# Patient Record
Sex: FEMALE | Race: WHITE | NOT HISPANIC OR LATINO | Employment: OTHER
[De-identification: names, ages, dates, MRNs, and addresses within clinical notes are randomized per-mention and may not be internally consistent; named-entity substitution may affect disease eponyms.]

---

## 2017-02-01 ENCOUNTER — RECORDS - HEALTHEAST (OUTPATIENT)
Dept: ADMINISTRATIVE | Facility: OTHER | Age: 77
End: 2017-02-01

## 2017-02-16 ENCOUNTER — COMMUNICATION - HEALTHEAST (OUTPATIENT)
Dept: FAMILY MEDICINE | Facility: CLINIC | Age: 77
End: 2017-02-16

## 2017-02-21 ENCOUNTER — OFFICE VISIT - HEALTHEAST (OUTPATIENT)
Dept: FAMILY MEDICINE | Facility: CLINIC | Age: 77
End: 2017-02-21

## 2017-02-21 DIAGNOSIS — N64.4 BREAST PAIN, RIGHT: ICD-10-CM

## 2017-02-21 DIAGNOSIS — H05.111 PSEUDOTUMOR (INFLAMMATORY) OF ORBIT, RIGHT: ICD-10-CM

## 2017-02-21 ASSESSMENT — MIFFLIN-ST. JEOR: SCORE: 1140.27

## 2017-03-13 ENCOUNTER — HOSPITAL ENCOUNTER (OUTPATIENT)
Dept: MAMMOGRAPHY | Facility: HOSPITAL | Age: 77
Discharge: HOME | End: 2017-03-13
Attending: FAMILY MEDICINE

## 2017-03-13 DIAGNOSIS — N64.4 BREAST PAIN, RIGHT: ICD-10-CM

## 2017-04-24 ENCOUNTER — COMMUNICATION - HEALTHEAST (OUTPATIENT)
Dept: FAMILY MEDICINE | Facility: CLINIC | Age: 77
End: 2017-04-24

## 2017-04-24 DIAGNOSIS — I10 UNSPECIFIED ESSENTIAL HYPERTENSION: ICD-10-CM

## 2017-04-26 ENCOUNTER — COMMUNICATION - HEALTHEAST (OUTPATIENT)
Dept: FAMILY MEDICINE | Facility: CLINIC | Age: 77
End: 2017-04-26

## 2017-07-27 ENCOUNTER — COMMUNICATION - HEALTHEAST (OUTPATIENT)
Dept: FAMILY MEDICINE | Facility: CLINIC | Age: 77
End: 2017-07-27

## 2017-07-27 DIAGNOSIS — G25.81 RESTLESS LEG SYNDROME: ICD-10-CM

## 2017-08-09 ENCOUNTER — RECORDS - HEALTHEAST (OUTPATIENT)
Dept: GENERAL RADIOLOGY | Facility: CLINIC | Age: 77
End: 2017-08-09

## 2017-08-09 ENCOUNTER — OFFICE VISIT - HEALTHEAST (OUTPATIENT)
Dept: FAMILY MEDICINE | Facility: CLINIC | Age: 77
End: 2017-08-09

## 2017-08-09 DIAGNOSIS — M48.00 SPINAL STENOSIS, UNSPECIFIED REGION OTHER THAN CERVICAL: ICD-10-CM

## 2017-08-09 DIAGNOSIS — I10 ESSENTIAL HYPERTENSION WITH GOAL BLOOD PRESSURE LESS THAN 140/90: ICD-10-CM

## 2017-08-09 DIAGNOSIS — M25.551 PAIN IN RIGHT HIP: ICD-10-CM

## 2017-08-09 DIAGNOSIS — G60.9 HEREDITARY AND IDIOPATHIC PERIPHERAL NEUROPATHY: ICD-10-CM

## 2017-08-09 DIAGNOSIS — M25.551 PELVIC JOINT PAIN, RIGHT: ICD-10-CM

## 2017-08-09 DIAGNOSIS — Z23 NEED FOR TETANUS BOOSTER: ICD-10-CM

## 2017-08-09 DIAGNOSIS — Z00.00 ROUTINE GENERAL MEDICAL EXAMINATION AT A HEALTH CARE FACILITY: ICD-10-CM

## 2017-08-09 DIAGNOSIS — E83.40 DISORDER OF MAGNESIUM METABOLISM: ICD-10-CM

## 2017-08-09 LAB
CHOLEST SERPL-MCNC: 188 MG/DL
FASTING STATUS PATIENT QL REPORTED: YES
HDLC SERPL-MCNC: 46 MG/DL
LDLC SERPL CALC-MCNC: 110 MG/DL
TRIGL SERPL-MCNC: 158 MG/DL

## 2017-08-09 ASSESSMENT — MIFFLIN-ST. JEOR: SCORE: 1151.04

## 2017-09-05 ENCOUNTER — RECORDS - HEALTHEAST (OUTPATIENT)
Dept: ADMINISTRATIVE | Facility: OTHER | Age: 77
End: 2017-09-05

## 2017-10-05 ENCOUNTER — RECORDS - HEALTHEAST (OUTPATIENT)
Dept: LAB | Facility: HOSPITAL | Age: 77
End: 2017-10-05

## 2017-10-05 ENCOUNTER — RECORDS - HEALTHEAST (OUTPATIENT)
Dept: ADMINISTRATIVE | Facility: OTHER | Age: 77
End: 2017-10-05

## 2017-10-09 LAB
ALBUMIN PERCENT: 59.4 % (ref 51–67)
ALBUMIN SERPL ELPH-MCNC: 4.5 G/DL (ref 3.2–4.7)
ALPHA 1 PERCENT: 2.1 % (ref 2–4)
ALPHA 2 PERCENT: 11.8 % (ref 5–13)
ALPHA1 GLOB SERPL ELPH-MCNC: 0.2 G/DL (ref 0.1–0.3)
ALPHA2 GLOB SERPL ELPH-MCNC: 0.9 G/DL (ref 0.4–0.9)
ANA SER QL: 0.5 U
B-GLOBULIN SERPL ELPH-MCNC: 1 G/DL (ref 0.7–1.2)
BETA PERCENT: 13.1 % (ref 10–17)
GAMMA GLOB SERPL ELPH-MCNC: 1 G/DL (ref 0.6–1.4)
GAMMA GLOBULIN PERCENT: 13.6 % (ref 9–20)
PATH ICD:: NORMAL
PROT PATTERN SERPL ELPH-IMP: NORMAL
PROT SERPL-MCNC: 7.6 G/DL (ref 6–8)
REVIEWING PATHOLOGIST: NORMAL

## 2017-10-10 LAB — SS-A/RO AUTOANTIBODIES - HISTORICAL: 1 EU

## 2017-10-17 ENCOUNTER — COMMUNICATION - HEALTHEAST (OUTPATIENT)
Dept: FAMILY MEDICINE | Facility: CLINIC | Age: 77
End: 2017-10-17

## 2017-10-17 LAB — SS-B/LA AUTOANTIBODIES - HISTORICAL: 1 EU

## 2017-11-02 ENCOUNTER — COMMUNICATION - HEALTHEAST (OUTPATIENT)
Dept: SCHEDULING | Facility: CLINIC | Age: 77
End: 2017-11-02

## 2017-11-03 ENCOUNTER — AMBULATORY - HEALTHEAST (OUTPATIENT)
Dept: ADMINISTRATIVE | Facility: CLINIC | Age: 77
End: 2017-11-03

## 2017-11-03 ENCOUNTER — OFFICE VISIT - HEALTHEAST (OUTPATIENT)
Dept: FAMILY MEDICINE | Facility: CLINIC | Age: 77
End: 2017-11-03

## 2017-11-03 DIAGNOSIS — G89.29 RIGHT FLANK PAIN, CHRONIC: ICD-10-CM

## 2017-11-03 DIAGNOSIS — R10.9 RIGHT FLANK PAIN, CHRONIC: ICD-10-CM

## 2017-11-03 ASSESSMENT — MIFFLIN-ST. JEOR: SCORE: 1137.67

## 2018-02-21 ENCOUNTER — COMMUNICATION - HEALTHEAST (OUTPATIENT)
Dept: FAMILY MEDICINE | Facility: CLINIC | Age: 78
End: 2018-02-21

## 2018-02-21 DIAGNOSIS — G60.9 HEREDITARY AND IDIOPATHIC PERIPHERAL NEUROPATHY: ICD-10-CM

## 2018-04-23 ENCOUNTER — COMMUNICATION - HEALTHEAST (OUTPATIENT)
Dept: FAMILY MEDICINE | Facility: CLINIC | Age: 78
End: 2018-04-23

## 2018-04-23 DIAGNOSIS — I10 ESSENTIAL HYPERTENSION: ICD-10-CM

## 2018-04-25 ENCOUNTER — COMMUNICATION - HEALTHEAST (OUTPATIENT)
Dept: FAMILY MEDICINE | Facility: CLINIC | Age: 78
End: 2018-04-25

## 2018-04-25 DIAGNOSIS — G25.81 RESTLESS LEG SYNDROME: ICD-10-CM

## 2018-07-25 ENCOUNTER — OFFICE VISIT - HEALTHEAST (OUTPATIENT)
Dept: FAMILY MEDICINE | Facility: CLINIC | Age: 78
End: 2018-07-25

## 2018-07-25 DIAGNOSIS — M20.41 HAMMERTOE OF SECOND TOE OF RIGHT FOOT: ICD-10-CM

## 2018-07-25 DIAGNOSIS — G60.9 HEREDITARY AND IDIOPATHIC PERIPHERAL NEUROPATHY: ICD-10-CM

## 2018-07-25 DIAGNOSIS — G25.81 RESTLESS LEGS SYNDROME (RLS): ICD-10-CM

## 2018-08-06 ENCOUNTER — RECORDS - HEALTHEAST (OUTPATIENT)
Dept: GENERAL RADIOLOGY | Facility: CLINIC | Age: 78
End: 2018-08-06

## 2018-08-06 ENCOUNTER — OFFICE VISIT - HEALTHEAST (OUTPATIENT)
Dept: FAMILY MEDICINE | Facility: CLINIC | Age: 78
End: 2018-08-06

## 2018-08-06 DIAGNOSIS — G60.9 HEREDITARY AND IDIOPATHIC PERIPHERAL NEUROPATHY: ICD-10-CM

## 2018-08-06 DIAGNOSIS — G25.81 RESTLESS LEGS SYNDROME (RLS): ICD-10-CM

## 2018-08-06 DIAGNOSIS — Z01.818 PREOP EXAMINATION: ICD-10-CM

## 2018-08-06 DIAGNOSIS — Z01.818 ENCOUNTER FOR OTHER PREPROCEDURAL EXAMINATION: ICD-10-CM

## 2018-08-06 DIAGNOSIS — M20.41 HAMMERTOE OF SECOND TOE OF RIGHT FOOT: ICD-10-CM

## 2018-08-06 ASSESSMENT — MIFFLIN-ST. JEOR: SCORE: 1123.61

## 2018-08-07 ENCOUNTER — COMMUNICATION - HEALTHEAST (OUTPATIENT)
Dept: FAMILY MEDICINE | Facility: CLINIC | Age: 78
End: 2018-08-07

## 2018-08-07 ENCOUNTER — ANESTHESIA - HEALTHEAST (OUTPATIENT)
Dept: SURGERY | Facility: AMBULATORY SURGERY CENTER | Age: 78
End: 2018-08-07

## 2018-08-07 ENCOUNTER — AMBULATORY - HEALTHEAST (OUTPATIENT)
Dept: CARDIOLOGY | Facility: CLINIC | Age: 78
End: 2018-08-07

## 2018-08-08 ENCOUNTER — SURGERY - HEALTHEAST (OUTPATIENT)
Dept: SURGERY | Facility: AMBULATORY SURGERY CENTER | Age: 78
End: 2018-08-08

## 2018-08-08 ASSESSMENT — MIFFLIN-ST. JEOR: SCORE: 1119.53

## 2018-08-28 ENCOUNTER — COMMUNICATION - HEALTHEAST (OUTPATIENT)
Dept: FAMILY MEDICINE | Facility: CLINIC | Age: 78
End: 2018-08-28

## 2018-08-28 DIAGNOSIS — G60.9 HEREDITARY AND IDIOPATHIC PERIPHERAL NEUROPATHY: ICD-10-CM

## 2018-09-10 ENCOUNTER — COMMUNICATION - HEALTHEAST (OUTPATIENT)
Dept: FAMILY MEDICINE | Facility: CLINIC | Age: 78
End: 2018-09-10

## 2018-09-10 DIAGNOSIS — I10 ESSENTIAL HYPERTENSION: ICD-10-CM

## 2018-10-14 ENCOUNTER — COMMUNICATION - HEALTHEAST (OUTPATIENT)
Dept: FAMILY MEDICINE | Facility: CLINIC | Age: 78
End: 2018-10-14

## 2018-10-14 DIAGNOSIS — G25.81 RESTLESS LEG SYNDROME: ICD-10-CM

## 2019-02-14 ENCOUNTER — COMMUNICATION - HEALTHEAST (OUTPATIENT)
Dept: FAMILY MEDICINE | Facility: CLINIC | Age: 79
End: 2019-02-14

## 2019-02-14 DIAGNOSIS — I10 ESSENTIAL HYPERTENSION: ICD-10-CM

## 2019-04-02 ENCOUNTER — OFFICE VISIT - HEALTHEAST (OUTPATIENT)
Dept: FAMILY MEDICINE | Facility: CLINIC | Age: 79
End: 2019-04-02

## 2019-04-02 DIAGNOSIS — H91.93 BILATERAL HEARING LOSS, UNSPECIFIED HEARING LOSS TYPE: ICD-10-CM

## 2019-04-02 DIAGNOSIS — H93.13 TINNITUS, BILATERAL: ICD-10-CM

## 2019-04-02 DIAGNOSIS — M26.609 TMJ (TEMPOROMANDIBULAR JOINT SYNDROME): ICD-10-CM

## 2019-04-02 DIAGNOSIS — H61.23 BILATERAL IMPACTED CERUMEN: ICD-10-CM

## 2019-04-03 ENCOUNTER — COMMUNICATION - HEALTHEAST (OUTPATIENT)
Dept: FAMILY MEDICINE | Facility: CLINIC | Age: 79
End: 2019-04-03

## 2019-04-09 ENCOUNTER — COMMUNICATION - HEALTHEAST (OUTPATIENT)
Dept: FAMILY MEDICINE | Facility: CLINIC | Age: 79
End: 2019-04-09

## 2019-04-09 DIAGNOSIS — I10 ESSENTIAL HYPERTENSION: ICD-10-CM

## 2019-06-13 ENCOUNTER — COMMUNICATION - HEALTHEAST (OUTPATIENT)
Dept: FAMILY MEDICINE | Facility: CLINIC | Age: 79
End: 2019-06-13

## 2019-06-13 DIAGNOSIS — G25.81 RESTLESS LEG SYNDROME: ICD-10-CM

## 2019-07-23 ENCOUNTER — COMMUNICATION - HEALTHEAST (OUTPATIENT)
Dept: FAMILY MEDICINE | Facility: CLINIC | Age: 79
End: 2019-07-23

## 2019-07-23 DIAGNOSIS — G60.9 HEREDITARY AND IDIOPATHIC PERIPHERAL NEUROPATHY: ICD-10-CM

## 2019-09-04 ENCOUNTER — OFFICE VISIT - HEALTHEAST (OUTPATIENT)
Dept: FAMILY MEDICINE | Facility: CLINIC | Age: 79
End: 2019-09-04

## 2019-09-04 DIAGNOSIS — G60.9 HEREDITARY AND IDIOPATHIC PERIPHERAL NEUROPATHY: ICD-10-CM

## 2019-09-04 DIAGNOSIS — I10 ESSENTIAL HYPERTENSION: ICD-10-CM

## 2019-09-04 DIAGNOSIS — R42 DIZZINESS AND GIDDINESS: ICD-10-CM

## 2019-09-04 DIAGNOSIS — G25.81 RESTLESS LEGS SYNDROME (RLS): ICD-10-CM

## 2019-09-04 DIAGNOSIS — Z00.00 ENCOUNTER FOR MEDICARE ANNUAL WELLNESS EXAM: ICD-10-CM

## 2019-09-04 DIAGNOSIS — E83.40 DISORDER OF MAGNESIUM METABOLISM: ICD-10-CM

## 2019-09-04 LAB
ALBUMIN SERPL-MCNC: 4.1 G/DL (ref 3.5–5)
ALP SERPL-CCNC: 71 U/L (ref 45–120)
ALT SERPL W P-5'-P-CCNC: 22 U/L (ref 0–45)
ANION GAP SERPL CALCULATED.3IONS-SCNC: 13 MMOL/L (ref 5–18)
AST SERPL W P-5'-P-CCNC: 27 U/L (ref 0–40)
BASOPHILS # BLD AUTO: 0 THOU/UL (ref 0–0.2)
BASOPHILS NFR BLD AUTO: 1 % (ref 0–2)
BILIRUB SERPL-MCNC: 0.4 MG/DL (ref 0–1)
BUN SERPL-MCNC: 15 MG/DL (ref 8–28)
CALCIUM SERPL-MCNC: 10.4 MG/DL (ref 8.5–10.5)
CHLORIDE BLD-SCNC: 100 MMOL/L (ref 98–107)
CHOLEST SERPL-MCNC: 191 MG/DL
CO2 SERPL-SCNC: 27 MMOL/L (ref 22–31)
CREAT SERPL-MCNC: 0.9 MG/DL (ref 0.6–1.1)
EOSINOPHIL # BLD AUTO: 0.2 THOU/UL (ref 0–0.4)
EOSINOPHIL NFR BLD AUTO: 4 % (ref 0–6)
ERYTHROCYTE [DISTWIDTH] IN BLOOD BY AUTOMATED COUNT: 11.3 % (ref 11–14.5)
FASTING STATUS PATIENT QL REPORTED: YES
GFR SERPL CREATININE-BSD FRML MDRD: >60 ML/MIN/1.73M2
GLUCOSE BLD-MCNC: 109 MG/DL (ref 70–125)
HCT VFR BLD AUTO: 43 % (ref 35–47)
HDLC SERPL-MCNC: 54 MG/DL
HGB BLD-MCNC: 14.9 G/DL (ref 12–16)
LDLC SERPL CALC-MCNC: 103 MG/DL
LYMPHOCYTES # BLD AUTO: 1.8 THOU/UL (ref 0.8–4.4)
LYMPHOCYTES NFR BLD AUTO: 31 % (ref 20–40)
MAGNESIUM SERPL-MCNC: 2 MG/DL (ref 1.8–2.6)
MCH RBC QN AUTO: 32.9 PG (ref 27–34)
MCHC RBC AUTO-ENTMCNC: 34.6 G/DL (ref 32–36)
MCV RBC AUTO: 95 FL (ref 80–100)
MONOCYTES # BLD AUTO: 0.6 THOU/UL (ref 0–0.9)
MONOCYTES NFR BLD AUTO: 10 % (ref 2–10)
NEUTROPHILS # BLD AUTO: 3.2 THOU/UL (ref 2–7.7)
NEUTROPHILS NFR BLD AUTO: 54 % (ref 50–70)
PLATELET # BLD AUTO: 294 THOU/UL (ref 140–440)
PMV BLD AUTO: 7.3 FL (ref 7–10)
POTASSIUM BLD-SCNC: 4.1 MMOL/L (ref 3.5–5)
PROT SERPL-MCNC: 7.8 G/DL (ref 6–8)
RBC # BLD AUTO: 4.51 MILL/UL (ref 3.8–5.4)
SODIUM SERPL-SCNC: 140 MMOL/L (ref 136–145)
TRIGL SERPL-MCNC: 169 MG/DL
TSH SERPL DL<=0.005 MIU/L-ACNC: 3.13 UIU/ML (ref 0.3–5)
VIT B12 SERPL-MCNC: 534 PG/ML (ref 213–816)
WBC: 5.8 THOU/UL (ref 4–11)

## 2019-09-04 ASSESSMENT — MIFFLIN-ST. JEOR: SCORE: 1111.81

## 2019-11-01 ENCOUNTER — AMBULATORY - HEALTHEAST (OUTPATIENT)
Dept: NURSING | Facility: CLINIC | Age: 79
End: 2019-11-01

## 2019-11-01 DIAGNOSIS — Z23 FLU VACCINE NEED: ICD-10-CM

## 2020-01-10 ENCOUNTER — COMMUNICATION - HEALTHEAST (OUTPATIENT)
Dept: FAMILY MEDICINE | Facility: CLINIC | Age: 80
End: 2020-01-10

## 2020-01-10 DIAGNOSIS — I10 ESSENTIAL HYPERTENSION: ICD-10-CM

## 2020-04-21 ENCOUNTER — RECORDS - HEALTHEAST (OUTPATIENT)
Dept: ADMINISTRATIVE | Facility: OTHER | Age: 80
End: 2020-04-21

## 2020-05-04 ENCOUNTER — COMMUNICATION - HEALTHEAST (OUTPATIENT)
Dept: SCHEDULING | Facility: CLINIC | Age: 80
End: 2020-05-04

## 2020-05-05 ENCOUNTER — OFFICE VISIT - HEALTHEAST (OUTPATIENT)
Dept: FAMILY MEDICINE | Facility: CLINIC | Age: 80
End: 2020-05-05

## 2020-05-05 DIAGNOSIS — H92.01 OTALGIA, RIGHT: ICD-10-CM

## 2020-05-05 DIAGNOSIS — R42 DIZZINESS: ICD-10-CM

## 2020-05-15 ENCOUNTER — COMMUNICATION - HEALTHEAST (OUTPATIENT)
Dept: FAMILY MEDICINE | Facility: CLINIC | Age: 80
End: 2020-05-15

## 2020-05-15 DIAGNOSIS — G60.9 HEREDITARY AND IDIOPATHIC PERIPHERAL NEUROPATHY: ICD-10-CM

## 2020-06-29 ENCOUNTER — COMMUNICATION - HEALTHEAST (OUTPATIENT)
Dept: FAMILY MEDICINE | Facility: CLINIC | Age: 80
End: 2020-06-29

## 2020-06-29 DIAGNOSIS — G25.81 RESTLESS LEG SYNDROME: ICD-10-CM

## 2020-08-29 ENCOUNTER — COMMUNICATION - HEALTHEAST (OUTPATIENT)
Dept: FAMILY MEDICINE | Facility: CLINIC | Age: 80
End: 2020-08-29

## 2020-09-08 ENCOUNTER — OFFICE VISIT - HEALTHEAST (OUTPATIENT)
Dept: FAMILY MEDICINE | Facility: CLINIC | Age: 80
End: 2020-09-08

## 2020-09-08 DIAGNOSIS — Z23 NEED FOR SHINGLES VACCINE: ICD-10-CM

## 2020-09-08 DIAGNOSIS — E83.40 DISORDER OF MAGNESIUM METABOLISM: ICD-10-CM

## 2020-09-08 DIAGNOSIS — Z78.0 ASYMPTOMATIC MENOPAUSE: ICD-10-CM

## 2020-09-08 DIAGNOSIS — G60.9 HEREDITARY AND IDIOPATHIC PERIPHERAL NEUROPATHY: ICD-10-CM

## 2020-09-08 DIAGNOSIS — I10 ESSENTIAL HYPERTENSION: ICD-10-CM

## 2020-09-08 DIAGNOSIS — M21.611 BUNION, RIGHT: ICD-10-CM

## 2020-09-08 DIAGNOSIS — Z23 NEED FOR IMMUNIZATION AGAINST INFLUENZA: ICD-10-CM

## 2020-09-08 DIAGNOSIS — Z00.00 ENCOUNTER FOR MEDICARE ANNUAL WELLNESS EXAM: ICD-10-CM

## 2020-09-08 DIAGNOSIS — H93.13 TINNITUS, BILATERAL: ICD-10-CM

## 2020-09-08 LAB
ALBUMIN SERPL-MCNC: 3.7 G/DL (ref 3.5–5)
ALP SERPL-CCNC: 68 U/L (ref 45–120)
ALT SERPL W P-5'-P-CCNC: 21 U/L (ref 0–45)
ANION GAP SERPL CALCULATED.3IONS-SCNC: 10 MMOL/L (ref 5–18)
AST SERPL W P-5'-P-CCNC: 24 U/L (ref 0–40)
BASOPHILS # BLD AUTO: 0.1 THOU/UL (ref 0–0.2)
BASOPHILS NFR BLD AUTO: 1 % (ref 0–2)
BILIRUB SERPL-MCNC: 0.3 MG/DL (ref 0–1)
BUN SERPL-MCNC: 9 MG/DL (ref 8–28)
CALCIUM SERPL-MCNC: 9.2 MG/DL (ref 8.5–10.5)
CHLORIDE BLD-SCNC: 101 MMOL/L (ref 98–107)
CHOLEST SERPL-MCNC: 166 MG/DL
CO2 SERPL-SCNC: 29 MMOL/L (ref 22–31)
CREAT SERPL-MCNC: 0.8 MG/DL (ref 0.6–1.1)
EOSINOPHIL # BLD AUTO: 0.2 THOU/UL (ref 0–0.4)
EOSINOPHIL NFR BLD AUTO: 3 % (ref 0–6)
ERYTHROCYTE [DISTWIDTH] IN BLOOD BY AUTOMATED COUNT: 11.3 % (ref 11–14.5)
FASTING STATUS PATIENT QL REPORTED: YES
GFR SERPL CREATININE-BSD FRML MDRD: >60 ML/MIN/1.73M2
GLUCOSE BLD-MCNC: 103 MG/DL (ref 70–125)
HCT VFR BLD AUTO: 42.4 % (ref 35–47)
HDLC SERPL-MCNC: 48 MG/DL
HGB BLD-MCNC: 13.9 G/DL (ref 12–16)
LDLC SERPL CALC-MCNC: 83 MG/DL
LYMPHOCYTES # BLD AUTO: 2.2 THOU/UL (ref 0.8–4.4)
LYMPHOCYTES NFR BLD AUTO: 33 % (ref 20–40)
MAGNESIUM SERPL-MCNC: 2.1 MG/DL (ref 1.8–2.6)
MCH RBC QN AUTO: 31.3 PG (ref 27–34)
MCHC RBC AUTO-ENTMCNC: 32.8 G/DL (ref 32–36)
MCV RBC AUTO: 95 FL (ref 80–100)
MONOCYTES # BLD AUTO: 0.6 THOU/UL (ref 0–0.9)
MONOCYTES NFR BLD AUTO: 8 % (ref 2–10)
NEUTROPHILS # BLD AUTO: 3.7 THOU/UL (ref 2–7.7)
NEUTROPHILS NFR BLD AUTO: 55 % (ref 50–70)
PLATELET # BLD AUTO: 351 THOU/UL (ref 140–440)
PMV BLD AUTO: 6.9 FL (ref 7–10)
POTASSIUM BLD-SCNC: 3.9 MMOL/L (ref 3.5–5)
PROT SERPL-MCNC: 7.5 G/DL (ref 6–8)
RBC # BLD AUTO: 4.45 MILL/UL (ref 3.8–5.4)
SODIUM SERPL-SCNC: 140 MMOL/L (ref 136–145)
TRIGL SERPL-MCNC: 173 MG/DL
WBC: 6.7 THOU/UL (ref 4–11)

## 2020-09-08 ASSESSMENT — MIFFLIN-ST. JEOR: SCORE: 1119.53

## 2020-09-10 ENCOUNTER — AMBULATORY - HEALTHEAST (OUTPATIENT)
Dept: SCHEDULING | Facility: CLINIC | Age: 80
End: 2020-09-10

## 2020-09-10 DIAGNOSIS — Z78.0 ASYMPTOMATIC MENOPAUSE: ICD-10-CM

## 2020-09-16 ENCOUNTER — COMMUNICATION - HEALTHEAST (OUTPATIENT)
Dept: FAMILY MEDICINE | Facility: CLINIC | Age: 80
End: 2020-09-16

## 2020-09-16 DIAGNOSIS — I10 ESSENTIAL HYPERTENSION: ICD-10-CM

## 2020-10-11 ENCOUNTER — RECORDS - HEALTHEAST (OUTPATIENT)
Dept: ADMINISTRATIVE | Facility: OTHER | Age: 80
End: 2020-10-11

## 2020-11-09 ENCOUNTER — OFFICE VISIT - HEALTHEAST (OUTPATIENT)
Dept: FAMILY MEDICINE | Facility: CLINIC | Age: 80
End: 2020-11-09

## 2020-11-09 DIAGNOSIS — I25.83 CORONARY ATHEROSCLEROSIS DUE TO LIPID RICH PLAQUE: ICD-10-CM

## 2020-11-09 DIAGNOSIS — H93.13 TINNITUS, BILATERAL: ICD-10-CM

## 2020-11-09 DIAGNOSIS — G60.9 HEREDITARY AND IDIOPATHIC PERIPHERAL NEUROPATHY: ICD-10-CM

## 2020-11-09 DIAGNOSIS — Z01.818 PREOP EXAMINATION: ICD-10-CM

## 2020-11-09 DIAGNOSIS — M21.611 BUNION, RIGHT: ICD-10-CM

## 2020-11-09 DIAGNOSIS — I10 ESSENTIAL HYPERTENSION: ICD-10-CM

## 2020-11-09 LAB
ATRIAL RATE - MUSE: 90 BPM
DIASTOLIC BLOOD PRESSURE - MUSE: NORMAL
HGB BLD-MCNC: 13.2 G/DL (ref 12–16)
INTERPRETATION ECG - MUSE: NORMAL
P AXIS - MUSE: 78 DEGREES
POTASSIUM BLD-SCNC: 3.9 MMOL/L (ref 3.5–5)
PR INTERVAL - MUSE: 216 MS
QRS DURATION - MUSE: 82 MS
QT - MUSE: 378 MS
QTC - MUSE: 462 MS
R AXIS - MUSE: 2 DEGREES
SYSTOLIC BLOOD PRESSURE - MUSE: NORMAL
T AXIS - MUSE: 71 DEGREES
VENTRICULAR RATE- MUSE: 90 BPM

## 2020-11-09 ASSESSMENT — MIFFLIN-ST. JEOR: SCORE: 1114.99

## 2020-12-15 ENCOUNTER — COMMUNICATION - HEALTHEAST (OUTPATIENT)
Dept: FAMILY MEDICINE | Facility: CLINIC | Age: 80
End: 2020-12-15

## 2020-12-15 DIAGNOSIS — G60.9 HEREDITARY AND IDIOPATHIC PERIPHERAL NEUROPATHY: ICD-10-CM

## 2021-02-01 ENCOUNTER — COMMUNICATION - HEALTHEAST (OUTPATIENT)
Dept: FAMILY MEDICINE | Facility: CLINIC | Age: 81
End: 2021-02-01

## 2021-02-01 ENCOUNTER — RECORDS - HEALTHEAST (OUTPATIENT)
Dept: ADMINISTRATIVE | Facility: OTHER | Age: 81
End: 2021-02-01

## 2021-02-23 ENCOUNTER — OFFICE VISIT - HEALTHEAST (OUTPATIENT)
Dept: FAMILY MEDICINE | Facility: CLINIC | Age: 81
End: 2021-02-23

## 2021-02-23 DIAGNOSIS — M75.41 IMPINGEMENT SYNDROME OF SHOULDER REGION, RIGHT: ICD-10-CM

## 2021-02-26 ENCOUNTER — COMMUNICATION - HEALTHEAST (OUTPATIENT)
Dept: FAMILY MEDICINE | Facility: CLINIC | Age: 81
End: 2021-02-26

## 2021-02-26 DIAGNOSIS — G25.81 RESTLESS LEG SYNDROME: ICD-10-CM

## 2021-02-26 DIAGNOSIS — G60.9 HEREDITARY AND IDIOPATHIC PERIPHERAL NEUROPATHY: ICD-10-CM

## 2021-02-26 DIAGNOSIS — I10 ESSENTIAL HYPERTENSION: ICD-10-CM

## 2021-03-02 ENCOUNTER — AMBULATORY - HEALTHEAST (OUTPATIENT)
Dept: NURSING | Facility: CLINIC | Age: 81
End: 2021-03-02

## 2021-03-23 ENCOUNTER — AMBULATORY - HEALTHEAST (OUTPATIENT)
Dept: NURSING | Facility: CLINIC | Age: 81
End: 2021-03-23

## 2021-03-24 ENCOUNTER — COMMUNICATION - HEALTHEAST (OUTPATIENT)
Dept: FAMILY MEDICINE | Facility: CLINIC | Age: 81
End: 2021-03-24

## 2021-03-24 DIAGNOSIS — G25.81 RESTLESS LEG SYNDROME: ICD-10-CM

## 2021-03-24 DIAGNOSIS — G60.9 HEREDITARY AND IDIOPATHIC PERIPHERAL NEUROPATHY: ICD-10-CM

## 2021-03-24 DIAGNOSIS — I10 ESSENTIAL HYPERTENSION: ICD-10-CM

## 2021-03-24 DIAGNOSIS — M81.0 AGE-RELATED OSTEOPOROSIS WITHOUT CURRENT PATHOLOGICAL FRACTURE: ICD-10-CM

## 2021-03-26 RX ORDER — NORTRIPTYLINE HYDROCHLORIDE 75 MG/1
75 CAPSULE ORAL AT BEDTIME
Qty: 90 CAPSULE | Refills: 2 | Status: SHIPPED | OUTPATIENT
Start: 2021-03-26 | End: 2021-12-25

## 2021-03-26 RX ORDER — HYDROCHLOROTHIAZIDE 25 MG/1
25 TABLET ORAL DAILY
Qty: 90 TABLET | Refills: 2 | Status: SHIPPED | OUTPATIENT
Start: 2021-03-26 | End: 2021-12-25

## 2021-03-26 RX ORDER — GABAPENTIN 300 MG/1
CAPSULE ORAL
Qty: 540 CAPSULE | Refills: 3 | Status: SHIPPED | OUTPATIENT
Start: 2021-03-26 | End: 2022-04-01

## 2021-05-11 ENCOUNTER — OFFICE VISIT - HEALTHEAST (OUTPATIENT)
Dept: FAMILY MEDICINE | Facility: CLINIC | Age: 81
End: 2021-05-11

## 2021-05-11 DIAGNOSIS — Z82.49 FAMILY HISTORY OF ISCHEMIC HEART DISEASE: ICD-10-CM

## 2021-05-25 ENCOUNTER — RECORDS - HEALTHEAST (OUTPATIENT)
Dept: ADMINISTRATIVE | Facility: CLINIC | Age: 81
End: 2021-05-25

## 2021-05-26 ENCOUNTER — RECORDS - HEALTHEAST (OUTPATIENT)
Dept: ADMINISTRATIVE | Facility: CLINIC | Age: 81
End: 2021-05-26

## 2021-05-27 ENCOUNTER — RECORDS - HEALTHEAST (OUTPATIENT)
Dept: ADMINISTRATIVE | Facility: CLINIC | Age: 81
End: 2021-05-27

## 2021-05-27 VITALS
SYSTOLIC BLOOD PRESSURE: 144 MMHG | TEMPERATURE: 98.5 F | HEART RATE: 77 BPM | DIASTOLIC BLOOD PRESSURE: 81 MMHG | WEIGHT: 153 LBS | RESPIRATION RATE: 14 BRPM

## 2021-05-27 NOTE — TELEPHONE ENCOUNTER
RN cannot approve Refill Request    RN can NOT refill this medication overdue for office visits and/or labs.    Alex Garcia, [facility] Triage/Med Refill 4/10/2019    Requested Prescriptions   Pending Prescriptions Disp Refills     hydroCHLOROthiazide (HYDRODIURIL) 25 MG tablet [Pharmacy Med Name: HYDROCHLOROTHIAZIDE 25 MG Tablet] 90 tablet 1     Sig: TAKE 1 TABLET EVERY DAY       Diuretics/Combination Diuretics Refill Protocol  Failed - 4/9/2019  8:18 PM        Failed - Serum Potassium in past 12 months      No results found for: LN-POTASSIUM          Failed - Serum Sodium in past 12 months      No results found for: LN-SODIUM          Failed - Serum Creatinine in past 12 months      Creatinine   Date Value Ref Range Status   08/09/2017 0.86 0.60 - 1.10 mg/dL Final             Passed - Visit with PCP or prescribing provider visit in past 12 months     Last office visit with prescriber/PCP: 4/2/2019 Emmanuelle Woodall MD OR same dept: 4/2/2019 Emmanuelle Woodall MD OR same specialty: 4/2/2019 Emmanuelle Woodall MD  Last physical: 8/6/2018 Last MTM visit: Visit date not found   Next visit within 3 mo: Visit date not found  Next physical within 3 mo: Visit date not found  Prescriber OR PCP: Emmanuelle Woodall MD  Last diagnosis associated with med order: 1. Essential hypertension  - hydroCHLOROthiazide (HYDRODIURIL) 25 MG tablet [Pharmacy Med Name: HYDROCHLOROTHIAZIDE 25 MG Tablet]; TAKE 1 TABLET EVERY DAY  Dispense: 90 tablet; Refill: 1    If protocol passes may refill for 12 months if within 3 months of last provider visit (or a total of 15 months).             Passed - Blood pressure on file in past 12 months     BP Readings from Last 1 Encounters:   04/02/19 130/70

## 2021-05-27 NOTE — PROGRESS NOTES
ASSESSMENT/PLAN:  1. TMJ (temporomandibular joint syndrome)     2. Bilateral impacted cerumen  Ear cerumen removal   3. Bilateral hearing loss, unspecified hearing loss type     4. Tinnitus, bilateral         This is a 78 yo female with:  1.  TMJ syndrome - she has clicking in left TMJ and movement of that joint on exam.  She does chew a lot of gum due to her sicca syndrome/chronic dry mouth.  We discussed this - I would encourage her to talk to her local dentist - or I'd be happy to arrange for a visit to a TMJ specialist.  She prefers to start with her dentist.    2.  Bilateral impacted cerumen - she is being evaluated for hearing aids - has deep impacted cerumen bilaterally - unable to reach with ear curette; will do ear lavage  3.  Bilateral hearing loss - as above - patient notes that she is having more and more difficulty with hearing - has ordered hearing aids - will start with one at a time.   4.  Tinnitus - discussed this is very difficult to treat  Return in about 3 months (around 7/2/2019).      There are no discontinued medications.  There are no Patient Instructions on file for this visit.    Chief Complaint:  Chief Complaint   Patient presents with     Neck and Jaw concerns     snapping noise       HPI:   Deborah Mireles is a 79 y.o. female c/o  Had surgery on right foot - last August -    1.  Jaw is out of place - it snaps  Since then, has snaps in neck   2.  Has ordered hearing aids - will try one at a time  When in a group - with wax and tinnitus, is having trouble hearing  3.  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     Gastroesophageal reflux disease without esophagitis 02/18/2016     Skin tag 12/25/2015     Acute bronchitis 09/14/2015      Chronic cough 01/05/2015     Onychomycosis Of The Toenails      Chronic Constipation      Osteoarthritis Of The Knee      Osteoporosis      Atypical Chest Pain      Seborrheic Dermatitis      Seborrheic Dermatitis Of The Scalp      Itching (Pruritus)      Peripheral Neuropathy      Asymptomatic Coronary Arteriosclerosis      Hypermagnesemia      Restless Legs Syndrome      Hypertension      Spinal Stenosis      Epistaxis      Cerumen Impaction      Vertigo      Cough      Patellofemoral Syndrome      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     Restless Legs Syndrome      Seborrheic Dermatitis      Spinal Stenosis      Vertigo      Past Surgical History:   Procedure Laterality Date     ABDOMINAL SURGERY       APPENDECTOMY       BACK SURGERY       CHOLECYSTECTOMY       COSMETIC SURGERY       EYE SURGERY       FRACTURE SURGERY       LUMBAR FUSION  08/2013     [place] DILATION/CURETTAGE,DIAGNOSTIC      Description: Dilation And Curettage;  Recorded: 02/02/2010;  Comments: due to miscarriage     [place] KNEE SCOPE,DIAGNOSTIC      Description: Arthroscopy Knee Right;  Recorded: 02/02/2010;  Comments: right knee - arthroscopy for debridement; then Synvisc     [place] REMOVAL GALLBLADDER      Description: Cholecystectomy;  Recorded: 02/02/2010;  Comments: age 30s     Social History     Socioeconomic History     Marital status:      Spouse name: Not on file     Number of children: Not on file     Years of education: Not on file     Highest education level: Not on file   Occupational History     Not on file   Social Needs     Financial resource strain: Not on file     Food insecurity:      Worry: Not on file     Inability: Not on file     Transportation needs:     Medical: Not on file     Non-medical: Not on file   Tobacco Use     Smoking status: Never Smoker     Smokeless tobacco: Never Used   Substance and Sexual Activity     Alcohol use: No     Drug use: No     Sexual activity: Not on file   Lifestyle     Physical activity:     Days per week: Not on file     Minutes per session: Not on file     Stress: Not on file   Relationships     Social connections:     Talks on phone: Not on file     Gets together: Not on file     Attends Restorationist service: Not on file     Active member of club or organization: Not on file     Attends meetings of clubs or organizations: Not on file     Relationship status: Not on file     Intimate partner violence:     Fear of current or ex partner: Not on file     Emotionally abused: Not on file     Physically abused: Not on file     Forced sexual activity: Not on file   Other Topics Concern     Not on file   Social History Narrative     Not on file     History reviewed. No pertinent family history.    Meds:    Current Outpatient Medications:      aspirin 81 mg chewable tablet, Chew 81 mg daily., Disp: , Rfl:      calcium carbonate-vitamin D3 (CALCIUM 600 + D,3,) 600 mg(1,500mg) -200 unit per tablet, Take 1 tablet by mouth 2 (two) times a day., Disp: , Rfl:      gabapentin (NEURONTIN) 300 MG capsule, TAKE 2 CAPSULES THREE TIMES DAILY, Disp: 540 capsule, Rfl: 2     glucos-msm-C-Mn-herb#21 (GLUCOSAMINE-MSM COMPLEX) Tab, Take 1 tablet by mouth 2 (two) times a day., Disp: , Rfl:      hydroCHLOROthiazide (HYDRODIURIL) 25 MG tablet, Take 1 tablet (25 mg total) by mouth daily., Disp: 90 tablet, Rfl: 0     HYDROcodone-acetaminophen (NORCO) 5-325 mg per tablet, Take 1-2 tablets by mouth every 4 (four) hours as needed for pain., Disp: 40 tablet, Rfl: 0     MULTIVITAMIN (MULTIPLE VITAMIN ORAL), Take 1 tablet by mouth daily., Disp: , Rfl:      nortriptyline (PAMELOR) 75 MG capsule,  Take 1 capsule (75 mg total) by mouth at bedtime., Disp: 90 capsule, Rfl: 3    Allergies:  No Known Allergies    ROS:  Pertinent positives as noted in HPI; otherwise 12 point ROS negative.      Physical Exam:  EXAM:  /70 (Patient Site: Left Arm, Patient Position: Sitting, Cuff Size: Adult Regular)   Pulse 86   Resp 20   Wt 149 lb 7 oz (67.8 kg)   Breastfeeding? No   BMI 26.47 kg/m     Gen:  NAD, appears well, well-hydrated  HEENT:  TMs bilateral cerumen impaction deep in auditory canals, oropharynx benign, nasal mucosa nl, conjunctiva clear; dry mouth; movement/clicking in left TMJ with opening/closing jaw  Neck:  Supple, no adenopathy, no thyromegaly, no carotid bruits, no JVD  Lungs:  Clear to auscultation bilaterally  Cor:  RRR no murmur  Abd:  Soft, nontender, BS+, no masses, no guarding or rebound, no HSM  Extr:  Neg.  Neuro:  No asymmetry  Skin:  Warm/dry        Results:  Results for orders placed or performed in visit on 10/05/17   Electrophoresis, Protein, Serum   Result Value Ref Range    Albumin % 59.4 51.0 - 67.0 %    Albumin  4.5 3.2 - 4.7 g/dL    Alpha 1 % 2.1 2.0 - 4.0 %    Alpha 1 0.2 0.1 - 0.3 g/dL    Alpha 2 % 11.8 5.0 - 13.0 %    Alpha 2 0.9 0.4 - 0.9 g/dL    % Beta 13.1 10.0 - 17.0 %    Beta 1.0 0.7 - 1.2 g/dL    Gamma Globulin % 13.6 9.0 - 20.0 %    Gamma Globulin 1.0 0.6 - 1.4 g/dL    ELP Comment Unremarkable protein electrophoresis.       Protein, Total 7.6 6.0 - 8.0 g/dL    Path ICD: G62.9     Interpreted By: Dejon Garay MD    Antinuclear Antibodies Screen (ALEJANDRO)   Result Value Ref Range    ALEJANDRO Screen Cascade 0.5 <=2.9 U   Rheumatoid Factor Screen   Result Value Ref Range    Rheumatoid Factor Quantitative <15.0 0 - 30 IU/mL   SS-A/RO Auto Antibodies   Result Value Ref Range    SS-A/Ro Autoantibodies 1 <20 EU   SS-B/LA Auto Antibodies   Result Value Ref Range    SS-B/La Autoantibodies 1 <20 EU

## 2021-05-28 ENCOUNTER — RECORDS - HEALTHEAST (OUTPATIENT)
Dept: ADMINISTRATIVE | Facility: CLINIC | Age: 81
End: 2021-05-28

## 2021-05-29 ENCOUNTER — RECORDS - HEALTHEAST (OUTPATIENT)
Dept: ADMINISTRATIVE | Facility: CLINIC | Age: 81
End: 2021-05-29

## 2021-05-29 NOTE — TELEPHONE ENCOUNTER
Refill Approved    Rx renewed per Medication Renewal Policy. Medication was last renewed on 10/15/2018 for 540/3  Last OV 4/2/2019    Kajal Riddle, [facility] Triage/Med Refill 6/14/2019     Requested Prescriptions   Pending Prescriptions Disp Refills     gabapentin (NEURONTIN) 300 MG capsule [Pharmacy Med Name: GABAPENTIN 300 MG Capsule] 540 capsule 2     Sig: TAKE 2 CAPSULES THREE TIMES DAILY       Gabapentin/Levetiracetam/Tiagabine Refill Protocol  Passed - 6/13/2019 11:35 AM        Passed - PCP or prescribing provider visit in past 12 months or next 3 months     Last office visit with prescriber/PCP: 4/2/2019 Emmanuelle Woodall MD OR same dept: 4/2/2019 Emmanuelle Woodall MD OR same specialty: 4/2/2019 Emmanuelle Woodall MD  Last physical: 8/6/2018 Last MTM visit: Visit date not found   Next visit within 3 mo: Visit date not found  Next physical within 3 mo: Visit date not found  Prescriber OR PCP: Emmanuelle Woodall MD  Last diagnosis associated with med order: 1. Restless leg syndrome  - gabapentin (NEURONTIN) 300 MG capsule [Pharmacy Med Name: GABAPENTIN 300 MG Capsule]; TAKE 2 CAPSULES THREE TIMES DAILY  Dispense: 540 capsule; Refill: 2    If protocol passes may refill for 12 months if within 3 months of last provider visit (or a total of 15 months).

## 2021-05-30 ENCOUNTER — RECORDS - HEALTHEAST (OUTPATIENT)
Dept: ADMINISTRATIVE | Facility: CLINIC | Age: 81
End: 2021-05-30

## 2021-05-30 VITALS — WEIGHT: 153.7 LBS | HEIGHT: 63 IN | BODY MASS INDEX: 27.23 KG/M2

## 2021-05-30 NOTE — TELEPHONE ENCOUNTER
Refill Approved    Rx renewed per Medication Renewal Policy. Medication was last renewed on 8/28/18.    Enedina Jackson, [facility] Triage/Med Refill 7/24/2019     Requested Prescriptions   Pending Prescriptions Disp Refills     nortriptyline (PAMELOR) 75 MG capsule [Pharmacy Med Name: NORTRIPTYLINE HCL 75 MG Capsule] 90 capsule 3     Sig: TAKE 1 CAPSULE AT BEDTIME       Tricyclics/Misc Antidepressant/Antianxiety Meds Refill Protocol Passed - 7/23/2019  3:14 PM        Passed - PCP or prescribing provider visit in last year     Last office visit with prescriber/PCP: 4/2/2019 Emmanuelle Woodall MD OR same dept: 4/2/2019 Emmanuelle Woodall MD OR same specialty: 4/2/2019 Emmanuelle Woodall MD  Last physical: 8/6/2018 Last MTM visit: Visit date not found   Next visit within 3 mo: Visit date not found  Next physical within 3 mo: Visit date not found  Prescriber OR PCP: Emmanuelle Woodall MD  Last diagnosis associated with med order: 1. Hereditary and idiopathic peripheral neuropathy  - nortriptyline (PAMELOR) 75 MG capsule [Pharmacy Med Name: NORTRIPTYLINE HCL 75 MG Capsule]; TAKE 1 CAPSULE AT BEDTIME  Dispense: 90 capsule; Refill: 3    If protocol passes may refill for 12 months if within 3 months of last provider visit (or a total of 15 months).

## 2021-05-31 VITALS — WEIGHT: 154 LBS | HEIGHT: 63 IN | BODY MASS INDEX: 27.29 KG/M2

## 2021-05-31 VITALS — WEIGHT: 155.2 LBS | HEIGHT: 64 IN | BODY MASS INDEX: 26.5 KG/M2

## 2021-06-01 ENCOUNTER — RECORDS - HEALTHEAST (OUTPATIENT)
Dept: ADMINISTRATIVE | Facility: CLINIC | Age: 81
End: 2021-06-01

## 2021-06-01 VITALS — HEIGHT: 63 IN | WEIGHT: 150 LBS | BODY MASS INDEX: 26.58 KG/M2

## 2021-06-01 VITALS — BODY MASS INDEX: 26.75 KG/M2 | WEIGHT: 151 LBS

## 2021-06-01 VITALS — WEIGHT: 150.9 LBS | BODY MASS INDEX: 26.74 KG/M2 | HEIGHT: 63 IN

## 2021-06-01 NOTE — PROGRESS NOTES
Assessment and Plan:     1. Encounter for Medicare annual wellness exam     2. Peripheral Neuropathy  Comprehensive Metabolic Panel    HM1(CBC and Differential)    Thyroid Stimulating Hormone (TSH)    Vitamin B12    HM1 (CBC with Diff)   3. Hypermagnesemia  Magnesium   4. Hypertension  Comprehensive Metabolic Panel    Lipid Profile   5. Vertigo     6. Restless Legs Syndrome       This is a 78 yo female here for AWV/exam  1.  Peripheral neuropathy - stable chronic problem - but, still unclear etiology; check labs  2.  Hypermagnesemia - has history of low magnesium - check levels  3.  Hypertension - blood pressure is stable - continue to monitor labs  4.  Vertigo - generally stable, but recurrent symptoms   5.  Restless Legs Syndrome - chronic - still bothersome and interferes with sleep       The patient's current medical problems were reviewed.    I have had an Advance Directives discussion with the patient.  The following health maintenance schedule was reviewed with the patient and provided in printed form in the after visit summary:   Health Maintenance   Topic Date Due     DXA SCAN  01/16/2006     ZOSTER VACCINES (2 of 3) 06/27/2012     INFLUENZA VACCINE RULE BASED (1) 08/01/2019     MEDICARE ANNUAL WELLNESS VISIT  09/04/2020     FALL RISK ASSESSMENT  09/04/2020     ADVANCE CARE PLANNING  09/04/2024     TD 18+ HE  08/09/2027     PNEUMOCOCCAL POLYSACCHARIDE VACCINE AGE 65 AND OVER  Completed     PNEUMOCOCCAL CONJUGATE VACCINE FOR ADULTS (PCV13 OR PREVNAR)  Completed        Subjective:   Chief Complaint: Deborah Mireles is an 79 y.o. female here for an Annual Wellness visit.   HPI:      Still struggling with sleep issues/ chronic pain issues - no changes in regimens      Review of Systems:    Please see above.  The rest of the review of systems are negative for all systems.    Patient Care Team:  Emmanuelle Woodall MD as PCP - General  Emmanuelle Woodall MD as Assigned PCP     Patient  Active Problem List   Diagnosis     Chronic Constipation     Osteoarthritis Of The Knee     Osteoporosis     Atypical Chest Pain     Seborrheic Dermatitis     Seborrheic Dermatitis Of The Scalp     Itching (Pruritus)     Peripheral Neuropathy     Asymptomatic Coronary Arteriosclerosis     Hypermagnesemia     Restless Legs Syndrome     Hypertension     Spinal Stenosis     Epistaxis     Cerumen Impaction     Vertigo     Cough     Patellofemoral Syndrome     Onychomycosis Of The Toenails     Chronic cough     Acute bronchitis     Skin tag     Gastroesophageal reflux disease without esophagitis     Pseudotumor (inflammatory) of orbit, right     Right flank pain, chronic     Hammertoe of second toe of right foot     Bilateral hearing loss     Tinnitus, bilateral     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     Restless Legs Syndrome      Seborrheic Dermatitis      Spinal Stenosis      Vertigo       Past Surgical History:   Procedure Laterality Date     ABDOMINAL SURGERY       APPENDECTOMY       BACK SURGERY       CHOLECYSTECTOMY       COSMETIC SURGERY       EYE SURGERY       FRACTURE SURGERY       LUMBAR FUSION  08/2013     [place] DILATION/CURETTAGE,DIAGNOSTIC      Description: Dilation And Curettage;  Recorded: 02/02/2010;  Comments: due to miscarriage     [place] KNEE SCOPE,DIAGNOSTIC      Description: Arthroscopy Knee Right;  Recorded: 02/02/2010;  Comments: right knee - arthroscopy for debridement; then Synvisc     [place] REMOVAL GALLBLADDER      Description: Cholecystectomy;  Recorded: 02/02/2010;  Comments: age 30s      History reviewed. No pertinent family history.   Social  History     Socioeconomic History     Marital status:      Spouse name: Not on file     Number of children: Not on file     Years of education: Not on file     Highest education level: Not on file   Occupational History     Not on file   Social Needs     Financial resource strain: Not on file     Food insecurity:     Worry: Not on file     Inability: Not on file     Transportation needs:     Medical: Not on file     Non-medical: Not on file   Tobacco Use     Smoking status: Never Smoker     Smokeless tobacco: Never Used   Substance and Sexual Activity     Alcohol use: No     Drug use: No     Sexual activity: Not on file   Lifestyle     Physical activity:     Days per week: Not on file     Minutes per session: Not on file     Stress: Not on file   Relationships     Social connections:     Talks on phone: Not on file     Gets together: Not on file     Attends Latter day service: Not on file     Active member of club or organization: Not on file     Attends meetings of clubs or organizations: Not on file     Relationship status: Not on file     Intimate partner violence:     Fear of current or ex partner: Not on file     Emotionally abused: Not on file     Physically abused: Not on file     Forced sexual activity: Not on file   Other Topics Concern     Not on file   Social History Narrative     Not on file      Current Outpatient Medications   Medication Sig Dispense Refill     aspirin 81 mg chewable tablet Chew 81 mg daily.       calcium carbonate-vitamin D3 (CALCIUM 600 + D,3,) 600 mg(1,500mg) -200 unit per tablet Take 1 tablet by mouth 2 (two) times a day.       gabapentin (NEURONTIN) 300 MG capsule TAKE 2 CAPSULES THREE TIMES DAILY 540 capsule 3     glucos-msm-C-Mn-herb#21 (GLUCOSAMINE-MSM COMPLEX) Tab Take 1 tablet by mouth 2 (two) times a day.       hydroCHLOROthiazide (HYDRODIURIL) 25 MG tablet TAKE 1 TABLET EVERY DAY 90 tablet 1     MULTIVITAMIN (MULTIPLE VITAMIN ORAL) Take 1 tablet by mouth daily.        "nortriptyline (PAMELOR) 75 MG capsule TAKE 1 CAPSULE AT BEDTIME 90 capsule 2     No current facility-administered medications for this visit.       Objective:   Vital Signs:   Visit Vitals  /68 (Patient Site: Right Arm, Patient Position: Sitting, Cuff Size: Adult Regular)   Pulse 93   Temp 97.9  F (36.6  C) (Oral)   Resp 16   Ht 5' 3\" (1.6 m)   Wt 148 lb 4.8 oz (67.3 kg)   SpO2 97%   Breastfeeding? No   BMI 26.27 kg/m         VisionScreening:  No exam data present     PHYSICAL EXAM  EXAM:  /68 (Patient Site: Right Arm, Patient Position: Sitting, Cuff Size: Adult Regular)   Pulse 93   Temp 97.9  F (36.6  C) (Oral)   Resp 16   Ht 5' 3\" (1.6 m)   Wt 148 lb 4.8 oz (67.3 kg)   SpO2 97%   Breastfeeding? No   BMI 26.27 kg/m     Gen:  NAD, appears well, well-hydrated  HEENT:  TMs nl, oropharynx benign, nasal mucosa nl, conjunctiva clear  Neck:  Supple, no adenopathy, no thyromegaly, no carotid bruits, no JVD  Lungs:  Clear to auscultation bilaterally  Breast exam:  No breast lumps, no skin changes, no nipple discharge, no axillary adenopathy  Cor:  RRR no murmur  Abd:  Soft, nontender, BS+, no masses, no guarding or rebound, no HSM  Extr:  Neg.  Neuro:  No asymmetry, Nl motor tone/strength, decreased sensation at periphery, reflexes =, gait nl, nl coordination, CN intact,   Skin:  Warm/dry        Assessment Results 9/4/2019   Activities of Daily Living No help needed   Instrumental Activities of Daily Living No help needed   Mini Cog Total Score 5   Some recent data might be hidden     A Mini-Cog score of 0-2 suggests the possibility of dementia, score of 3-5 suggests no dementia    Identified Health Risks:     She is at risk for lack of exercise and has been provided with information to increase physical activity for the benefit of her well-being.  The patient was counseled and encouraged to consider modifying their diet and eating habits. She was provided with information on recommended healthy diet " options.  The patient was provided with written information regarding signs of hearing loss.  Patient's advanced directive was discussed and I am comfortable with the patient's wishes.

## 2021-06-02 ENCOUNTER — RECORDS - HEALTHEAST (OUTPATIENT)
Dept: ADMINISTRATIVE | Facility: CLINIC | Age: 81
End: 2021-06-02

## 2021-06-02 VITALS — WEIGHT: 149.44 LBS | BODY MASS INDEX: 26.47 KG/M2

## 2021-06-03 VITALS
OXYGEN SATURATION: 97 % | RESPIRATION RATE: 16 BRPM | HEART RATE: 93 BPM | BODY MASS INDEX: 26.28 KG/M2 | WEIGHT: 148.3 LBS | DIASTOLIC BLOOD PRESSURE: 68 MMHG | HEIGHT: 63 IN | SYSTOLIC BLOOD PRESSURE: 108 MMHG | TEMPERATURE: 97.9 F

## 2021-06-04 VITALS
TEMPERATURE: 97.5 F | SYSTOLIC BLOOD PRESSURE: 151 MMHG | WEIGHT: 150 LBS | RESPIRATION RATE: 16 BRPM | DIASTOLIC BLOOD PRESSURE: 82 MMHG | HEIGHT: 63 IN | HEART RATE: 91 BPM | BODY MASS INDEX: 26.58 KG/M2

## 2021-06-05 VITALS
RESPIRATION RATE: 18 BRPM | DIASTOLIC BLOOD PRESSURE: 79 MMHG | WEIGHT: 149 LBS | HEART RATE: 104 BPM | HEIGHT: 63 IN | OXYGEN SATURATION: 97 % | BODY MASS INDEX: 26.4 KG/M2 | SYSTOLIC BLOOD PRESSURE: 129 MMHG | TEMPERATURE: 98.6 F

## 2021-06-05 VITALS
SYSTOLIC BLOOD PRESSURE: 144 MMHG | DIASTOLIC BLOOD PRESSURE: 79 MMHG | HEART RATE: 94 BPM | BODY MASS INDEX: 26.75 KG/M2 | RESPIRATION RATE: 20 BRPM | WEIGHT: 151 LBS

## 2021-06-05 NOTE — TELEPHONE ENCOUNTER
Refill Approved    Rx renewed per Medication Renewal Policy. Medication was last renewed on 4/10/19.    Enedina Jackson, [facility] Triage/Med Refill 1/13/2020     Requested Prescriptions   Pending Prescriptions Disp Refills     hydroCHLOROthiazide (HYDRODIURIL) 25 MG tablet [Pharmacy Med Name: HYDROCHLOROTHIAZIDE 25 MG Tablet] 90 tablet 1     Sig: TAKE 1 TABLET EVERY DAY       Diuretics/Combination Diuretics Refill Protocol  Passed - 1/10/2020  8:39 PM        Passed - Visit with PCP or prescribing provider visit in past 12 months     Last office visit with prescriber/PCP: 4/2/2019 Emmanuelle Woodall MD OR same dept: 4/2/2019 Emmanuelle Woodall MD OR same specialty: 4/2/2019 Emmanuelle Woodall MD  Last physical: 9/4/2019 Last MTM visit: Visit date not found   Next visit within 3 mo: Visit date not found  Next physical within 3 mo: Visit date not found  Prescriber OR PCP: Emmanuelle Woodall MD  Last diagnosis associated with med order: 1. Essential hypertension  - hydroCHLOROthiazide (HYDRODIURIL) 25 MG tablet [Pharmacy Med Name: HYDROCHLOROTHIAZIDE 25 MG Tablet]; TAKE 1 TABLET EVERY DAY  Dispense: 90 tablet; Refill: 1    If protocol passes may refill for 12 months if within 3 months of last provider visit (or a total of 15 months).             Passed - Serum Potassium in past 12 months      Lab Results   Component Value Date    Potassium 4.1 09/04/2019             Passed - Serum Sodium in past 12 months      Lab Results   Component Value Date    Sodium 140 09/04/2019             Passed - Blood pressure on file in past 12 months     BP Readings from Last 1 Encounters:   09/04/19 108/68             Passed - Serum Creatinine in past 12 months      Creatinine   Date Value Ref Range Status   09/04/2019 0.90 0.60 - 1.10 mg/dL Final

## 2021-06-07 NOTE — PROGRESS NOTES
"Deborah Mireles is a 80 y.o. female who is being evaluated via a billable video visit.      The patient has been notified of following:     \"This video visit will be conducted via a call between you and your physician/provider. We have found that certain health care needs can be provided without the need for an in-person physical exam.  This service lets us provide the care you need with a video conversation.  If a prescription is necessary we can send it directly to your pharmacy.  If lab work is needed we can place an order for that and you can then stop by our lab to have the test done at a later time.    Video visits are billed at different rates depending on your insurance coverage. Please reach out to your insurance provider with any questions.    If during the course of the call the physician/provider feels a video visit is not appropriate, you will not be charged for this service.\"    Patient has given verbal consent to a Video visit? Yes    Patient would like to receive their AVS by AVS Preference: Brock.    Patient would like the video invitation sent by: Text to cell phone: [phone] -     Will anyone else be joining your video visit? No        Video Start Time: 1:48 PM    Additional provider notes:  ASSESSMENT/PLAN:  1. Otalgia, right  Ambulatory referral to ENT    cefdinir (OMNICEF) 300 MG capsule   2. Dizziness  Ambulatory referral to ENT       This is a 79 yo female (via failed video visit - converted to telephone visit) for:  1.  Right otalgia - was seen for these symptoms at the [facility] a couple weeks ago.  Was on some oral antibiotic and ear drops.  She doesn't think she improved in any significant fashion.  She continues to have difficulty, now with dizziness as well - we discussed that this is likely related to inner ear infection - likely viral.  This would explain her dizziness.  It is unusual that she hasn't improved with any certainty despite fairly aggressive treatment.  I will " "switch her to Cefdinir and refer to ENT.    No follow-ups on file.      There are no discontinued medications.  There are no Patient Instructions on file for this visit.    Chief Complaint:  Chief Complaint   Patient presents with     Headache     Generalized Body Aches       HPI:   Deborah Mireles is a 80 y.o. female c/o  Had an ear infection for a couple weeks - seen at [facility] 4/21/2020  Had \"inner ear\" and \"outer part\" - was on medication - Amoxicillin and some drops (Neomycin)  Hasn't helped any  Has had chills -   Had fever x 3 days,  99.6   Has gone for another few days  Weak and loses her balance  No appetite, headache,   Amoxicillin didn't seem to do anything for her    Scared by the balance issue  Is worried about falling -   Mom had problem with inner ear  No energy,    hasn't been sick at all    When burps, feels like it is pressure in the ear - doesn't last more than half a second          PMH:   Patient Active Problem List    Diagnosis Date Noted     Bilateral hearing loss 04/07/2019     Tinnitus, bilateral 04/07/2019     Hammertoe of second toe of right foot 07/25/2018     Right flank pain, chronic 11/03/2017     Pseudotumor (inflammatory) of orbit, 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     Gastroesophageal reflux disease without esophagitis 02/18/2016     Skin tag 12/25/2015     Acute bronchitis 09/14/2015     Chronic cough 01/05/2015     Onychomycosis Of The Toenails      Chronic Constipation      Osteoarthritis Of The Knee      Osteoporosis      Atypical Chest Pain      Seborrheic Dermatitis      Seborrheic Dermatitis Of The Scalp      Itching (Pruritus)      Peripheral Neuropathy      Asymptomatic Coronary Arteriosclerosis      Hypermagnesemia      Restless Legs Syndrome      Hypertension      Spinal Stenosis      Epistaxis      Cerumen Impaction      Vertigo      Cough      Patellofemoral Syndrome      Past Medical History:   Diagnosis Date     Asymptomatic Coronary Arteriosclerosis      Cerumen " Impaction      Chronic Constipation      Epistaxis      Gastroesophageal reflux disease without esophagitis 2/18/2016     Hypertension      Osteoarthritis Of The Knee      Osteoporosis      Patellofemoral Syndrome      Peripheral Neuropathy      Pseudotumor (inflammatory) of orbit, 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     Restless Legs Syndrome      Seborrheic Dermatitis      Spinal Stenosis      Vertigo      Past Surgical History:   Procedure Laterality Date     ABDOMINAL SURGERY       APPENDECTOMY       BACK SURGERY       CHOLECYSTECTOMY       COSMETIC SURGERY       EYE SURGERY       FRACTURE SURGERY       LUMBAR FUSION  08/2013     [facility] DILATION/CURETTAGE,DIAGNOSTIC      Description: Dilation And Curettage;  Recorded: 02/02/2010;  Comments: due to miscarriage     [facility] KNEE SCOPE,DIAGNOSTIC      Description: Arthroscopy Knee Right;  Recorded: 02/02/2010;  Comments: right knee - arthroscopy for debridement; then Synvisc     NH REMOVAL GALLBLADDER      Description: Cholecystectomy;  Recorded: 02/02/2010;  Comments: age 30s     Social History     Socioeconomic History     Marital status:      Spouse name: Not on file     Number of children: Not on file     Years of education: Not on file     Highest education level: Not on file   Occupational History     Not on file   Social Needs     Financial resource strain: Not on file     Food insecurity     Worry: Not on file     Inability: Not on file     Transportation needs     Medical: Not on file     Non-medical: Not on file   Tobacco Use     Smoking status: Never Smoker     Smokeless tobacco: Never Used   Substance and Sexual Activity     Alcohol use: No     Drug use: No     Sexual activity: Not on file   Lifestyle     Physical activity     Days per week: Not on file     Minutes per session: Not on file     Stress: Not on file   Relationships     Social connections     Talks on phone: Not on file     Gets together: Not on file     Attends Taoist service: Not on file     Active member of  club or organization: Not on file     Attends meetings of clubs or organizations: Not on file     Relationship status: Not on file     Intimate partner violence     Fear of current or ex partner: Not on file     Emotionally abused: Not on file     Physically abused: Not on file     Forced sexual activity: Not on file   Other Topics Concern     Not on file   Social History Narrative     Not on file     No family history on file.    Meds:    Current Outpatient Medications:      aspirin 81 mg chewable tablet, Chew 81 mg daily., Disp: , Rfl:      calcium carbonate-vitamin D3 (CALCIUM 600 + D,3,) 600 mg(1,500mg) -200 unit per tablet, Take 1 tablet by mouth 2 (two) times a day., Disp: , Rfl:      gabapentin (NEURONTIN) 300 MG capsule, TAKE 2 CAPSULES THREE TIMES DAILY, Disp: 540 capsule, Rfl: 3     glucos-msm-C-Mn-herb#21 (GLUCOSAMINE-MSM COMPLEX) Tab, Take 1 tablet by mouth 2 (two) times a day., Disp: , Rfl:      hydroCHLOROthiazide (HYDRODIURIL) 25 MG tablet, TAKE 1 TABLET EVERY DAY, Disp: 90 tablet, Rfl: 2     MULTIVITAMIN (MULTIPLE VITAMIN ORAL), Take 1 tablet by mouth daily., Disp: , Rfl:      nortriptyline (PAMELOR) 75 MG capsule, TAKE 1 CAPSULE AT BEDTIME, Disp: 90 capsule, Rfl: 2     cefdinir (OMNICEF) 300 MG capsule, Take 1 capsule (300 mg total) by mouth 2 (two) times a day for 10 days., Disp: 20 capsule, Rfl: 0    Allergies:  No Known Allergies    ROS:  Pertinent positives as noted in HPI; otherwise 12 point ROS negative.      Physical Exam:  EXAM:  There were no vitals taken for this visit.     This is a telephone visit.       Results:    Video-Visit Details    Type of service:  Video Visit;  1358 - 1416, 18 minutes (telephone visit, not video due to technology deficit)    Video End Time (time video stopped): 1416  Originating Location (pt. Location): Home    Distant Location (provider location):  [facility]     Platform used for Video Visit: Brittney Handley   MD Talisha

## 2021-06-09 NOTE — PROGRESS NOTES
"ASSESSMENT/PLAN:  1. Breast pain, right  US Breast Limited (Focal) Right    Mammo Diagnostic Bilateral    CANCELED: Mammo Diagnostic Right   2. Pseudotumor (inflammatory) of orbit, right         This is a 77-year-old female, seen today for a couple things.    1.  Patient is here today for follow-up on her right eye condition.  She is still a little bit confused about the diagnosis, and etiology.  She presented with some proptosis of her right eye, and evaluation by the eye doctor suggested proptosis.  He sent her back to me for thyroid testing, and labs are unremarkable.  A CT of the head and orbits, is unrevealing for any structural concern.  He has diagnosed her with pseudotumor of the right orbit.  She still confused by this designation, and we did discuss this.    2.  Patient also complains of right breast pain.  She was concerned by this, and attempted to schedule a mammogram.  She was unable to do so, as they felt she needed a diagnostic measure.  Her exam is reassuring other than the tenderness in the lateral right breast.  We will schedule her for a diagnostic study of the right breast.        There are no discontinued medications.  There are no Patient Instructions on file for this visit.    Chief Complaint:  Chief Complaint   Patient presents with     Eye Problem     right eye     Breast Pain       HPI:   Deborah Mireles is a 77 y.o. female c/o  Saw Dr. 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     Vertigo 04/05/2016     Gastroesophageal reflux disease without esophagitis 02/18/2016     Skin tag 12/25/2015     Acute bronchitis 09/14/2015     Chronic cough 01/05/2015 "     Onychomycosis Of The Toenails      Chronic Constipation      Osteoarthritis Of The Knee      Osteoporosis      Atypical Chest Pain      Seborrheic Dermatitis      Seborrheic Dermatitis Of The Scalp      Itching (Pruritus)      Peripheral Neuropathy      Asymptomatic Coronary Arteriosclerosis      Hypermagnesemia      Restless Legs Syndrome      Hypertension      Spinal Stenosis      Epistaxis      Cerumen Impaction      Vertigo      Cough      Patellofemoral Syndrome      History reviewed. No pertinent past medical history.  Past Surgical History:   Procedure Laterality Date     [facility] DILATION/CURETTAGE,DIAGNOSTIC      Description: Dilation And Curettage;  Recorded: 02/02/2010;  Comments: due to miscarriage     [facility] KNEE SCOPE,DIAGNOSTIC      Description: Arthroscopy Knee Right;  Recorded: 02/02/2010;  Comments: right knee - arthroscopy for debridement; then Synvisc     [facility] REMOVAL GALLBLADDER      Description: Cholecystectomy;  Recorded: 02/02/2010;  Comments: age 30s     Social History     Social History     Marital status:      Spouse name: N/A     Number of children: N/A     Years of education: N/A     Occupational History     Not on file.     Social History Main Topics     Smoking status: Never Smoker     Smokeless tobacco: Never Used     Alcohol use No     Drug use: No     Sexual activity: Not on file     Other Topics Concern     Not on file     Social History Narrative       Meds:    Current Outpatient Prescriptions:      aspirin 81 mg chewable tablet, Chew 81 mg daily., Disp: , Rfl:      calcium carbonate-vitamin D3 (CALCIUM 600 + D,3,) 600 mg(1,500mg) -200 unit per tablet, Take 1 tablet by mouth 2 (two) times a day., Disp: , Rfl:      fluticasone (FLONASE) 50 mcg/actuation nasal spray, 1 spray in each nostril once a day, Disp: 16 g, Rfl: 11     gabapentin (NEURONTIN) 300 MG capsule, TAKE 2 CAPSULES THREE TIMES DAILY, Disp: 540 capsule, Rfl: 2     glucos-msm-C-Mn-herb#21 (GLUCOSAMINE-MSM COMPLEX) Tab,  "Take 1 tablet by mouth 2 (two) times a day., Disp: , Rfl:      hydrochlorothiazide (HYDRODIURIL) 25 MG tablet, Take 1 tablet (25 mg total) by mouth daily., Disp: 90 tablet, Rfl: 3     MULTIVITAMIN (MULTIPLE VITAMIN ORAL), Take 1 tablet by mouth daily., Disp: , Rfl:      nortriptyline (PAMELOR) 75 MG capsule, TAKE ONE CAPSULE BY MOUTH EVERY NIGHT AT BEDTIME, Disp: 90 capsule, Rfl: 3    Allergies:  No Known Allergies    ROS:  Pertinent positives as noted in HPI; otherwise 12 point ROS negative.      Physical Exam:  EXAM:  Visit Vitals     /86     Pulse 72     Temp 97.6  F (36.4  C) (Oral)     Resp 14     Ht 5' 3.25\" (1.607 m)     Wt 153 lb 11.2 oz (69.7 kg)     BMI 27.01 kg/m2      Gen:  NAD, appears well, well-hydrated  HEENT:  TMs nl, oropharynx benign, nasal mucosa nl, conjunctiva clear, mild proptosis right eye  Neck:  Supple, no adenopathy, no thyromegaly, no carotid bruits, no JVD  Lungs:  Clear to auscultation bilaterally  Breast exam:  No breast lumps, no skin changes, no nipple discharge, no axillary adenopathy, tenderness overlying right lateral breast  Cor:  RRR no murmur  Abd:  Soft, nontender, BS+, no masses, no guarding or rebound, no HSM  Extr:  Neg.  Neuro:  No asymmetry  Skin:  Warm/dry        Results:  Results for orders placed or performed in visit on 12/21/16   Thyroid Stimulating Hormone (TSH)   Result Value Ref Range    TSH 3.27 0.30 - 5.00 uIU/mL   T4, Free   Result Value Ref Range    Free T4 0.9 0.7 - 1.8 ng/dL   T3 (Triiodothyronine), Free   Result Value Ref Range    T3, Free 2.7 1.9 - 3.9 pg/mL   Thyrotropin Receptor Antibody   Result Value Ref Range    Thyrotropin Receptor Antibody <1.00 0.00 - 1.75 IU/L           CT ORBITS WITHOUT IV CONTRAST  12/7/2016 5:53 PM     INDICATION: Right-sided proptosis.  TECHNIQUE: CT orbits without IV contrast. Dose reduction techniques were used.  COMPARISON: None.     FINDINGS: CT orbits demonstrates mild right-sided proptosis. There is no definite " abnormal mass within the right orbit. No definite inflammatory change or fluid collection. The extraocular muscles appear within normal limits. Both globes appear intact.   The bony orbits appear intact. Adjacent paranasal sinuses are clear. Limited intracranial views show some generalized cerebral and cerebellar atrophy. No definite abnormality in either cavernous sinus aside from calcific changes in both carotid siphons.     IMPRESSION:   CONCLUSION:  1. Mild right sided proptosis noted. This is of uncertain etiology. There is no abnormal mass, fluid collection or inflammatory change within the right orbit. Right globe intact.   2. Bony orbits intact. Adjacent paranasal sinuses clear.   3. Limited intracranial views show mild generalized cerebral and cerebellar atrophy.

## 2021-06-11 NOTE — PROGRESS NOTES
Assessment and Plan:     1. Encounter for Medicare annual wellness exam  HM1(CBC and Differential)    HM1 (CBC with Diff)   2. Bunion, right  Ambulatory referral to Podiatry   3. Tinnitus, bilateral     4. Peripheral Neuropathy     5. Hypermagnesemia  Magnesium   6. Hypertension  Comprehensive Metabolic Panel    Lipid [place] FASTING   7. Need for shingles vaccine  Varicella Zoster, Recombinant Vaccine IM   8. Need for immunization against influenza  Influenza,Quad,High Dose,PF 65 YR+   9. Asymptomatic menopause  DXA Bone Density Scan     This is a 81 yo female here for AWV:  1.  Bunion - right - severe - desires referral to a doctor she has extensively investigated online.  Is convinced it is the only surgery that is appropriate.  Wants referral to a podiatrist in [place] who does this procedure.  Discussed that this is not a procedure I'm familiar with - it is not clear that her insurance would cover this individual if he is out of network.    2.  Bilateral tinnitus - generally stable  3.  Peripheral Neuropathy - no change in this symptom  4.  Hypermagnesemia - check magnesium levels  5.  Hypertension - blood pressure is borderline - consider adjustment in medication -   6.  Health Maintenance - due for influenza vaccine - ordered; due for shingles vaccine - ordered; due for DXA scanning - ordered      The patient's current medical problems were reviewed.    I have had an Advance Directives discussion with the patient.  The following health maintenance schedule was reviewed with the patient and provided in printed form in the after visit summary:   Health Maintenance   Topic Date Due     DXA SCAN  01/16/2006     ZOSTER VACCINES (3 of 3) 11/03/2020     MEDICARE ANNUAL WELLNESS VISIT  09/08/2021     FALL RISK ASSESSMENT  09/08/2021     LIPID  09/08/2025     ADVANCE CARE PLANNING  09/08/2025     TD 18+ HE  08/09/2027     PNEUMOCOCCAL IMMUNIZATION 65+ LOW/MEDIUM RISK  Completed     INFLUENZA VACCINE RULE BASED   "Completed     HEPATITIS B VACCINES  Aged Out        Subjective:   Chief Complaint: Deborah Mireles is an 80 y.o. female here for an Annual Wellness visit.   HPI:      Son-in-law (age 53)  of heart attack - in [place]    Physically doing okay; emotionally - ?  Turns 81 in January?     Wants bunion surgery -   Wants particular surgery - Lapiplasty 3D bunion correction - online at [facility]  Dr. Alex Mares - DPM - in [place]  Hasn't seen this provider yet -     Symptoms - can't wear a shoe   Can't fit into a shoe; it's painful   Has \"wire\" in 2nd toe  Has sore at base of 2nd toe -     \"My insurance is really good, I'm sure they'll take care of it\"  \"I've read up on this, and even watched a Webinar;  We had to miss something else because I wanted to watch this\"  Has a list of questions to ask the doctor if she gets there  There is some physical therapy; boot x 4-6 weeks; foot elevated/iced    Nothing else \"major\"  Still dealing with her back - 7 years since surgery - still has a hot pad, vibrating massager she uses for her back  Sometimes, just has to be more careful  Can't sweep or vacuum -   Has cleaning lady - has a son that helps her as well     Went to [place] in July - by plane -   Went to 3 golf tournaments with grandson -   \"everything was fine\"  2 months ago -   Son-in-law passed away the day they were coming home from [place]    Has one daughter who has nothing else to do with patient and     Review of Systems:  Please see above.  The rest of the review of systems are negative for all systems.    Patient Care Team:  Emmanuelle Woodall MD as PCP - General  Emmanuelle Woodall MD as Assigned PCP     Patient Active Problem List   Diagnosis     Chronic Constipation     Osteoarthritis Of The Knee     Osteoporosis     Atypical Chest Pain     Seborrheic Dermatitis     Seborrheic Dermatitis Of The Scalp     Itching (Prur[place])     Peripheral Neuropathy     " Asymptomatic Coronary Arteriosclerosis     Hypermagnesemia     Restless Legs Syndrome     Hypertension     Spinal Stenosis     Epistaxis     Cerumen Impaction     Vertigo     Cough     Patellofemoral Syndrome     Onychomycosis Of The Toenails     Chronic cough     Acute bronchitis     Skin tag     Gastroesophageal reflux disease without esophagitis     Pseudotumor (inflammatory) of orbit, right     Right flank pain, chronic     Hammertoe of second toe of right foot     Bilateral hearing loss     Tinnitus, bilateral     Bunion, right     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     Restless Legs Syndrome      Seborrheic Dermatitis      Spinal Stenosis      Vertigo       Past Surgical History:   Procedure Laterality Date     ABDOMINAL SURGERY       APPENDECTOMY       BACK SURGERY       CHOLECYSTECTOMY       COSMETIC SURGERY       EYE SURGERY       FRACTURE SURGERY       LUMBAR FUSION  08/2013     MD DILATION/CURETTAGE,DIAGNOSTIC      Description: Dilation And Curettage;  Recorded: 02/02/2010;  Comments: due to miscarriage     MD KNEE SCOPE,DIAGNOSTIC      Description: Arthroscopy Knee Right;  Recorded: 02/02/2010;  Comments: right knee - arthroscopy for debridement; then Synvisc     MD REMOVAL GALLBLADDER      Description: Cholecystectomy;  Recorded: 02/02/2010;  Comments: age 30s      History reviewed. No pertinent family history.   Social History     Socioeconomic History     Marital status:      Spouse name: Not on file     Number of children: Not on file     Years of education: Not on file     Highest education level: Not on file   Occupational History     Not  on file   Social Needs     Financial resource strain: Not on file     Food insecurity     Worry: Not on file     Inability: Not on file     Transportation needs     Medical: Not on file     Non-medical: Not on file   Tobacco Use     Smoking status: Never Smoker     Smokeless tobacco: Never Used   Substance and Sexual Activity     Alcohol use: No     Drug use: No     Sexual activity: Not on file   Lifestyle     Physical activity     Days per week: Not on file     Minutes per session: Not on file     Stress: Not on file   Relationships     Social connections     Talks on phone: Not on file     Gets together: Not on file     Attends [employer] service: Not on file     Active member of club or organization: Not on file     Attends meetings of clubs or organizations: Not on file     Relationship status: Not on file     Intimate partner violence     Fear of current or ex partner: Not on file     Emotionally abused: Not on file     Physically abused: Not on file     Forced sexual activity: Not on file   Other Topics Concern     Not on file   Social History Narrative     Not on file      Current Outpatient Medications   Medication Sig Dispense Refill     aspirin 81 mg chewable tablet Chew 81 mg daily.       calcium carbonate-vitamin D3 (CALCIUM 600 + D,3,) 600 mg(1,500mg) -200 unit per tablet Take 1 tablet by mouth 2 (two) times a day.       gabapentin (NEURONTIN) 300 MG capsule TAKE 2 CAPSULES THREE TIMES DAILY 540 capsule 3     glucos-msm-C-Mn-herb#21 (GLUCOSAMINE-MSM COMPLEX) Tab Take 1 tablet by mouth 2 (two) times a day.       hydroCHLOROthiazide (HYDRODIURIL) 25 MG tablet TAKE 1 TABLET EVERY DAY 90 tablet 2     MULTIVITAMIN (MULTIPLE VITAMIN ORAL) Take 1 tablet by mouth daily.       nortriptyline (PAMELOR) 75 MG capsule TAKE 1 CAPSULE AT BEDTIME 90 capsule 2     No current facility-administered medications for this visit.       Objective:   Vital Signs:   Visit Vitals  /82 (Patient Site: Right Arm, Patient  "Position: Sitting, Cuff Size: Adult Regular)   Pulse 91   Temp 97.5  F (36.4  C)   Resp 16   Ht 5' 3\" (1.6 m)   Wt 150 lb (68 kg)   BMI 26.57 kg/m           VisionScreening:   Hearing Screening    125Hz 250Hz 500Hz 1000Hz 2000Hz 3000Hz 4000Hz 6000Hz 8000Hz   Right ear:            Left ear:               Visual Acuity Screening    Right eye Left eye Both eyes   Without correction:      With correction: 10/10 10/10 10/10        PHYSICAL EXAM  EXAM:  /82 (Patient Site: Right Arm, Patient Position: Sitting, Cuff Size: Adult Regular)   Pulse 91   Temp 97.5  F (36.4  C)   Resp 16   Ht 5' 3\" (1.6 m)   Wt 150 lb (68 kg)   BMI 26.57 kg/m     Gen:  NAD, appears well, well-hydrated  HEENT:  TMs nl, oropharynx benign, nasal mucosa nl, conjunctiva clear  Neck:  Supple, no adenopathy, no thyromegaly, no carotid bruits, no JVD  Lungs:  Clear to auscultation bilaterally  Breast exam:  No breast lumps, no skin changes, no nipple discharge, no axillary adenopathy  Cor:  RRR no murmur  Abd:  Soft, nontender, BS+, no masses, no guarding or rebound, no HSM  Extr:  Neg., large bunion deformity right foot with callus medial right great toe; deformities of right toes - with lateral bowing of toes  Neuro:  No asymmetry  Skin:  Warm/dry        Assessment Results 9/8/2020   Activities of Daily Living No help needed   Instrumental Activities of Daily Living No help needed   Mini Cog Total Score 4   Some recent data might be hidden     A Mini-Cog score of 0-2 suggests the possibility of dementia, score of 3-5 suggests no dementia  Fall risk:  No help needed  Cognitive:  No concerns     Identified Health Risks:     She is at risk for lack of exercise and has been provided with information to increase physical activity for the benefit of her well-being.  The patient was counseled and encouraged to consider modifying their diet and eating habits. She was provided with information on recommended healthy diet options.  The patient was " provided with written information regarding signs of hearing loss.  Patient's advanced directive was discussed and I am comfortable with the patient's wishes.

## 2021-06-12 NOTE — PROGRESS NOTES
"Assessment:      Healthy female exam.    1. Routine general medical examination at a health care facility     2. Hereditary and idiopathic peripheral neuropathy  nortriptyline (PAMELOR) 75 MG capsule    Ambulatory referral to Neurology    Vitamin B12    Thyroid Stimulating Hormone (TSH)   3. Need for tetanus booster  Tdap vaccine greater than or equal to 8yo IM   4. Pelvic joint pain, right  XR Hip Right 2 or More VWS   5. Hypermagnesemia  Magnesium   6. Essential hypertension with goal blood pressure less than 140/90  Comprehensive Metabolic Panel    Lipid Profile   7. Spinal Stenosis            Plan:      This is a 76 yo female here for physical exam -   1.  Neuropathy - will check labs - continues to complain of pain in legs - does have some neuropathic pain, but also pain possible related to right hip (but pain seems to radiate from superior iliac crest - and posterior mid buttocks) - Will check labs,.  2.  Pain in right \"hip\" - xray of right hip suggests mild arthritis  3.  Hypertension - BP improved.  Stable on current meds.  4.  Spinal Stenosis - may be part of current symptoms.  5.  Due for tetanus booster.      Subjective:      Deborah Mireles is a 77 y.o. female who presents for an annual exam.The patient reports that there is not domestic violence in her life.     Last colonoscopy December 2007:  Normal  No need for pap smear  Here for physical exam    Feet are \"really bad\"  Neuropathy - sharp pains in feet  Getting worse  \"I'm not sure how bad it needs to get before I do something\"    Saw Dr. Sanchez - had an \"attack\" one morning - couldn't move due to pain - couldn't get out of bed  Sent xrays to Dr. Awad - arthritis and bone spur - thinks that something twisted - gave her 2 Advil BID - \"inflamed\"  Once inflammation was gone, could \"back off\" -     Also has tremendous pain in right hip / iliac crest x 4-6 weeks - (in spite of taking her Advil regularly)  This occurred since seeing her back " doctor  Keeps her awake at night    Constipated constantly - hasn't taken anything -     Omeprazole -   Went to Dr. Vinson - pulmonary  Is on 2 tablets twice a day; wants to back off on that medication  Saw Dr. Vinson - 2016 - using Flonase nasal spray          Healthy Habits:   Regular Exercise: No  Sunscreen Use: Yes  Healthy Diet: Yes  Dental Visits Regularly: Yes  Seat Belt: Yes  Sexually active: Yes  Self Breast Exam Monthly:Yes  Hemoccults: no  Flex Sig: No  Colonoscopy: Yes        Immunization History   Administered Date(s) Administered     DT (pediatric) 2001     Influenza [id number], 2010     Influenza high dose, seasonal 2015     Influenza, inj, historic 10/23/2007, 10/29/2008, 2016     Influenza, seasonal,quad inj 6-35 mos 2009, 2010, 10/14/2011, 10/17/2012, 2013     Pneumo Conj 13-V (2010&after) 2016     Pneumo Polysac 23-V 2006     Td, historic 2007     Tdap 2007     ZOSTER 2012     Immunization status:  Noted/discussed.    No exam data present    Gynecologic History  No LMP recorded. Patient is postmenopausal.  Contraception: post menopausal status  Last Pap: <age 65. Results were: normal  Last mammogram: 2017. Results were: abnormal - had reassuring followup      OB History    Para Term  AB Living   3 3 3      SAB TAB Ectopic Multiple Live Births             # Outcome Date GA Lbr Flo/2nd Weight Sex Delivery Anes PTL Lv   3 Term            2 Term            1 Term                   Current Outpatient Prescriptions   Medication Sig Dispense Refill     aspirin 81 mg chewable tablet Chew 81 mg daily.       calcium carbonate-vitamin D3 (CALCIUM 600 + D,3,) 600 mg(1,500mg) -200 unit per tablet Take 1 tablet by mouth 2 (two) times a day.       fluticasone (FLONASE) 50 mcg/actuation nasal spray 1 spray in each nostril once a day 16 g 11     gabapentin (NEURONTIN) 300 MG capsule TAKE 2 CAPSULES THREE TIMES DAILY 540  capsule 2     glucos-msm-C-Mn-herb#21 (GLUCOSAMINE-MSM COMPLEX) Tab Take 1 tablet by mouth 2 (two) times a day.       hydroCHLOROthiazide (HYDRODIURIL) 25 MG tablet Take 1 tablet (25 mg total) by mouth daily. 90 tablet 3     MULTIVITAMIN (MULTIPLE VITAMIN ORAL) Take 1 tablet by mouth daily.       nortriptyline (PAMELOR) 75 MG capsule TAKE ONE CAPSULE BY MOUTH EVERY NIGHT AT BEDTIME 90 capsule 3     omeprazole (PRILOSEC) 20 MG capsule        No current facility-administered medications for this visit.      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     Restless Legs Syndrome      Seborrheic Dermatitis      Spinal Stenosis      Vertigo      Past Surgical History:   Procedure Laterality Date     LUMBAR FUSION  08/2013     OK DILATION/CURETTAGE,DIAGNOSTIC      Description: Dilation And Curettage;  Recorded: 02/02/2010;  Comments: due to miscarriage     OK KNEE SCOPE,DIAGNOSTIC      Description: Arthroscopy Knee Right;  Recorded: 02/02/2010;  Comments: right knee - arthroscopy for debridement; then Synvisc     OK REMOVAL GALLBLADDER      Description: Cholecystectomy;  Recorded: 02/02/2010;  Comments: age 31s     Review of patient's allergies indicates no known allergies.  History reviewed. No pertinent family history.  Social History     Social History     Marital status:      Spouse name: N/A     Number of children: N/A     Years of education: N/A     Occupational History     Not on file.     Social History Main Topics     Smoking status: Never Smoker     Smokeless tobacco: Never Used     Alcohol use No     Drug use: No     Sexual activity: Not on file     Other Topics  "Concern     Not on file     Social History Narrative       Review of Systems  Review of Systems    had [employer] screening - \"watching my arteries\"  Goes in yearly for evaluation        Objective:         Vitals:    08/09/17 0835 08/09/17 0837   BP: 142/80 126/76   Pulse: 92    Resp: 14    Temp: 97.8  F (36.6  C)    TempSrc: Oral    SpO2: 95%    Weight: 155 lb 3.2 oz (70.4 kg)    Height: 5' 3.5\" (1.613 m)      Body mass index is 27.06 kg/(m^2).    Physical  Physical Exam    EXAM:  /76 (Patient Site: Right Arm, Patient Position: Sitting, Cuff Size: Adult Regular)  Pulse 92  Temp 97.8  F (36.6  C) (Oral)   Resp 14  Ht 5' 3.5\" (1.613 m)  Wt 155 lb 3.2 oz (70.4 kg)  SpO2 95%  BMI 27.06 kg/m2   Gen:  NAD, appears well, well-hydrated  HEENT:  TMs nl, oropharynx benign, nasal mucosa nl, conjunctiva clear, mild exophthalmos of right eye, vision grossly normal, EOMI, PERRL  Neck:  Supple, no adenopathy, no thyromegaly, no carotid bruits, no JVD  Lungs:  Clear to auscultation bilaterally  Breast exam:  No breast lumps, no skin changes, no nipple discharge, no axillary adenopathy  Cor:  RRR no murmur  Abd:  Soft, nontender, BS+, no masses, no guarding or rebound, no HSM  Extr:  Neg.  Neuro:  No asymmetry, Nl motor tone/strength, nl sensation, reflexes =, gait nl, nl coordination, CN intact,   Skin:  Warm/dry        "

## 2021-06-12 NOTE — PROGRESS NOTES
[address] [address]  Dept: [phone]  Dept Fax: [phone]  Primary Provider: Emmanuelle Woodall MD  Pre-op Performing Provider: EMMANUELLE WOODALL    PREOPERATIVE EVALUATION:  Today's date: 11/9/2020    Deborah Mireles is a 80 y.o. female who presents for a preoperative evaluation.    Surgical Information:  Surgery/Procedure: Reconstructive foot surgery - hallux limitus with DJD forefoot, right  Surgery Location: [facility]  Surgeon: Dr. Alex Mares     Surgery Date: 11/18/2020  Time of Surgery: unknown  Where patient plans to recover: At home with family  Fax number for surgical facility: [phone]    Type of Anesthesia Anticipated: unknown per patient - info not available to me     Subjective     HPI related to upcoming procedure: Patient with large bunion on right foot - x years; can't wear a shoe comfortably; has pain      Preop Questions 11/9/2020   Have you ever had a heart attack or stroke? No   Have you ever had surgery on your heart or blood vessels, such as a stent placement, a coronary artery bypass, or surgery on an artery in your head, neck, heart, or legs? No   Do you have chest pain with activity? No   Do you have a history of  heart failure? No   Do you currently have a cold, bronchitis or symptoms of other infection? No   Do you have a cough, shortness of breath, or wheezing? No   Do you or anyone in your family have previous history of blood clots? No   Do you or does anyone in your family have a serious bleeding problem such as prolonged bleeding following surgeries or cuts? No   Have you ever had problems with anemia or been told to take iron pills? No   Have you had any abnormal blood loss such as black, tarry or bloody stools, or abnormal vaginal bleeding? No   Have you ever had a blood transfusion? No   Are you willing to have a blood transfusion if it is medically needed before, during, or after your  "surgery? Yes   Have you or any of your relatives ever had problems with anesthesia? No   Do you have sleep apnea, excessive snoring or daytime drowsiness? No   Do you have any artifical heart valves or other implanted medical devices like a pacemaker, defibrillator, or continuous glucose monitor? No   Do you have artificial joints? No   Are you allergic to latex? No     Health Care Directive:  Patient has a Health Care Directive on file.     Preoperative Review of :    reviewed - no record of controlled substances prescribed.  Does take Gabapentin regularly.     See problem list for active medical problems.  Problems all longstanding and stable, except as noted/documented.  See ROS for pertinent symptoms related to these conditions.    SLEEP PROBLEM - Patient has a longstanding history of insomnia.. Patient has tried OTC medications with limited success.  Was previously on other medications for sleep; still takes Gabapentin and Nortriptyline.       Review of Systems  CONSTITUTIONAL: NEGATIVE for fever, chills, change in weight  INTEGUMENTARY/SKIN: NEGATIVE for worrisome rashes, moles or lesions  EYES: NEGATIVE for vision changes or irritation  ENT/MOUTH: h/o dry mouth, occ dryness creating nosebleeds, tinnitus  RESP: NEGATIVE for significant cough or SOB  BREAST: NEGATIVE for masses, tenderness or discharge  CV: NEGATIVE for chest pain, palpitations or peripheral edema  GI: NEGATIVE for nausea, abdominal pain, heartburn, or change in bowel habits  : NEGATIVE for frequency, dysuria, or hematuria  MUSCULOSKELETAL: NEGATIVE for significant arthralgias or myalgia  MUSCULOSKELETAL: pain due to bunion right foot  NEURO: NEGATIVE for weakness, dizziness or paresthesias  ENDOCRINE: NEGATIVE for temperature intolerance, skin/hair changes  HEME: NEGATIVE for bleeding problems  PSYCHIATRIC: notes \"depression related to all this COVID and political uncertainty\"    Patient Active Problem List    Diagnosis Date Noted     " Bunion, right 09/08/2020     Bilateral hearing loss 04/07/2019     Tinnitus, bilateral 04/07/2019     Hammertoe of second toe of right foot 07/25/2018     Right flank pain, chronic 11/03/2017     Pseudotumor (inflammatory) of orbit, 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     Gastroesophageal reflux disease without esophagitis 02/18/2016     Skin tag 12/25/2015     Acute bronchitis 09/14/2015     Chronic cough 01/05/2015     Onychomycosis Of The Toenails      Chronic Constipation      Osteoarthritis Of The Knee      Osteoporosis      Atypical Chest Pain      Seborrheic Dermatitis      Seborrheic Dermatitis Of The Scalp      Itching (Pruritus)      Peripheral Neuropathy      Asymptomatic Coronary Arteriosclerosis      Hypermagnesemia      Restless Legs Syndrome      Hypertension      Spinal Stenosis      Epistaxis      Cerumen Impaction      Vertigo      Cough      Patellofemoral Syndrome      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     Restless Legs Syndrome      Seborrheic Dermatitis      Spinal Stenosis      Vertigo      Past Surgical History:   Procedure Laterality Date     ABDOMINAL SURGERY       APPENDECTOMY       BACK SURGERY       CHOLECYSTECTOMY       COSMETIC SURGERY       EYE SURGERY       FRACTURE SURGERY       LUMBAR FUSION  08/2013     [place] DILATION/CURETTAGE,DIAGNOSTIC      Description: Dilation And Curettage;  Recorded: 02/02/2010;  Comments: due to miscarriage     [place] KNEE SCOPE,DIAGNOSTIC      Description: Arthroscopy Knee Right;  Recorded: 02/02/2010;  Comments: right knee - arthroscopy for debridement; then Synvisc     [place] REMOVAL GALLBLADDER      Description: Cholecystectomy;  " Recorded: 02/02/2010;  Comments: age 30s     Current Outpatient Medications   Medication Sig Dispense Refill     aspirin 81 mg chewable tablet Chew 81 mg daily.       calcium carbonate-vitamin D3 (CALCIUM 600 + D,3,) 600 mg(1,500mg) -200 unit per tablet Take 1 tablet by mouth 2 (two) times a day.       gabapentin (NEURONTIN) 300 MG capsule TAKE 2 CAPSULES THREE TIMES DAILY 540 capsule 3     glucos-msm-C-Mn-herb#21 (GLUCOSAMINE-MSM COMPLEX) Tab Take 1 tablet by mouth 2 (two) times a day.       hydroCHLOROthiazide (HYDRODIURIL) 25 MG tablet TAKE 1 TABLET EVERY DAY 90 tablet 2     MULTIVITAMIN (MULTIPLE VITAMIN ORAL) Take 1 tablet by mouth daily.       nortriptyline (PAMELOR) 75 MG capsule TAKE 1 CAPSULE AT BEDTIME 90 capsule 2     No current facility-administered medications for this visit.        No Known Allergies    Social History     Tobacco Use     Smoking status: Never Smoker     Smokeless tobacco: Never Used   Substance Use Topics     Alcohol use: No      No family history on file.  Social History     Substance and Sexual Activity   Drug Use No        Objective     /79   Pulse (!) 104   Temp 98.6  F (37  C) (Tympanic)   Resp 18   Ht 5' 3\" (1.6 m)   Wt 149 lb (67.6 kg)   SpO2 97%   BMI 26.39 kg/m    Physical Exam    GENERAL APPEARANCE: healthy, alert and no distress     EYES: EOMI, PERRL     HENT: ear canals and TM's normal and nose and mouth without ulcers or lesions     NECK: no adenopathy, no asymmetry, masses, or scars and thyroid normal to palpation     RESP: lungs clear to auscultation - no rales, rhonchi or wheezes     BREAST: normal without masses, tenderness or nipple discharge and no palpable axillary masses or adenopathy     CV: regular rates and rhythm, normal S1 S2, no S3 or S4 and no murmur, click or rub     ABDOMEN:  soft, nontender, no HSM or masses and bowel sounds normal     MS: extremities normal- no gross deformities noted, no evidence of inflammation in joints, FROM in all " extremities.     MS: large bunion right foot; previous hammertoe right 2nd foot, general foot deformity     SKIN: no suspicious lesions or rashes     NEURO: Normal strength and tone, sensory exam grossly normal, mentation intact and speech normal     PSYCH: mentation appears normal. and affect normal/bright     LYMPHATICS: No cervical adenopathy    Recent Labs   Lab Test 11/09/20  1153 09/08/20  1038 09/04/19  0916   HGB 13.2 13.9 14.9   PLT  --  351 294   NA  --  140 140   K 3.9 3.9 4.1   CREATININE  --  0.80 0.90        PRE-OP Diagnostics:   Results for orders placed or performed in visit on 11/09/20   Potassium   Result Value Ref Range    Potassium 3.9 3.5 - 5.0 mmol/L   Hemoglobin   Result Value Ref Range    Hemoglobin 13.2 12.0 - 16.0 g/dL   Electrocardiogram Perform and Read   Result Value Ref Range    SYSTOLIC BLOOD PRESSURE      DIASTOLIC BLOOD PRESSURE      VENTRICULAR RATE 90 BPM    ATRIAL RATE 90 BPM    P-R INTERVAL 216 ms    QRS DURATION 82 ms    Q-T INTERVAL 378 ms    QTC CALCULATION (BEZET) 462 ms    P Axis 78 degrees    R AXIS 2 degrees    T AXIS 71 degrees    MUSE DIAGNOSIS       Sinus rhythm with 1st degree A-V block  Possible Left atrial enlargement  Borderline ECG  No previous ECGs available  Confirmed by ANIRUDH FORMAN, LES LOC: ([id number]) on 11/9/2020 3:42:25 PM         EKG required for known coronary heart disease and not completed in the last 90 days.    REVISED CARDIAC RISK INDEX (RCRI)   The patient has the following serious cardiovascular risks for perioperative complications:   - No serious cardiac risks = 0 points    RCRI INTERPRETATION: 1 point: Class II (low risk - 0.9% complication rate)         Assessment & Plan      The proposed surgical procedure is considered INTERMEDIATE risk.    1. Preop examination  Potassium    Electrocardiogram Perform and Read    Hemoglobin   2. Bunion, right     3. Coronary atherosclerosis due to lipid rich plaque     4. Hypertension     5. Peripheral Neuropathy      6. Tinnitus, bilateral       This is a 79 yo female with large bunion on right foot with pain, difficulty with shoe fit.  This creates overall foot deformity.  Now scheduled for procedure to correct bunion deformity/DJD associated with this.  Generally healthy and stable.  Labs/EKG ordered.  Will review prior to surgery.       Risks and Recommendations:  The patient has the following additional risks and recommendations for perioperative complications:   - No identified additional risk factors other than previously addressed    Medication Instructions:   - aspirin: Discontinue aspirin 7-10 days prior to procedure to reduce bleeding risk. It should be resumed postoperatively.     RECOMMENDATION:  APPROVAL GIVEN to proceed with proposed procedure, without further diagnostic evaluation.    Signed Electronically by: Emmanuelle Woodall MD    Copy of this evaluation report is provided to requesting physician.    [facility] Pre[facility] Guidelines    Revised Cardiac Risk Index

## 2021-06-13 NOTE — PROGRESS NOTES
"ASSESSMENT/PLAN:  1. Right flank pain, chronic  CT Abdomen Pelvis Without Oral Without IV Contrast       This is a 76 yo female with bilateral flank pain, R>L.  Exam is reassuring, but patient reports significant pain.  She is sure the Omeprazole is causing the pain (she read something on the package insert).  Omeprazole was started for chronic cough symptoms - which are now resolved.  We have reviewed that stopping the Omeprazole may bring back the symptoms, but patient would like to try this (she has already weaned down significantly).  So,stop Omeprazole. Check CT scan - abdomen/pelvis/stone run.  Reviewed previous workups        Medications Discontinued During This Encounter   Medication Reason     fluticasone (FLONASE) 50 mcg/actuation nasal spray Therapy completed     omeprazole (PRILOSEC) 20 MG capsule Side effects     There are no Patient Instructions on file for this visit.    Chief Complaint:  Chief Complaint   Patient presents with     Flank Pain     both side, patient states severe       HPI:   Deborah Mireles is a 77 y.o. female c/o  Has right flank pain - severe  Had xray at hospital in past   Seen by me in August   Last night - bad pain -     Has had pain > 1 year  Just right side; left side briefly hurt    Comes and goes  By evening it's bad  Mornings - it's pretty fine    Can't do \"stupid things\" -  Vacuuming, mowing bring it on    No pain down the legs    Patient's theory is the Omeprazole - (taking once a day) \"causes muscle pain or weakness\"   Started it for coughing - chronic cough    Worse at night b/c it \"builds up during the day\"    Wants to quit Flonase  Thinks she can get by without it.      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     Gastroesophageal reflux disease without esophagitis 02/18/2016     Skin tag 12/25/2015     Acute bronchitis 09/14/2015     Chronic cough " 01/05/2015     Onychomycosis Of The Toenails      Chronic Constipation      Osteoarthritis Of The Knee      Osteoporosis      Atypical Chest Pain      Seborrheic Dermatitis      Seborrheic Dermatitis Of The Scalp      Itching (Pruritus)      Peripheral Neuropathy      Asymptomatic Coronary Arteriosclerosis      Hypermagnesemia      Restless Legs Syndrome      Hypertension      Spinal Stenosis      Epistaxis      Cerumen Impaction      Vertigo      Cough      Patellofemoral Syndrome      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     Restless Legs Syndrome      Seborrheic Dermatitis      Spinal Stenosis      Vertigo      Past Surgical History:   Procedure Laterality Date     LUMBAR FUSION  08/2013     [place] DILATION/CURETTAGE,DIAGNOSTIC      Description: Dilation And Curettage;  Recorded: 02/02/2010;  Comments: due to miscarriage     [place] KNEE SCOPE,DIAGNOSTIC      Description: Arthroscopy Knee Right;  Recorded: 02/02/2010;  Comments: right knee - arthroscopy for debridement; then Synvisc     [place] REMOVAL GALLBLADDER      Description: Cholecystectomy;  Recorded: 02/02/2010;  Comments: age 31s     Social History     Social History     Marital status:      Spouse name: N/A     Number of children: N/A     Years of education: N/A     Occupational History     Not on file.     Social History Main Topics     Smoking status: Never Smoker     Smokeless tobacco: Never Used     Alcohol use No     Drug use: No     Sexual activity: Not on file     Other Topics Concern     Not on file     Social History Narrative       Meds:    Current Outpatient Prescriptions:      aspirin 81 mg  "chewable tablet, Chew 81 mg daily., Disp: , Rfl:      calcium carbonate-vitamin D3 (CALCIUM 600 + D,3,) 600 mg(1,500mg) -200 unit per tablet, Take 1 tablet by mouth 2 (two) times a day., Disp: , Rfl:      gabapentin (NEURONTIN) 300 MG capsule, TAKE 2 CAPSULES THREE TIMES DAILY, Disp: 540 capsule, Rfl: 2     glucos-msm-C-Mn-herb#21 (GLUCOSAMINE-MSM COMPLEX) Tab, Take 1 tablet by mouth 2 (two) times a day., Disp: , Rfl:      hydroCHLOROthiazide (HYDRODIURIL) 25 MG tablet, Take 1 tablet (25 mg total) by mouth daily., Disp: 90 tablet, Rfl: 3     MULTIVITAMIN (MULTIPLE VITAMIN ORAL), Take 1 tablet by mouth daily., Disp: , Rfl:      nortriptyline (PAMELOR) 75 MG capsule, TAKE ONE CAPSULE BY MOUTH EVERY NIGHT AT BEDTIME, Disp: 90 capsule, Rfl: 3    Allergies:  No Known Allergies    ROS:  Pertinent positives as noted in HPI; otherwise 12 point ROS negative.      Physical Exam:  EXAM:  /66  Pulse 90  Temp 97.2  F (36.2  C) (Oral)   Resp 20  Ht 5' 3\" (1.6 m)  Wt 154 lb (69.9 kg)  SpO2 95%  BMI 27.28 kg/m2   Gen:  NAD, appears well, well-hydrated  HEENT:  TMs nl, oropharynx benign, nasal mucosa nl, conjunctiva clear  Neck:  Supple, no adenopathy, no thyromegaly, no carotid bruits, no JVD  Lungs:  Clear to auscultation bilaterally  Cor:  RRR no murmur  Abd:  Soft, nontender, BS+, no masses, no guarding or rebound, no HSM, no CVAT  Extr:  Neg.  Neuro:  No asymmetry  Skin:  Warm/dry        Results:  Results for orders placed or performed in visit on 10/05/17   Electrophoresis, Protein, Serum   Result Value Ref Range    Albumin % 59.4 51.0 - 67.0 %    Albumin  4.5 3.2 - 4.7 g/dL    Alpha 1 % 2.1 2.0 - 4.0 %    Alpha 1 0.2 0.1 - 0.3 g/dL    Alpha 2 % 11.8 5.0 - 13.0 %    Alpha 2 0.9 0.4 - 0.9 g/dL    % Beta 13.1 10.0 - 17.0 %    Beta 1.0 0.7 - 1.2 g/dL    Gamma Globulin % 13.6 9.0 - 20.0 %    Gamma Globulin 1.0 0.6 - 1.4 g/dL    ELP Comment Unremarkable protein electrophoresis.       Protein, Total 7.6 6.0 - 8.0 g/dL "    Path ICD: G62.9     Interpreted By: Dejon Garay MD    Antinuclear Antibodies Screen (ALEJANDRO)   Result Value Ref Range    ALEJANDRO Screen Cascade 0.5 <=2.9 U   Rheumatoid Factor Screen   Result Value Ref Range    Rheumatoid Factor Quantitative <15.0 0 - 30 IU/mL   SS-A/RO Auto Antibodies   Result Value Ref Range    SS-A/Ro Autoantibodies 1 <20 EU   SS-B/LA Auto Antibodies   Result Value Ref Range    SS-B/La Autoantibodies 1 <20 EU

## 2021-06-15 ENCOUNTER — RECORDS - HEALTHEAST (OUTPATIENT)
Dept: ADMINISTRATIVE | Facility: OTHER | Age: 81
End: 2021-06-15

## 2021-06-15 ENCOUNTER — RECORDS - HEALTHEAST (OUTPATIENT)
Dept: CARDIOLOGY | Facility: CLINIC | Age: 81
End: 2021-06-15

## 2021-06-15 PROBLEM — H05.111: Status: ACTIVE | Noted: 2017-02-21

## 2021-06-15 NOTE — TELEPHONE ENCOUNTER
Requested Prescriptions     Pending Prescriptions Disp Refills     nortriptyline (PAMELOR) 75 MG capsule 90 capsule 2     Sig: Take 1 capsule (75 mg total) by mouth at bedtime.     gabapentin (NEURONTIN) 300 MG capsule 540 capsule 3     Sig: TAKE 2 CAPSULES THREE TIMES DAILY     hydroCHLOROthiazide (HYDRODIURIL) 25 MG tablet 90 tablet 2     Sig: Take 1 tablet (25 mg total) by mouth daily.

## 2021-06-16 PROBLEM — H93.13 TINNITUS, BILATERAL: Status: ACTIVE | Noted: 2019-04-07

## 2021-06-16 PROBLEM — G89.29 RIGHT FLANK PAIN, CHRONIC: Status: ACTIVE | Noted: 2017-11-03

## 2021-06-16 PROBLEM — M20.41 HAMMERTOE OF SECOND TOE OF RIGHT FOOT: Status: ACTIVE | Noted: 2018-07-25

## 2021-06-16 PROBLEM — H91.93 BILATERAL HEARING LOSS: Status: ACTIVE | Noted: 2019-04-07

## 2021-06-16 PROBLEM — R10.9 RIGHT FLANK PAIN, CHRONIC: Status: ACTIVE | Noted: 2017-11-03

## 2021-06-16 PROBLEM — M21.611 BUNION, RIGHT: Status: ACTIVE | Noted: 2020-09-08

## 2021-06-16 NOTE — TELEPHONE ENCOUNTER
RN cannot approve Refill Request    RN can NOT refill this medication Protocol failed and NO refill given. Last office visit: 2/23/2021 Emmanuelle Woodall MD Last Physical: 11/9/2020 Last MTM visit: Visit date not found Last visit same specialty: 2/23/2021 Emmanuelle Woodall MD.  Next visit within 3 mo: Visit date not found  Next physical within 3 mo: Visit date not found      Esther Hu, [facility] Triage/Med Refill 3/24/2021    Requested Prescriptions   Pending Prescriptions Disp Refills     nortriptyline (PAMELOR) 75 MG capsule 90 capsule 2     Sig: Take 1 capsule (75 mg total) by mouth at bedtime.       Tricyclics/Misc Antidepressant/Antianxiety Meds Refill Protocol Passed - 3/24/2021 10:47 AM        Passed - PCP or prescribing provider visit in last year     Last office visit with prescriber/PCP: 2/23/2021 Emmanuelle Woodall MD OR same dept: 2/23/2021 Emmanuelle Woodall MD OR same specialty: 2/23/2021 Emmanuelle Woodall MD  Last physical: 11/9/2020 Last MTM visit: Visit date not found   Next visit within 3 mo: Visit date not found  Next physical within 3 mo: Visit date not found  Prescriber OR PCP: Emmanuelle Woodall MD  Last diagnosis associated with med order: 1. Hereditary and idiopathic peripheral neuropathy  - nortriptyline (PAMELOR) 75 MG capsule; Take 1 capsule (75 mg total) by mouth at bedtime.  Dispense: 90 capsule; Refill: 2    2. Essential hypertension  - hydroCHLOROthiazide (HYDRODIURIL) 25 MG tablet; Take 1 tablet (25 mg total) by mouth daily.  Dispense: 90 tablet; Refill: 2    3. Restless leg syndrome  - gabapentin (NEURONTIN) 300 MG capsule  Dispense: 540 capsule; Refill: 3    If protocol passes may refill for 12 months if within 3 months of last provider visit (or a total of 15 months).                hydroCHLOROthiazide (HYDRODIURIL) 25 MG tablet 90 tablet 2     Sig: Take 1 tablet (25 mg total) by mouth daily.        Diuretics/Combination Diuretics Refill Protocol  Passed - 3/24/2021 10:47 AM        Passed - Visit with PCP or prescribing provider visit in past 12 months     Last office visit with prescriber/PCP: 2/23/2021 Emmanuelle Woodall MD OR same dept: 2/23/2021 Emmanuelle Woodall MD OR same specialty: 2/23/2021 Emmanuelle Woodall MD  Last physical: 11/9/2020 Last MTM visit: Visit date not found   Next visit within 3 mo: Visit date not found  Next physical within 3 mo: Visit date not found  Prescriber OR PCP: Emmanuelle Woodall MD  Last diagnosis associated with med order: 1. Hereditary and idiopathic peripheral neuropathy  - nortriptyline (PAMELOR) 75 MG capsule; Take 1 capsule (75 mg total) by mouth at bedtime.  Dispense: 90 capsule; Refill: 2    2. Essential hypertension  - hydroCHLOROthiazide (HYDRODIURIL) 25 MG tablet; Take 1 tablet (25 mg total) by mouth daily.  Dispense: 90 tablet; Refill: 2    3. Restless leg syndrome  - gabapentin (NEURONTIN) 300 MG capsule  Dispense: 540 capsule; Refill: 3    If protocol passes may refill for 12 months if within 3 months of last provider visit (or a total of 15 months).             Passed - Serum Potassium in past 12 months      Lab Results   Component Value Date    Potassium 3.9 11/09/2020             Passed - Serum Sodium in past 12 months      Lab Results   Component Value Date    Sodium 140 09/08/2020             Passed - Blood pressure on file in past 12 months     BP Readings from Last 1 Encounters:   02/23/21 144/79             Passed - Serum Creatinine in past 12 months      Creatinine   Date Value Ref Range Status   09/08/2020 0.80 0.60 - 1.10 mg/dL Final                glucosamn-msm-C-shady-herbal 21 (GLUCOSAMINE-MSM COMPLEX) Tab  0     Sig: Take 1 tablet by mouth 2 (two) times a day.       There is no refill protocol information for this order        calcium carbonate-vitamin D3 (CALCIUM 600 + D,3,) 600 mg(1,500mg) -200 unit per  tablet  0     Sig: Take 1 tablet by mouth 2 (two) times a day.       There is no refill protocol information for this order        gabapentin (NEURONTIN) 300 MG capsule 540 capsule 3       Gabapentin/Levetiracetam/Tiagabine Refill Protocol  Passed - 3/24/2021 10:47 AM        Passed - PCP or prescribing provider visit in past 12 months or next 3 months     Last office visit with prescriber/PCP: 2/23/2021 Emmanuelle Woodall MD OR same dept: 2/23/2021 Emmanuelle Woodall MD OR same specialty: 2/23/2021 Emmanuelle Woodall MD  Last physical: 11/9/2020 Last MTM visit: Visit date not found   Next visit within 3 mo: Visit date not found  Next physical within 3 mo: Visit date not found  Prescriber OR PCP: Emmanuelle Woodall MD  Last diagnosis associated with med order: 1. Hereditary and idiopathic peripheral neuropathy  - nortriptyline (PAMELOR) 75 MG capsule; Take 1 capsule (75 mg total) by mouth at bedtime.  Dispense: 90 capsule; Refill: 2    2. Essential hypertension  - hydroCHLOROthiazide (HYDRODIURIL) 25 MG tablet; Take 1 tablet (25 mg total) by mouth daily.  Dispense: 90 tablet; Refill: 2    3. Restless leg syndrome  - gabapentin (NEURONTIN) 300 MG capsule  Dispense: 540 capsule; Refill: 3    If protocol passes may refill for 12 months if within 3 months of last provider visit (or a total of 15 months).                aspirin 81 mg chewable tablet  0     Sig: Chew 1 tablet (81 mg total) daily.       Aspirin/Dipyridamole Refill Protocol Passed - 3/24/2021 10:47 AM        Passed - PCP or prescribing provider visit in past 12 months       Last office visit with prescriber/PCP: 2/23/2021 Emmanuelle Woodall MD OR same dept: 2/23/2021 Emmanuelle Woodall MD OR same specialty: 2/23/2021 Emmanuelle Woodall MD  Last physical: 11/9/2020 Last MTM visit: Visit date not found    Next appt within 3 mo: Visit date not found Next physical within 3 mo: Visit date not  found  Prescriber OR PCP: Emmanuelle Woodall MD  Last diagnosis associated with med order: 1. Hereditary and idiopathic peripheral neuropathy  - nortriptyline (PAMELOR) 75 MG capsule; Take 1 capsule (75 mg total) by mouth at bedtime.  Dispense: 90 capsule; Refill: 2    2. Essential hypertension  - hydroCHLOROthiazide (HYDRODIURIL) 25 MG tablet; Take 1 tablet (25 mg total) by mouth daily.  Dispense: 90 tablet; Refill: 2    3. Restless leg syndrome  - gabapentin (NEURONTIN) 300 MG capsule  Dispense: 540 capsule; Refill: 3    If protocol passes may refill for 6 months if within 3 months of last provider visit (or a total of 9 months).

## 2021-06-16 NOTE — TELEPHONE ENCOUNTER
Requested Prescriptions     Pending Prescriptions Disp Refills     nortriptyline (PAMELOR) 75 MG capsule 90 capsule 2     Sig: Take 1 capsule (75 mg total) by mouth at bedtime.     hydroCHLOROthiazide (HYDRODIURIL) 25 MG tablet 90 tablet 2     Sig: Take 1 tablet (25 mg total) by mouth daily.     glucosamn-msm-C-shady-herbal 21 (GLUCOSAMINE-MSM COMPLEX) Tab  0     Sig: Take 1 tablet by mouth 2 (two) times a day.     calcium carbonate-vitamin D3 (CALCIUM 600 + D,3,) 600 mg(1,500mg) -200 unit per tablet  0     Sig: Take 1 tablet by mouth 2 (two) times a day.     gabapentin (NEURONTIN) 300 MG capsule 540 capsule 3     aspirin 81 mg chewable tablet  0     Sig: Chew 1 tablet (81 mg total) daily.   ;

## 2021-06-16 NOTE — TELEPHONE ENCOUNTER
Reason for Call:  Medication or medication refill:    Do you use a [facility]?  Name of the pharmacy and phone number for the current request: [facility]    Name of the medication requested: all of the patients meds are needed for refills,  calling stating she received a call from pharmacy stating the rx's were never faxed over for refills>    Other request: n/a    Can we leave a detailed message on this number? Yes    Phone number patient can be reached at: Home number on file [phone] (home)    Best Time: asap please    Call taken on 3/24/2021 at 10:44 AM by Trinity Smith

## 2021-06-17 ENCOUNTER — OFFICE VISIT - HEALTHEAST (OUTPATIENT)
Dept: CARDIOLOGY | Facility: CLINIC | Age: 81
End: 2021-06-17

## 2021-06-17 DIAGNOSIS — R06.09 DYSPNEA ON EXERTION: ICD-10-CM

## 2021-06-17 ASSESSMENT — MIFFLIN-ST. JEOR: SCORE: 1124.06

## 2021-06-17 NOTE — PATIENT INSTRUCTIONS - HE
Patient Instructions by Emmanuelle Woodall MD at 9/4/2019  8:00 AM     Author: Emmanuelle Woodall MD Service: -- Author Type: Physician    Filed: 9/4/2019  8:36 AM Encounter Date: 9/4/2019 Status: Signed    : Emmanuelle Woodall MD (Physician)         Patient Education     Exercise for a Healthier Heart  You may wonder how you can improve the health of your heart. If youre thinking about exercise, youre on the right track. You dont need to become an athlete, but you do need a certain amount of brisk exercise to help strengthen your heart. If you have been diagnosed with a heart condition, your doctor may recommend exercise to help stabilize your condition. To help make exercise a habit, choose safe, fun activities.       Be sure to check with your health care provider before starting an exercise program.    Why exercise?  Exercising regularly offers many healthy rewards. It can help you do all of the following:    Improve your blood cholesterol levels to help prevent further heart trouble    Lower your blood pressure to help prevent a stroke or heart attack    Control diabetes, or reduce your risk of getting this disease    Improve your heart and lung function    Reach and maintain a healthy weight    Make your muscles stronger and more limber so you can stay active    Prevent falls and fractures by slowing the loss of bone mass (osteoporosis)    Manage stress better  Exercise tips  Ease into your routine. Set small goals. Then build on them.  Exercise on most days. Aim for a total of 150 or more minutes of moderate to  vigorous intensity activity each week. Consider 40 minutes, 3 to 4 times a week. For best results, activity should last for 40 minutes on average. It is OK to work up to the 40 minute period over time. Examples of moderate-intensity activity is walking one mile in 15 minutes or 30 to 45 minutes of yard work.  Step up your daily activity level. Along with your  exercise program, try being more active throughout the day. Walk instead of drive. Do more household tasks or yard work.  Choose one or more activities you enjoy. Walking is one of the easiest things you can do. You can also try swimming, riding a bike, or taking an exercise class.  Stop exercising and call your doctor if you:    Have chest pain or feel dizzy or lightheaded    Feel burning, tightness, pressure, or heaviness in your chest, neck, shoulders, back, or arms    Have unusual shortness of breath    Have increased joint or muscle pain    Have palpitations or an irregular heartbeat      [id number] [employer]. [address] [place] [address]. All rights reserved. This information is not intended as a substitute for professional medical care. Always follow your healthcare professional's instructions.         Patient Education   Understanding baseclick MyPlate  The USDA (US Department of Agriculture) has guidelines to help you make healthy food choices. These are called MyPlate. MyPlate shows the food groups that make up healthy meals using the image of a place setting. Before you eat, think about the healthiest choices for what to put onto your plate or into your cup or bowl. To learn more about building a healthy plate, visit www.choosemyplate.gov.       The Food Groups    Fruits: Any fruit or 100% fruit juice counts as part of the Fruit Group. Fruits may be fresh, canned, frozen, or dried, and may be whole, cut-up, or pureed. Make half your plate fruits and vegetables.    Vegetables: Any vegetable or 100% vegetable juice counts as a member of the Vegetable Group. Vegetables may be fresh, frozen, canned, or dried. They can be served raw or cooked and may be whole, cut-up, or mashed. Make half your plate fruits and vegetables.     Grains: All foods made from grains are part of the Grains Group. These include wheat, rice, oats, cornmeal, and barley such as bread, pasta, oatmeal, cereal,  tortillas, and grits. Grains should be no more than a quarter of your plate. At least half of your grains should be whole grains.    Protein: This group includes meat, poultry, seafood, beans and peas, eggs, processed soy products (like tofu), nuts (including nut butters), and seeds. Make protein choices no more than a quarter of your plate. Meat and poultry choices should be lean or low fat.    Dairy: All fluid milk products and foods made from milk that contain calcium, like yogurt and cheese are part of the Dairy Group. (Foods that have little calcium, such as cream, butter, and cream cheese, are not part of the group.) Most dairy choices should be low-fat or fat-free.    Oils: These are fats that are liquid at room temperature. They include canola, corn, olive, soybean, and sunflower oil. Foods that are mainly oil include mayonnaise, certain salad dressings, and soft margarines. You should have only 5 to 7 teaspoons of oils a day. You probably already get this much from the food you eat.  Use Pocket Change Card to Help Build Your Meals  The Tip[employer]cker can help you plan and track your meals and activity. You can look up individual foods to see or compare their nutritional value. You can get guidelines for what and how much you should eat. You can compare your food choices. And you can assess personal physical activities and see ways you can improve. Go to www.eMagin.gov/supertracker/.    4561-6575 [employer]. [address] [place] [address], [place] [address]. All rights reserved. This information is not intended as a substitute for professional medical care. Always follow your healthcare professional's instructions.           Patient Education   Signs of Hearing Loss  Hearing loss is a problem shared by many people. In fact, it is one of the most common health conditions, particularly as people age. Most people over age 65 have some hearing loss, and by age 80, almost everyone does. Because hearing loss  usually occurs slowly over the years, you may not realize your hearing ability has gotten worse.       Have your hearing checked  Contact your [facility] care provider if you:    Have to strain to hear normal conversation.    Have to watch other peoples faces very carefully to follow what theyre saying.    Need to ask people to repeat what theyve said.    Often misunderstand what people are saying.    Turn the volume of the television or radio up so high that others complain.    Feel that people are mumbling when theyre talking to you.    Find that the effort to hear leaves you feeling tired and irritated.    Notice, when using the phone, that you hear better with 1 ear than the other.    4918-5093 [employer]. [address] [place] [address], [place] [address]. All rights reserved. This information is not intended as a substitute for professional medical care. Always follow your healthcare professional's instructions.           Advance Directive  Patients advance directive was discussed and I am comfortable with the patients wishes.  Patient Education   Personalized Prevention Plan  You are due for the preventive services outlined below.  Your care team is available to assist you in scheduling these services.  If you have already completed any of these items, please share that information with your care team to update in your medical record.  Health Maintenance   Topic Date Due   ? DXA SCAN  01/16/2006   ? ZOSTER VACCINES (2 of 3) 06/27/2012   ? ADVANCE CARE PLANNING  12/14/2016   ? MEDICARE ANNUAL WELLNESS VISIT  08/09/2018   ? INFLUENZA VACCINE RULE BASED (1) 08/01/2019   ? FALL RISK ASSESSMENT  04/02/2020   ? TD 18+ HE  08/09/2027   ? PNEUMOCOCCAL POLYSACCHARIDE VACCINE AGE 65 AND OVER  Completed   ? PNEUMOCOCCAL CONJUGATE VACCINE FOR ADULTS (PCV13 OR PREVNAR)  Completed

## 2021-06-17 NOTE — PROGRESS NOTES
"ASSESSMENT/PLAN:  1. Family history of ischemic heart disease  Ambulatory referral to Cardiology       This is an 80 yo female who describes a family history of heart disease.  She is very worried about her own fate at this time.  We discussed that she has outlived all of her family (a 74 yo brother just passed away; she had uncles who  in their 40s, another brother passed away from heart disease at a younger age).  She is very concerned about what she should do.  I offered that controlling her blood pressure and cholesterol would be appropriate.  Today her BP is \"borderline\"; lipids were pretty good at last screening.  She is reluctant to take medications unless really necessary.      I think she would benefit from an evaluation by Cardiology to discuss her concerns and give consideration to any further intervention or workup that would be indicated.  This is her goal.  She is quite anxious about all of this.     No follow-ups on file.      Medications Discontinued During This Encounter   Medication Reason     aspirin 81 mg chewable tablet Non-compliance     There are no Patient Instructions on file for this visit.    Chief Complaint:  Chief Complaint   Patient presents with     Referral     Wants stress test       HPI:   Deborah Mireles is a 81 y.o. female c/o  Concerned -   Has 2nd brother with a heart attack   has a fib - had stress test/angios - no heart blockages    Mom had a stroke -     Patient quit her ASA 81 mg when she had     PMH:   Patient Active Problem List    Diagnosis Date Noted     Bunion, right 2020     Bilateral hearing loss 2019     Tinnitus, bilateral 2019     Hammertoe of second toe of right foot 2018     Right flank pain, chronic 2017     Pseudotumor (inflammatory) of orbit, right 2017     Gastroesophageal reflux disease without esophagitis 2016     Skin tag 2015     Acute bronchitis 2015     Chronic cough 2015     " Onychomycosis Of The Toenails      Chronic Constipation      Osteoarthritis Of The Knee      Osteoporosis      Atypical Chest Pain      Seborrheic Dermatitis      Seborrheic Dermatitis Of The Scalp      Itching (Pruritus)      Peripheral Neuropathy      Asymptomatic Coronary Arteriosclerosis      Hypermagnesemia      Restless Legs Syndrome      Hypertension      Spinal Stenosis      Epistaxis      Cerumen Impaction      Vertigo      Cough      Patellofemoral Syndrome      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     Restless Legs Syndrome      Seborrheic Dermatitis      Spinal Stenosis      Vertigo      Past Surgical History:   Procedure Laterality Date     ABDOMINAL SURGERY       APPENDECTOMY       BACK SURGERY       CHOLECYSTECTOMY       COSMETIC SURGERY       EYE SURGERY       FRACTURE SURGERY       LUMBAR FUSION  08/2013     PA DILATION/CURETTAGE,DIAGNOSTIC      Description: Dilation And Curettage;  Recorded: 02/02/2010;  Comments: due to miscarriage     PA KNEE SCOPE,DIAGNOSTIC      Description: Arthroscopy Knee Right;  Recorded: 02/02/2010;  Comments: right knee - arthroscopy for debridement; then Synvisc     PA REMOVAL GALLBLADDER      Description: Cholecystectomy;  Recorded: 02/02/2010;  Comments: age 30s     Social History     Socioeconomic History     Marital status:      Spouse name: Not on file     Number of children: Not on file     Years of education: Not on file     Highest education level: Not on file   Occupational History     Not on file   Social Needs     Financial resource strain: Not on file     Food insecurity     Worry: Not on file      Inability: Not on file     Transportation needs     Medical: Not on file     Non-medical: Not on file   Tobacco Use     Smoking status: Never Smoker     Smokeless tobacco: Never Used   Substance and Sexual Activity     Alcohol use: No     Drug use: No     Sexual activity: Not on file   Lifestyle     Physical activity     Days per week: Not on file     Minutes per session: Not on file     Stress: Not on file   Relationships     Social connections     Talks on phone: Not on file     Gets together: Not on file     Attends Congregational service: Not on file     Active member of club or organization: Not on file     Attends meetings of clubs or organizations: Not on file     Relationship status: Not on file     Intimate partner violence     Fear of current or ex partner: Not on file     Emotionally abused: Not on file     Physically abused: Not on file     Forced sexual activity: Not on file   Other Topics Concern     Not on file   Social History Narrative     Not on file     History reviewed. No pertinent family history.    Meds:    Current Outpatient Medications:      calcium carbonate-vitamin D3 (CALCIUM 600 + D,3,) 600 mg(1,500mg) -200 unit per tablet, Take 1 tablet by mouth 2 (two) times a day., Disp: , Rfl: 0     gabapentin (NEURONTIN) 300 MG capsule, TAKE 2 CAPSULES THREE TIMES DAILY, Disp: 540 capsule, Rfl: 3     glucosamn-msm-C-shady-herbal 21 (GLUCOSAMINE-MSM COMPLEX) Tab, Take 1 tablet by mouth 2 (two) times a day., Disp: , Rfl: 0     hydroCHLOROthiazide (HYDRODIURIL) 25 MG tablet, Take 1 tablet (25 mg total) by mouth daily., Disp: 90 tablet, Rfl: 2     MULTIVITAMIN (MULTIPLE VITAMIN ORAL), Take 1 tablet by mouth daily., Disp: , Rfl:      nortriptyline (PAMELOR) 75 MG capsule, Take 1 capsule (75 mg total) by mouth at bedtime., Disp: 90 capsule, Rfl: 2    Allergies:  No Known Allergies    ROS:  Pertinent positives as noted in HPI; otherwise 12 point ROS negative.      Physical Exam:  EXAM:  /81 (Patient Site:  Right Arm, Patient Position: Sitting, Cuff Size: Adult Regular)   Pulse 77   Temp 98.5  F (36.9  C) (Temporal)   Resp 14   Wt 153 lb (69.4 kg)   BMI 27.10 kg/m     Gen:  NAD, appears well, well-hydrated, quite anxious today  HEENT:  TMs nl, oropharynx benign, nasal mucosa nl, conjunctiva clear  Neck:  Supple, no adenopathy, no thyromegaly, no carotid bruits, no JVD  Lungs:  Clear to auscultation bilaterally  Cor:  RRR no murmur  Abd:  Soft, nontender, BS+, no masses, no guarding or rebound, no HSM  Extr:  Neg.  Neuro:  No asymmetry  Skin:  Warm/dry        Results:  Results for orders placed or performed in visit on 11/09/20   Potassium   Result Value Ref Range    Potassium 3.9 3.5 - 5.0 mmol/L   Hemoglobin   Result Value Ref Range    Hemoglobin 13.2 12.0 - 16.0 g/dL   Electrocardiogram Perform and Read   Result Value Ref Range    SYSTOLIC BLOOD PRESSURE      DIASTOLIC BLOOD PRESSURE      VENTRICULAR RATE 90 BPM    ATRIAL RATE 90 BPM    P-R INTERVAL 216 ms    QRS DURATION 82 ms    Q-T INTERVAL 378 ms    QTC CALCULATION (BEZET) 462 ms    P Axis 78 degrees    R AXIS 2 degrees    T AXIS 71 degrees    MUSE DIAGNOSIS       Sinus rhythm with 1st degree A-V block  Possible Left atrial enlargement  Borderline ECG  No previous ECGs available  Confirmed by ANIRUDH FORMAN, LES LOC:CHINYERE ([id number]) on 11/9/2020 3:42:25 PM

## 2021-06-18 NOTE — PATIENT INSTRUCTIONS - HE
Patient Instructions by Emmanuelle Woodall MD at 9/8/2020  9:20 AM     Author: Emmanuelle Woodall MD Service: -- Author Type: Physician    Filed: 9/8/2020  9:24 AM Encounter Date: 9/8/2020 Status: Signed    : Emmanuelle Woodall MD (Physician)         Patient Education     Exercise for a Healthier Heart  You may wonder how you can improve the health of your heart. If youre thinking about exercise, youre on the right track. You dont need to become an athlete, but you do need a certain amount of brisk exercise to help strengthen your heart. If you have been diagnosed with a heart condition, your doctor may recommend exercise to help stabilize your condition. To help make exercise a habit, choose safe, fun activities.       Be sure to check with your health care provider before starting an exercise program.    Why exercise?  Exercising regularly offers many healthy rewards. It can help you do all of the following:    Improve your blood cholesterol levels to help prevent further heart trouble    Lower your blood pressure to help prevent a stroke or heart attack    Control diabetes, or reduce your risk of getting this disease    Improve your heart and lung function    Reach and maintain a healthy weight    Make your muscles stronger and more limber so you can stay active    Prevent falls and fractures by slowing the loss of bone mass (osteoporosis)    Manage stress better  Exercise tips  Ease into your routine. Set small goals. Then build on them.  Exercise on most days. Aim for a total of 150 or more minutes of moderate to  vigorous intensity activity each week. Consider 40 minutes, 3 to 4 times a week. For best results, activity should last for 40 minutes on average. It is OK to work up to the 40 minute period over time. Examples of moderate-intensity activity is walking one mile in 15 minutes or 30 to 45 minutes of yard work.  Step up your daily activity level. Along with your  exercise program, try being more active throughout the day. Walk instead of drive. Do more household tasks or yard work.  Choose one or more activities you enjoy. Walking is one of the easiest things you can do. You can also try swimming, riding a bike, or taking an exercise class.  Stop exercising and call your doctor if you:    Have chest pain or feel dizzy or lightheaded    Feel burning, tightness, pressure, or heaviness in your chest, neck, shoulders, back, or arms    Have unusual shortness of breath    Have increased joint or muscle pain    Have palpitations or an irregular heartbeat      [id number] [employer]. [address] [place] [address]. All rights reserved. This information is not intended as a substitute for professional medical care. Always follow your healthcare professional's instructions.         Patient Education   Understanding [employer] MyPlate  The USDA (US Department of Agriculture) has guidelines to help you make healthy food choices. These are called MyPlate. MyPlate shows the food groups that make up healthy meals using the image of a place setting. Before you eat, think about the healthiest choices for what to put onto your plate or into your cup or bowl. To learn more about building a healthy plate, visit www.choosemyplate.gov.       The Food Groups    Fruits: Any fruit or 100% fruit juice counts as part of the Fruit Group. Fruits may be fresh, canned, frozen, or dried, and may be whole, cut-up, or pureed. Make half your plate fruits and vegetables.    Vegetables: Any vegetable or 100% vegetable juice counts as a member of the Vegetable Group. Vegetables may be fresh, frozen, canned, or dried. They can be served raw or cooked and may be whole, cut-up, or mashed. Make half your plate fruits and vegetables.     Grains: All foods made from grains are part of the Grains Group. These include wheat, rice, oats, cornmeal, and barley such as bread, pasta, oatmeal, cereal,  tortillas, and grits. Grains should be no more than a quarter of your plate. At least half of your grains should be whole grains.    Protein: This group includes meat, poultry, seafood, beans and peas, eggs, processed soy products (like tofu), nuts (including nut butters), and seeds. Make protein choices no more than a quarter of your plate. Meat and poultry choices should be lean or low fat.    Dairy: All fluid milk products and foods made from milk that contain calcium, like yogurt and cheese are part of the Dairy Group. (Foods that have little calcium, such as cream, butter, and cream cheese, are not part of the group.) Most dairy choices should be low-fat or fat-free.    Oils: These are fats that are liquid at room temperature. They include canola, corn, olive, soybean, and sunflower oil. Foods that are mainly oil include mayonnaise, certain salad dressings, and soft margarines. You should have only 5 to 7 teaspoons of oils a day. You probably already get this much from the food you eat.  Use T[employer]avrix to Help Build Your Meals  The OffScalecker can help you plan and track your meals and activity. You can look up individual foods to see or compare their nutritional value. You can get guidelines for what and how much you should eat. You can compare your food choices. And you can assess personal physical activities and see ways you can improve. Go to www.[employer].gov/supertracker/.    2379-3470 [employer]. [address] [place] [address], [place] [address]. All rights reserved. This information is not intended as a substitute for professional medical care. Always follow your healthcare professional's instructions.           Patient Education   Signs of Hearing Loss  Hearing loss is a problem shared by many people. In fact, it is one of the most common health conditions, particularly as people age. Most people over age 65 have some hearing loss, and by age 80, almost everyone does. Because hearing loss  usually occurs slowly over the years, you may not realize your hearing ability has gotten worse.       Have your hearing checked  Contact your [facility] care provider if you:    Have to strain to hear normal conversation.    Have to watch other peoples faces very carefully to follow what theyre saying.    Need to ask people to repeat what theyve said.    Often misunderstand what people are saying.    Turn the volume of the television or radio up so high that others complain.    Feel that people are mumbling when theyre talking to you.    Find that the effort to hear leaves you feeling tired and irritated.    Notice, when using the phone, that you hear better with 1 ear than the other.    5221-8339 [employer]. [address] [place] [address], [place] [address]. All rights reserved. This information is not intended as a substitute for professional medical care. Always follow your healthcare professional's instructions.           Advance Directive  Patients advance directive was discussed and I am comfortable with the patients wishes.  Patient Education   Personalized Prevention Plan  You are due for the preventive services outlined below.  Your care team is available to assist you in scheduling these services.  If you have already completed any of these items, please share that information with your care team to update in your medical record.  Health Maintenance   Topic Date Due   ? DXA SCAN  01/16/2006   ? ZOSTER VACCINES (2 of 3) 06/27/2012   ? INFLUENZA VACCINE RULE BASED (1) 08/01/2020   ? FALL RISK ASSESSMENT  09/04/2020   ? MEDICARE ANNUAL WELLNESS VISIT  09/04/2020   ? LIPID  09/04/2024   ? ADVANCE CARE PLANNING  09/04/2024   ? TD 18+ HE  08/09/2027   ? PNEUMOCOCCAL IMMUNIZATION 65+ LOW/MEDIUM RISK  Completed   ? HEPATITIS B VACCINES  Aged Out

## 2021-06-18 NOTE — PATIENT INSTRUCTIONS - HE
Patient Instructions by Emmanuelle Woodall MD at 2/23/2021  4:40 PM     Author: Emmanuelle Woodall MD Service: -- Author Type: Physician    Filed: 2/23/2021  5:00 PM Encounter Date: 2/23/2021 Status: Signed    : Emmanuelle Woodall MD (Physician)       Patient Education     Understanding Your Cholesterol Numbers     Blood flows easily when arteries are clear.      Less blood flows when cholesterol builds up in artery walls.   The higher your blood cholesterol, the greater your risk for heart attack, cardiovascular disease, or stroke. High cholesterol can cause any artery in your body to become narrow. Thats why you need to know your cholesterol level. If its high, you can take steps to bring it down. Eating the right foods and getting enough exercise can help. Some people also need medicine to control their cholesterol. Your healthcare provider can help you identify your personal risk through screenings tests and a discussion about different factors that many increase your chance for heart disease and stroke. These include family history, age, gender, ethnicity, and current health. Your healthcare provider can help you get started on a plan to control your cholesterol.  Checking your cholesterol  Your cholesterol is checked with a simple blood test. The results tell you how much cholesterol you have in your blood. Get checked as often as your healthcare provider suggests. Start monitoring your cholesterol levels regularly after age 20. Check it earlier if you have an increased risk for either high cholesterol or heart disease. If you have diabetes or high cholesterol, you may need your blood tested as often as every 3 months. As you work to lower your cholesterol, your numbers will change slowly. But they will change. Be patient and stay on track. Discuss your numbers with your healthcare provider.   Your total cholesterol number  A blood test will give you a number for the total  "amount of cholesterol in your blood. The higher this number, the more likely it is that cholesterol will build up in your blood vessels. Even if your cholesterol is just slightly high, you are at increased risk for health problems.  My total cholesterol is: ________________  Your lipid numbers  Total cholesterol is just one part of the story. Cholesterol is made up of different kinds of fats (lipids). If your total cholesterol is high, knowing your lipid profile is important. The 2 most important lipids are HDL and LDL. Your healthcare provider will tell you if you should fast before having your lipids checked. Fasting means you dont eat for a certain amount of time before the test is done. Along with cholesterol, triglyceride can also lead to blocked arteries. Triglycerides are another type of fat. Knowing your HDL, LDL, and triglyceride numbers, as well as your total cholesterol gives you a more complete picture of your cholesterol level:    HDL is called the good cholesterol. It moves the \"bad\" cholesterol out of the bloodstream and does not block your blood vessels. HDL levels are affected by how much you exercise and what you eat. This is the type of cholesterol that you don't want too little of. The higher the HDL, the better.My HDL cholesterol is: ________________    LDL is called the bad cholesterol. This is because it can stick to your artery walls and block blood flow. LDL levels are most affected by what you eat and by your genes. LDL cholesterol is clustered with other cholesterol types including apolipoprotein B (apoB) and lipoprotein a [Lp(a)]. Higher levels of these cholesterol types can increase your risk for atherosclerosis.My LDL cholesterol is: ________________    Triglyceride is a type of fat the body uses to store energy. Too much triglyceride can increase your risk for heart disease. Triglyceride levels should be under 150. If your triglyceride level is above 200 mg/dL your provider may want to " look at another cholesterol type called apolipoprotein B (apoB). My triglyceride is:  ________________  High cholesterol is only one of the big risk factor for heart disease heart attack, stroke, and peripheral artery disease. If your cholesterol levels are higher than normal, your healthcare provider will help you with steps to take to lower your levels. Steps may include lifestyle changes such as diet, physical activity, and quitting smoking. Your provider may also prescribe medicine to lower bad cholesterol levels. Based on your other health issues and risk factors, your healthcare provider may have specific goals for treating your cholesterol. You may need to use different kinds of medicines that work on the different types of cholesterol.   Some people may need to take medicines called statins to control their cholesterol. This is in addition to eating a healthy diet and getting regular exercise. Statins can help you stay healthy. They can also help prevent a heart attack or stroke. Also ask your provider about any side effects your medicines may cause. Let your provider know about any side effects you have. Make a plan to have regular cholesterol checks.   Work with your provider to know and understand what your cholesterol numbers mean, what your risk factors are and what are your treatment options and goals. Make sure you understand why these goals are important, based on your own health history and your family history of heart disease or high cholesterol. Stick with your treatment plan to reach the goals you set.  Date Last Reviewed: 10/1/2016    1460-2050 [employer]. [address] [place] [address], [place] [address]. All rights reserved. This information is not intended as a substitute for professional medical care. Always follow your healthcare professional's instructions.         Patient Education     Triglycerides  Does this test have other names?  Lipid panel, fasting lipoprotein panel  What is  "this test?  This test measures the amount of triglycerides in your blood.  Triglycerides are one of several types of fats in your blood. Other kinds are LDL (\"bad\") cholesterol and HDL (\"good\") cholesterol.  Knowing your triglyceride level is important, especially if you have diabetes, are overweight or a smoker, or are mostly inactive. High triglyceride levels may put you at greater risk for a heart attack or stroke.    This test is part of a group of cholesterol and blood fat tests called a fasting lipoprotein panel, or lipid panel. This panel is recommended for all adults at least once every 5 years, or as recommended by your healthcare provider.  Knowing your triglyceride level helps your healthcare provider suggest healthy changes to your diet or lifestyle. If you have triglycerides that are high to very high, your provider is more likely to prescribe medicines to lower your triglycerides or your LDL cholesterol.  Why do I need this test?  You may need this test as part of a routine checkup. You may also need this test if you're overweight, drink too much alcohol, rarely exercise, or have other conditions like high blood pressure or diabetes.  If you are on cholesterol-lowering medicines, you may have this test to see how well your treatment is working.  What other tests might I have along with this test?  Your healthcare provider will order screening tests for LDL, HDL, and total cholesterol.  What do my test results mean?  Test results may vary depending on your age, gender, health history, the method used for the test, and other things. Your test results may not mean you have a problem. Ask your healthcare provider what your test results mean for you.   Results are given in milligrams per deciliter (mg/dL). Normal levels of triglycerides are less than 150 mg/dL.  Here are how higher numbers are classified:    150 to 199 mg/dL: Borderline high    200 to 499 mg/dL: High    500 mg/dL and above: Very high  If " you have a high triglyceride level, you have a greater risk for heart attack and stroke.  A triglyceride level above 150 mg/dL also means that you may have an increased risk for metabolic syndrome. This is a cluster of symptoms including high blood pressure, high blood sugar, and high body fat around the waist. These symptoms have been linked to increased risk for diabetes, heart disease, and stroke.  High triglycerides levels can also be caused by certain diseases or inherited conditions.  If your triglyceride level is above 200 mg/dL, your healthcare provider may recommend that you:    Lose weight    Limit high-fat foods containing saturated fats. These are animal fats found in meat, butter, and whole milk.    Limit trans fats, which are found in many processed foods like chips and store-bought cookies    Cut back on drinks with added sugars, such as soda    Limit your alcohol intake    Stop smoking    Control your blood pressure    Exercise for at least 30 minutes a day, 5 days a week    Limit the calories from fat in your diet to 25% to 35% of your total intake  If your triglycerides are extremely high--above 500 mg/dL--you may have an added risk for problems with your pancreas. You will likely need medicine to lower your levels along with recommended changes in diet and lifestyle.  How is this test done?  The test is done with a blood sample. A needle is used to draw blood from a vein in your arm or hand.   Does this test pose any risks?  Having a blood test with a needle carries some risks. These include bleeding, infection, bruising, and feeling lightheaded. When the needle pricks your arm or hand, you may feel a slight sting or pain. Afterward, the site may be sore.   What might affect my test results?  Not fasting for the required length of time before the test can affect your results. Certain medicines can affect your results, as can drinking alcohol.  How do I prepare for the test?  If you have this test  as part of a cholesterol screening, you will need to not eat or drink anything but water for 9 to 14 hours before the test. Be sure your healthcare provider knows about all medicines, herbs, vitamins, and supplements you are taking. This includes medicines that don't need a prescription and any illicit drugs you may use.    1212-9958 [employer]. [address] [place] [address], [place] [id number]. All rights reserved. This information is not intended as a substitute for professional medical care. Always follow your healthcare professional's instructions.

## 2021-06-19 NOTE — PROGRESS NOTES
"ASSESSMENT/PLAN:  1. Peripheral Neuropathy  Ambulatory referral to Podiatry   2. Hammertoe of second toe of right foot  Ambulatory referral to Podiatry   3. Restless Legs Syndrome         This is a 77 yo female with :  1.  Bilateral foot pain/peripheral neuropathy - has seen neurology and was given Duloxetine.  She read the package insert and decided that it was too risky and shouldn't be used.  She now thinks she needs a \"second opinion.\"  I think it is reasonable for her to see a podiatrist and assess her feet as well.  2.  Hammertoe of second toe of right foot - this, too, adds to her foot pain.  With this as well, it is reasonable to seek attention from the podiatrist.  3.  Restless Legs Syndrome - patient is generally under control.  Continue to follow - does use Nortriptyline.          There are no discontinued medications.  There are no Patient Instructions on file for this visit.    Chief Complaint:  Chief Complaint   Patient presents with     Feet problem     referral to another neurologist       HPI:   Deborah AZUL Roberto Carlosaicha is a 78 y.o. female c/o  Went to Neurologist - got medicine for her feet -   Read the side effects and didn't take it  Duloxetine - 30 mg daily - never took it  Some of her symptoms have changed    Now, more worried that she is losing feeling in her feet  Right foot swells more than left   Right 2nd toe is a hammertoe    Wants to back off on glucosamine - taking 2/day  Taking calcium/Vitamin D two times a day   (OK to back off on glucosamine as able)        PMH:   Patient Active Problem List    Diagnosis Date Noted     Hammertoe of second toe of right foot 07/25/2018     Right flank pain, chronic 11/03/2017     Pseudotumor (inflammatory) of orbit, 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     Gastroesophageal reflux disease without esophagitis 02/18/2016     Skin tag 12/25/2015     Acute bronchitis 09/14/2015     Chronic cough 01/05/2015     Onychomycosis Of The Toenails      Chronic Constipation      " Osteoarthritis Of The Knee      Osteoporosis      Atypical Chest Pain      Seborrheic Dermatitis      Seborrheic Dermatitis Of The Scalp      Itching (Pruritus)      Peripheral Neuropathy      Asymptomatic Coronary Arteriosclerosis      Hypermagnesemia      Restless Legs Syndrome      Hypertension      Spinal Stenosis      Epistaxis      Cerumen Impaction      Vertigo      Cough      Patellofemoral Syndrome      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     Restless Legs Syndrome      Seborrheic Dermatitis      Spinal Stenosis      Vertigo      Past Surgical History:   Procedure Laterality Date     LUMBAR FUSION  08/2013     AL DILATION/CURETTAGE,DIAGNOSTIC      Description: Dilation And Curettage;  Recorded: 02/02/2010;  Comments: due to miscarriage     AL KNEE SCOPE,DIAGNOSTIC      Description: Arthroscopy Knee Right;  Recorded: 02/02/2010;  Comments: right knee - arthroscopy for debridement; then Synvisc     AL REMOVAL GALLBLADDER      Description: Cholecystectomy;  Recorded: 02/02/2010;  Comments: age 31s     Social History     Social History     Marital status:      Spouse name: N/A     Number of children: N/A     Years of education: N/A     Occupational History     Not on file.     Social History Main Topics     Smoking status: Never Smoker     Smokeless tobacco: Never Used     Alcohol use No     Drug use: No     Sexual activity: Not on file     Other Topics Concern     Not on file     Social History Narrative       Meds:    Current Outpatient Prescriptions:      aspirin 81 mg chewable tablet, Chew 81 mg daily., Disp: , Rfl:      calcium carbonate-vitamin D3  (CALCIUM 600 + D,3,) 600 mg(1,500mg) -200 unit per tablet, Take 1 tablet by mouth 2 (two) times a day., Disp: , Rfl:      gabapentin (NEURONTIN) 300 MG capsule, TAKE 2 CAPSULES THREE TIMES DAILY, Disp: 540 capsule, Rfl: 1     glucos-msm-C-Mn-herb#21 (GLUCOSAMINE-MSM COMPLEX) Tab, Take 1 tablet by mouth 2 (two) times a day., Disp: , Rfl:      hydroCHLOROthiazide (HYDRODIURIL) 25 MG tablet, TAKE 1 TABLET ONE TIME DAILY, Disp: 90 tablet, Rfl: 1     MULTIVITAMIN (MULTIPLE VITAMIN ORAL), Take 1 tablet by mouth daily., Disp: , Rfl:      nortriptyline (PAMELOR) 75 MG capsule, TAKE ONE CAPSULE BY MOUTH EVERY NIGHT AT BEDTIME, Disp: 90 capsule, Rfl: 3    Allergies:  No Known Allergies    ROS:  Pertinent positives as noted in HPI; otherwise 12 point ROS negative.      Physical Exam:  EXAM:  /68 (Patient Site: Right Arm, Patient Position: Sitting, Cuff Size: Adult Regular)  Pulse 82  Temp 97.6  F (36.4  C) (Oral)   Resp 20  Wt 151 lb (68.5 kg)  Breastfeeding? No  BMI 26.75 kg/m2   Gen:  NAD, appears well, well-hydrated  HEENT:  TMs nl, oropharynx benign, nasal mucosa nl, conjunctiva clear  Neck:  Supple, no adenopathy, no thyromegaly, no carotid bruits, no JVD  Lungs:  Clear to auscultation bilaterally  Cor:  RRR no murmur  Abd:  Soft, nontender, BS+, no masses, no guarding or rebound, no HSM  Extr:  Neg.  Feet:  Sl decreased sensation at distal feet - altered perception per patient  Has hammertoe on right 2nd toe with large callus at tip of toe  Neuro:  No asymmetry, Nl motor tone/strength, nl sensation, reflexes =, gait nl, nl coordination, CN intact,   Skin:  Warm/dry        Results:  Results for orders placed or performed in visit on 10/05/17   Electrophoresis, Protein, Serum   Result Value Ref Range    Albumin % 59.4 51.0 - 67.0 %    Albumin  4.5 3.2 - 4.7 g/dL    Alpha 1 % 2.1 2.0 - 4.0 %    Alpha 1 0.2 0.1 - 0.3 g/dL    Alpha 2 % 11.8 5.0 - 13.0 %    Alpha 2 0.9 0.4 - 0.9 g/dL    % Beta 13.1 10.0 - 17.0 %     Beta 1.0 0.7 - 1.2 g/dL    Gamma Globulin % 13.6 9.0 - 20.0 %    Gamma Globulin 1.0 0.6 - 1.4 g/dL    ELP Comment Unremarkable protein electrophoresis.       Protein, Total 7.6 6.0 - 8.0 g/dL    Path ICD: G62.9     Interpreted By: Dejon Garay MD    Antinuclear Antibodies Screen (ALEJANDRO)   Result Value Ref Range    ALEJANDRO Screen Cascade 0.5 <=2.9 U   Rheumatoid Factor Screen   Result Value Ref Range    Rheumatoid Factor Quantitative <15.0 0 - 30 IU/mL   SS-A/RO Auto Antibodies   Result Value Ref Range    SS-A/Ro Autoantibodies 1 <20 EU   SS-B/LA Auto Antibodies   Result Value Ref Range    SS-B/La Autoantibodies 1 <20 EU

## 2021-06-19 NOTE — ANESTHESIA PREPROCEDURE EVALUATION
Anesthesia Evaluation      Patient summary reviewed   No history of anesthetic complications     Airway   Mallampati: II  Neck ROM: full   Pulmonary - normal exam    breath sounds clear to auscultation  (-) sleep apnea, not a smoker                         Cardiovascular   Exercise tolerance: > or = 4 METS  (+) hypertension, CAD, ,     (-) angina, murmur  ECG reviewed  Rhythm: regular  Rate: normal,    no murmur   ROS comment: Minimal CAD by CT angio in 2012     Neuro/Psych    (+) neuromuscular disease,      Comments: SS  RLS  Pseudotumor of the orbit  Vertigo  Neuropathy    Endo/Other    (+) arthritis,      GI/Hepatic/Renal    (+) GERD,       Comments: constipation          Dental - normal exam   (+) caps                       Anesthesia Plan  Planned anesthetic: MAC    ASA 3   Induction: intravenous   Anesthetic plan and risks discussed with: patient and spouse  Anesthesia plan special considerations: antiemetics,   Post-op plan: routine recovery

## 2021-06-19 NOTE — ANESTHESIA CARE TRANSFER NOTE
Last vitals:   Vitals:    08/08/18 1140   BP: (P) 143/72   Pulse: (P) 99   Resp: (P) 14   Temp: (P) 36.2  C (97.1  F)   SpO2: (P) 99%     Patient's level of consciousness is drowsy  Spontaneous respirations: yes  Maintains airway independently: yes  Dentition unchanged: yes  Oropharynx: oropharynx clear of all foreign objects    QCDR Measures:  ASA# 20 - Surgical Safety Checklist: WHO surgical safety checklist completed prior to induction  PQRS# 430 - Adult PONV Prevention: 4558F - Pt received => 2 anti-emetic agents (different classes) preop & intraop  ASA# 8 - Peds PONV Prevention: NA - Not pediatric patient, not GA or 2 or more risk factors NOT present  PQRS# 424 - Kacy-op Temp Management: NA - MAC anesthesia or case < 60 minutes  PQRS# 426 - PACU Transfer Protocol: - Transfer of care checklist used  ASA# 14 - Acute Post-op Pain: ASA14B - Patient did NOT experience pain >= 7 out of 10

## 2021-06-19 NOTE — ANESTHESIA POSTPROCEDURE EVALUATION
Patient: Deborah AZUL [place]  ARTHROPLASTY 2ND DIGIT RIGHT FOOT  Anesthesia type: MAC    Patient location: Phase II Recovery  Last vitals:   Vitals:    08/08/18 1333   BP: 167/77   Pulse: 88   Resp:    Temp:    SpO2: 95%     Post vital signs: stable  Level of consciousness: awake and responds to simple questions  Post-anesthesia pain: pain controlled  Post-anesthesia nausea and vomiting: no  Pulmonary: unassisted, return to baseline  Cardiovascular: stable and blood pressure at baseline  Hydration: adequate  Anesthetic events: no    QCDR Measures:  ASA# 11 - Kacy-op Cardiac Arrest: ASA11B - Patient did NOT experience unanticipated cardiac arrest  ASA# 12 - Kacy-op Mortality Rate: ASA12B - Patient did NOT die  ASA# 13 - PACU Re-Intubation Rate: NA - No ETT / LMA used for case  ASA# 10 - Composite Anes Safety: ASA10A - No serious adverse event    Additional Notes:

## 2021-06-19 NOTE — PROGRESS NOTES
Preoperative Exam    Scheduled Procedure: Arthroplasty 2nd digit on right foot  Surgery Date:  08/08/18  Surgery Location: [facility], fax [phone]    Surgeon:  Dr. Hanna    Assessment/Plan:     1. Preop examination  Electrocardiogram Perform and Read    XR Chest 2 Views    CANCELED: Potassium    CANCELED: Hemoglobin   2. Hammertoe of second toe of right foot     3. Restless Legs Syndrome     4. Peripheral Neuropathy             Surgical Procedure Risk: Intermediate (reported cardiac risk generally 1-5%)  Have you had prior anesthesia?: Yes  Have you or any family members had a previous anesthesia reaction:  No  Do you or any family members have a history of a clotting or bleeding disorder?: No  Cardiac Risk Assessment: no increased risk for major cardiac complications    Patient approved for surgery with general or local anesthesia.    Please Note:  no specific concerns   Discussed Advanced Directives with patient - asked her to complete forms (provided today).  It is clear she would like her  to be her advocate; if he is unable, then her daughter.    Functional Status: Independent  Patient plans to recover at home with family.     Subjective:      Deborah Mireles is a 78 y.o. female who presents for a preoperative consultation.      Patient with right 2nd digit hammertoe - causing pain/callus and deformity in that toe,     All other systems reviewed and are negative, other than those listed in the HPI.    Pertinent History  Do you have difficulty breathing or chest pain after walking up a flight of stairs: No  History of obstructive sleep apnea: No  Steroid use in the last 6 months: No  Frequent Aspirin/NSAID use: Yes: Discontinued prior to surgery  Prior Blood Transfusion: No  Prior Blood Transfusion Reaction: No  If for some reason prior to, during or after the procedure, if it is medically indicated, would you be willing to have a blood transfusion?:  There is no transfusion  refusal.    Current Outpatient Prescriptions   Medication Sig Dispense Refill     aspirin 81 mg chewable tablet Chew 81 mg daily.       calcium carbonate-vitamin D3 (CALCIUM 600 + D,3,) 600 mg(1,500mg) -200 unit per tablet Take 1 tablet by mouth 2 (two) times a day.       gabapentin (NEURONTIN) 300 MG capsule TAKE 2 CAPSULES THREE TIMES DAILY 540 capsule 1     glucos-msm-C-Mn-herb#21 (GLUCOSAMINE-MSM COMPLEX) Tab Take 1 tablet by mouth 2 (two) times a day.       hydroCHLOROthiazide (HYDRODIURIL) 25 MG tablet TAKE 1 TABLET ONE TIME DAILY 90 tablet 1     MULTIVITAMIN (MULTIPLE VITAMIN ORAL) Take 1 tablet by mouth daily.       nortriptyline (PAMELOR) 75 MG capsule TAKE ONE CAPSULE BY MOUTH EVERY NIGHT AT BEDTIME 90 capsule 3     No current facility-administered medications for this visit.         No Known Allergies    Patient Active Problem List   Diagnosis     Chronic Constipation     Osteoarthritis Of The Knee     Osteoporosis     Atypical Chest Pain     Seborrheic Dermatitis     Seborrheic Dermatitis Of The Scalp     Itching (Pruritus)     Peripheral Neuropathy     Asymptomatic Coronary Arteriosclerosis     Hypermagnesemia     Restless Legs Syndrome     Hypertension     Spinal Stenosis     Epistaxis     Cerumen Impaction     Vertigo     Cough     Patellofemoral Syndrome     Onychomycosis Of The Toenails     Chronic cough     Acute bronchitis     Skin tag     Gastroesophageal reflux disease without esophagitis     Pseudotumor (inflammatory) of orbit, right     Right flank pain, chronic     Hammertoe of second toe of right foot       Past Medical History:   Diagnosis Date     Asymptomatic Coronary Arteriosclerosis      Cerumen Impaction      Chronic Constipation      Epistaxis      Gastroesophageal reflux disease without esophagitis 2/18/2016     Hypertension      Osteoarthritis Of The Knee      Osteoporosis      Patellofemoral Syndrome      Peripheral Neuropathy      Pseudotumor (inflammatory) of orbit, right  "2/21/2017     Restless Legs Syndrome      Seborrheic Dermatitis      Spinal Stenosis      Vertigo        Past Surgical History:   Procedure Laterality Date     ABDOMINAL SURGERY       APPENDECTOMY       BACK SURGERY       CHOLECYSTECTOMY       COSMETIC SURGERY       EYE SURGERY       FRACTURE SURGERY       LUMBAR FUSION  08/2013     MN DILATION/CURETTAGE,DIAGNOSTIC      Description: Dilation And Curettage;  Recorded: 02/02/2010;  Comments: due to miscarriage     MN KNEE SCOPE,DIAGNOSTIC      Description: Arthroscopy Knee Right;  Recorded: 02/02/2010;  Comments: right knee - arthroscopy for debridement; then Synvisc     MN REMOVAL GALLBLADDER      Description: Cholecystectomy;  Recorded: 02/02/2010;  Comments: age 30s       Social History     Social History     Marital status:      Spouse name: N/A     Number of children: N/A     Years of education: N/A     Occupational History     Not on file.     Social History Main Topics     Smoking status: Never Smoker     Smokeless tobacco: Never Used     Alcohol use No     Drug use: No     Sexual activity: Not on file     Other Topics Concern     Not on file     Social History Narrative       Patient Care Team:  Emmanuelle Woodall MD as PCP - General          Objective:     Vitals:    08/06/18 1448   BP: 128/76   Pulse: 92   Resp: 20   Temp: 98.4  F (36.9  C)   TempSrc: Oral   Weight: 150 lb 14.4 oz (68.4 kg)   Height: 5' 3\" (1.6 m)         Physical Exam:  EXAM:  /76 (Patient Site: Right Arm, Patient Position: Sitting, Cuff Size: Adult Regular)  Pulse 92  Temp 98.4  F (36.9  C) (Oral)   Resp 20  Ht 5' 3\" (1.6 m)  Wt 150 lb 14.4 oz (68.4 kg)  BMI 26.73 kg/m2   Gen:  NAD, appears well, well-hydrated  HEENT:  TMs nl, oropharynx benign, nasal mucosa nl, conjunctiva clear  Neck:  Supple, no adenopathy, no thyromegaly, no carotid bruits, no JVD  Lungs:  Clear to auscultation bilaterally  Cor:  RRR no murmur  Abd:  Soft, nontender, BS+, no masses, no " guarding or rebound, no HSM  Extr:  Neg., right 2nd toe is swollen/deformed/hammertoe with callus at tip of toe  Neuro:  No asymmetry, Nl motor tone/strength, nl sensation, reflexes =, gait nl, nl coordination, CN intact,   Skin:  Warm/dry          There are no Patient Instructions on file for this visit.        Labs:    [id number]  NSR rate 79  No acute ST-T changes      Xr Chest 2 Views    Result Date: 2018  XR CHEST 2 VIEWS 2018 4:19 PM INDICATION: Encounter for other preprocedural examination COMPARISON: 10/05/2015 FINDINGS: Chronic elevation of the right hemidiaphragm is again noted. The lungs are clear. There is no pleural effusion or pneumothorax. Heart size is stable. The limited visualized portions of the upper abdomen are grossly normal.      Immunization History   Administered Date(s) Administered     DT (pediatric) 2001     Influenza [id number], 2010     Influenza high dose, seasonal 2015, 2017     Influenza, inj, historic,unspecified 10/23/2007, 10/29/2008, 2016     Influenza, seasonal,quad inj 6-35 mos 2009, 2010, 10/14/2011, 10/17/2012, 2013     Pneumo Conj 13-V (&after) 2016     Pneumo Polysac 23-V 2006     Td,adult,historic,unspecified 2007     Tdap 2007, 2017     ZOSTER, LIVE 2012           Electronically signed by Emmanuelle Woodall MD 18 2:51 PM

## 2021-06-24 ENCOUNTER — HOSPITAL ENCOUNTER (OUTPATIENT)
Dept: NUCLEAR MEDICINE | Facility: HOSPITAL | Age: 81
Discharge: HOME | End: 2021-06-24
Attending: INTERNAL MEDICINE

## 2021-06-24 ENCOUNTER — HOSPITAL ENCOUNTER (OUTPATIENT)
Dept: CARDIOLOGY | Facility: HOSPITAL | Age: 81
Discharge: HOME | End: 2021-06-24
Attending: INTERNAL MEDICINE

## 2021-06-24 LAB
CV STRESS MAX HR HE: 117
NUC REST EJECTION FRACTION: 75 %
RATE PRESSURE PRODUCT: NORMAL
STRESS ECHO BASELINE DIASTOLIC HE: 98
STRESS ECHO BASELINE HR: 110 BPM
STRESS ECHO BASELINE SYSTOLIC BP: 174
STRESS ECHO CALCULATED PERCENT HR: 84 %
STRESS ECHO LAST STRESS DIASTOLIC BP: 74
STRESS ECHO LAST STRESS SYSTOLIC BP: 154
STRESS ECHO TARGET HR: 140

## 2021-06-24 NOTE — TELEPHONE ENCOUNTER
Refill Approved  Refill given per policy.  A 90 day refill was sent to the patients pharmacy. Pt is overdue for labs  Rx renewed per Medication Renewal Policy. Medication was last renewed on 9/11/18.    Estrella Roman, [facility] Triage/Med Refill 2/18/2019     Requested Prescriptions   Pending Prescriptions Disp Refills     hydroCHLOROthiazide (HYDRODIURIL) 25 MG tablet [Pharmacy Med Name: HYDROCHLOROTHIAZIDE 25 MG Tablet] 90 tablet 1     Sig: TAKE 1 TABLET EVERY DAY    Diuretics/Combination Diuretics Refill Protocol  Failed - 2/14/2019  2:07 PM       Failed - Serum Potassium in past 12 months     No results found for: LN-POTASSIUM         Failed - Serum Sodium in past 12 months     No results found for: LN-SODIUM         Failed - Serum Creatinine in past 12 months     Creatinine   Date Value Ref Range Status   08/09/2017 0.86 0.60 - 1.10 mg/dL Final            Passed - Visit with PCP or prescribing provider visit in past 12 months    Last office visit with prescriber/PCP: 7/25/2018 Emmanuelle Woodall MD OR same dept: 7/25/2018 Emmanuelle Woodall MD OR same specialty: 7/25/2018 Emmanuelle Woodall MD  Last physical: 8/6/2018 Last MTM visit: Visit date not found   Next visit within 3 mo: Visit date not found  Next physical within 3 mo: Visit date not found  Prescriber OR PCP: Emmanuelle Woodall MD  Last diagnosis associated with med order: 1. Essential hypertension  - hydroCHLOROthiazide (HYDRODIURIL) 25 MG tablet [Pharmacy Med Name: HYDROCHLOROTHIAZIDE 25 MG Tablet]; TAKE 1 TABLET EVERY DAY  Dispense: 90 tablet; Refill: 1    If protocol passes may refill for 12 months if within 3 months of last provider visit (or a total of 15 months).            Passed - Blood pressure on file in past 12 months    BP Readings from Last 1 Encounters:   08/08/18 167/77

## 2021-06-26 ENCOUNTER — HEALTH MAINTENANCE LETTER (OUTPATIENT)
Age: 81
End: 2021-06-26

## 2021-06-30 NOTE — PROGRESS NOTES
Progress Notes by Emmanuelle Woodall MD at 2/23/2021  4:40 PM     Author: Emmanuelle Woodall MD Service: -- Author Type: Physician    Filed: 2/28/2021  6:07 PM Encounter Date: 2/23/2021 Status: Signed    : Emmanuelle Woodall MD (Physician)       ASSESSMENT/PLAN:  1. Impingement syndrome of shoulder region, right         This is an 82 yo female with right shoulder pain - no injury.  Pain extends from lateral right shoulder (subacromial) and into deltoid//bicep.  Strength is limited by pain.  ROM generally normal, no grinding in shoulder.  Some pain with tenderness at suprascapular region.  Discussed conservative therapy to shoulder region.  If no improvement, then will need follow up.     Of note, patient had [facility] screening done recently and brings in results for my review.  She is most concerned by her cholesterol numbers - doesn't want medications but wants to know about triglycerides.  Education provided for her.     Return in about 4 weeks (around 3/23/2021) for if not getting better.      There are no discontinued medications.  Patient Instructions     Patient Education     Understanding Your Cholesterol Numbers     Blood flows easily when arteries are clear.      Less blood flows when cholesterol builds up in artery walls.   The higher your blood cholesterol, the greater your risk for heart attack, cardiovascular disease, or stroke. High cholesterol can cause any artery in your body to become narrow. Thats why you need to know your cholesterol level. If its high, you can take steps to bring it down. Eating the right foods and getting enough exercise can help. Some people also need medicine to control their cholesterol. Your healthcare provider can help you identify your personal risk through screenings tests and a discussion about different factors that many increase your chance for heart disease and stroke. These include family history, age, gender, ethnicity, and  "current health. Your healthcare provider can help you get started on a plan to control your cholesterol.  Checking your cholesterol  Your cholesterol is checked with a simple blood test. The results tell you how much cholesterol you have in your blood. Get checked as often as your healthcare provider suggests. Start monitoring your cholesterol levels regularly after age 20. Check it earlier if you have an increased risk for either high cholesterol or heart disease. If you have diabetes or high cholesterol, you may need your blood tested as often as every 3 months. As you work to lower your cholesterol, your numbers will change slowly. But they will change. Be patient and stay on track. Discuss your numbers with your healthcare provider.   Your total cholesterol number  A blood test will give you a number for the total amount of cholesterol in your blood. The higher this number, the more likely it is that cholesterol will build up in your blood vessels. Even if your cholesterol is just slightly high, you are at increased risk for health problems.  My total cholesterol is: ________________  Your lipid numbers  Total cholesterol is just one part of the story. Cholesterol is made up of different kinds of fats (lipids). If your total cholesterol is high, knowing your lipid profile is important. The 2 most important lipids are HDL and LDL. Your healthcare provider will tell you if you should fast before having your lipids checked. Fasting means you dont eat for a certain amount of time before the test is done. Along with cholesterol, triglyceride can also lead to blocked arteries. Triglycerides are another type of fat. Knowing your HDL, LDL, and triglyceride numbers, as well as your total cholesterol gives you a more complete picture of your cholesterol level:    HDL is called the good cholesterol. It moves the \"bad\" cholesterol out of the bloodstream and does not block your blood vessels. HDL levels are affected by how " much you exercise and what you eat. This is the type of cholesterol that you don't want too little of. The higher the HDL, the better.My HDL cholesterol is: ________________    LDL is called the bad cholesterol. This is because it can stick to your artery walls and block blood flow. LDL levels are most affected by what you eat and by your genes. LDL cholesterol is clustered with other cholesterol types including apolipoprotein B (apoB) and lipoprotein a [Lp(a)]. Higher levels of these cholesterol types can increase your risk for atherosclerosis.My LDL cholesterol is: ________________    Triglyceride is a type of fat the body uses to store energy. Too much triglyceride can increase your risk for heart disease. Triglyceride levels should be under 150. If your triglyceride level is above 200 mg/dL your provider may want to look at another cholesterol type called apolipoprotein B (apoB). My triglyceride is:  ________________  High cholesterol is only one of the big risk factor for heart disease heart attack, stroke, and peripheral artery disease. If your cholesterol levels are higher than normal, your healthcare provider will help you with steps to take to lower your levels. Steps may include lifestyle changes such as diet, physical activity, and quitting smoking. Your provider may also prescribe medicine to lower bad cholesterol levels. Based on your other health issues and risk factors, your healthcare provider may have specific goals for treating your cholesterol. You may need to use different kinds of medicines that work on the different types of cholesterol.   Some people may need to take medicines called statins to control their cholesterol. This is in addition to eating a healthy diet and getting regular exercise. Statins can help you stay healthy. They can also help prevent a heart attack or stroke. Also ask your provider about any side effects your medicines may cause. Let your provider know about any side  "effects you have. Make a plan to have regular cholesterol checks.   Work with your provider to know and understand what your cholesterol numbers mean, what your risk factors are and what are your treatment options and goals. Make sure you understand why these goals are important, based on your own health history and your family history of heart disease or high cholesterol. Stick with your treatment plan to reach the goals you set.  Date Last Reviewed: 10/1/2016    0781-5600 [employer] [address] [place] [address], [place] [address]. All rights reserved. This information is not intended as a substitute for professional medical care. Always follow your healthcare professional's instructions.         Patient Education     Triglycerides  Does this test have other names?  Lipid panel, fasting lipoprotein panel  What is this test?  This test measures the amount of triglycerides in your blood.  Triglycerides are one of several types of fats in your blood. Other kinds are LDL (\"bad\") cholesterol and HDL (\"good\") cholesterol.  Knowing your triglyceride level is important, especially if you have diabetes, are overweight or a smoker, or are mostly inactive. High triglyceride levels may put you at greater risk for a heart attack or stroke.    This test is part of a group of cholesterol and blood fat tests called a fasting lipoprotein panel, or lipid panel. This panel is recommended for all adults at least once every 5 years, or as recommended by your healthcare provider.  Knowing your triglyceride level helps your healthcare provider suggest healthy changes to your diet or lifestyle. If you have triglycerides that are high to very high, your provider is more likely to prescribe medicines to lower your triglycerides or your LDL cholesterol.  Why do I need this test?  You may need this test as part of a routine checkup. You may also need this test if you're overweight, drink too much alcohol, rarely exercise, or have " other conditions like high blood pressure or diabetes.  If you are on cholesterol-lowering medicines, you may have this test to see how well your treatment is working.  What other tests might I have along with this test?  Your healthcare provider will order screening tests for LDL, HDL, and total cholesterol.  What do my test results mean?  Test results may vary depending on your age, gender, health history, the method used for the test, and other things. Your test results may not mean you have a problem. Ask your healthcare provider what your test results mean for you.   Results are given in milligrams per deciliter (mg/dL). Normal levels of triglycerides are less than 150 mg/dL.  Here are how higher numbers are classified:    150 to 199 mg/dL: Borderline high    200 to 499 mg/dL: High    500 mg/dL and above: Very high  If you have a high triglyceride level, you have a greater risk for heart attack and stroke.  A triglyceride level above 150 mg/dL also means that you may have an increased risk for metabolic syndrome. This is a cluster of symptoms including high blood pressure, high blood sugar, and high body fat around the waist. These symptoms have been linked to increased risk for diabetes, heart disease, and stroke.  High triglycerides levels can also be caused by certain diseases or inherited conditions.  If your triglyceride level is above 200 mg/dL, your healthcare provider may recommend that you:    Lose weight    Limit high-fat foods containing saturated fats. These are animal fats found in meat, butter, and whole milk.    Limit trans fats, which are found in many processed foods like chips and store-bought cookies    Cut back on drinks with added sugars, such as soda    Limit your alcohol intake    Stop smoking    Control your blood pressure    Exercise for at least 30 minutes a day, 5 days a week    Limit the calories from fat in your diet to 25% to 35% of your total intake  If your triglycerides are  extremely high--above 500 mg/dL--you may have an added risk for problems with your pancreas. You will likely need medicine to lower your levels along with recommended changes in diet and lifestyle.  How is this test done?  The test is done with a blood sample. A needle is used to draw blood from a vein in your arm or hand.   Does this test pose any risks?  Having a blood test with a needle carries some risks. These include bleeding, infection, bruising, and feeling lightheaded. When the needle pricks your arm or hand, you may feel a slight sting or pain. Afterward, the site may be sore.   What might affect my test results?  Not fasting for the required length of time before the test can affect your results. Certain medicines can affect your results, as can drinking alcohol.  How do I prepare for the test?  If you have this test as part of a cholesterol screening, you will need to not eat or drink anything but water for 9 to 14 hours before the test. Be sure your healthcare provider knows about all medicines, herbs, vitamins, and supplements you are taking. This includes medicines that don't need a prescription and any illicit drugs you may use.    5401-2021 [employer]. [address] [place] [address]. All rights reserved. This information is not intended as a substitute for professional medical care. Always follow your healthcare professional's instructions.               Chief Complaint:  Chief Complaint   Patient presents with   ? Arm Pain     right arm pain for 2 weeks, on and off pain   ? Shoulder Pain     right shoulder pain for 2 weeks, on and off getting worse       HPI:   Deborah AZUL Aline is a 81 y.o. female c/o  Right shoulder pain/arm pain -   Comes and goes -   No injury   Nothing different   At least 2 weeks - maybe longer  Mostly hot packs; doesn't like to take medicine - takes Advil -   Katie Pathak works immediately for relief    [facility] screening - worried about her  triglycerides, prediabetes, carotid artery screening   Discussed exercise     PMH:   Patient Active Problem List    Diagnosis Date Noted   ? Bunion, right 09/08/2020   ? Bilateral hearing loss 04/07/2019   ? Tinnitus, bilateral 04/07/2019   ? Hammertoe of second toe of right foot 07/25/2018   ? Right flank pain, chronic 11/03/2017   ? 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 Gastroesophageal reflux disease without esophagitis 02/18/2016   ? Skin tag 12/25/2015   ? Acute bronchitis 09/14/2015   ? Chronic cough 01/05/2015   ? Onychomycosis Of The Toenails    ? Chronic Constipation    ? Osteoarthritis Of The Knee    ? Osteoporosis    ? Atypical Chest Pain    ? Seborrheic Dermatitis    ? Seborrheic Dermatitis Of The Scalp    ? Itching (Pruritus)    ? Peripheral Neuropathy    ? Asymptomatic Coronary Arteriosclerosis    ? Hypermagnesemia    ? Restless Legs Syndrome    ? Hypertension    ? Spinal Stenosis    ? Epistaxis    ? Cerumen Impaction    ? Vertigo    ? Cough    ? Patellofemoral Syndrome      Past Medical History:   Diagnosis Date   ? Asymptomatic Coronary Arteriosclerosis    ? Cerumen Impaction    ? Chronic Constipation    ? Epistaxis    ? Gastroesophageal reflux disease without esophagitis 2/18/2016   ? Hypertension    ? Osteoarthritis Of The Knee    ? Osteoporosis    ? Patellofemoral Syndrome    ? Peripheral Neuropathy    ? 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 Restless Legs Syndrome    ? Seborrheic Dermatitis    ? Spinal Stenosis    ? Vertigo      Past Surgical History:   Procedure Laterality Date   ? ABDOMINAL SURGERY     ? APPENDECTOMY     ? BACK SURGERY     ? CHOLECYSTECTOMY     ? COSMETIC SURGERY     ? EYE SURGERY     ? FRACTURE SURGERY     ? LUMBAR FUSION  08/2013   ? IN DILATION/CURETTAGE,DIAGNOSTIC      Description: Dilation And Curettage;  Recorded: 02/02/2010;  Comments: due to miscarriage   ? IN KNEE SCOPE,DIAGNOSTIC      Description: Arthroscopy Knee Right;   Recorded: 02/02/2010;  Comments: right knee - arthroscopy for debridement; then Synvisc   ? PA REMOVAL GALLBLADDER      Description: Cholecystectomy;  Recorded: 02/02/2010;  Comments: age 30s     Social History     Socioeconomic History   ? Marital status:      Spouse name: Not on file   ? Number of children: Not on file   ? Years of education: Not on file   ? Highest education level: Not on file   Occupational History   ? Not on file   Social Needs   ? Financial resource strain: Not on file   ? Food insecurity     Worry: Not on file     Inability: Not on file   ? Transportation needs     Medical: Not on file     Non-medical: Not on file   Tobacco Use   ? Smoking status: Never Smoker   ? Smokeless tobacco: Never Used   Substance and Sexual Activity   ? Alcohol use: No   ? Drug use: No   ? Sexual activity: Not on file   Lifestyle   ? Physical activity     Days per week: Not on file     Minutes per session: Not on file   ? Stress: Not on file   Relationships   ? Social connections     Talks on phone: Not on file     Gets together: Not on file     Attends [employer] service: Not on file     Active member of club or organization: Not on file     Attends meetings of clubs or organizations: Not on file     Relationship status: Not on file   ? Intimate partner violence     Fear of current or ex partner: Not on file     Emotionally abused: Not on file     Physically abused: Not on file     Forced sexual activity: Not on file   Other Topics Concern   ? Not on file   Social History Narrative   ? Not on file     History reviewed. No pertinent family history.    Meds:    Current Outpatient Medications:   ?  aspirin 81 mg chewable tablet, Chew 81 mg daily., Disp: , Rfl:   ?  calcium carbonate-vitamin D3 (CALCIUM 600 + D,3,) 600 mg(1,500mg) -200 unit per tablet, Take 1 tablet by mouth 2 (two) times a day., Disp: , Rfl:   ?  gabapentin (NEURONTIN) 300 MG capsule, TAKE 2 CAPSULES THREE TIMES DAILY, Disp: 540 capsule, Rfl:  3  ?  glucos-msm-C-Mn-herb#21 (GLUCOSAMINE-MSM COMPLEX) Tab, Take 1 tablet by mouth 2 (two) times a day., Disp: , Rfl:   ?  hydroCHLOROthiazide (HYDRODIURIL) 25 MG tablet, TAKE 1 TABLET EVERY DAY, Disp: 90 tablet, Rfl: 2  ?  MULTIVITAMIN (MULTIPLE VITAMIN ORAL), Take 1 tablet by mouth daily., Disp: , Rfl:   ?  nortriptyline (PAMELOR) 75 MG capsule, TAKE 1 CAPSULE AT BEDTIME, Disp: 90 capsule, Rfl: 2    Allergies:  No Known Allergies    ROS:  Pertinent positives as noted in HPI; otherwise 12 point ROS negative.      Physical Exam:  EXAM:  /79 (Patient Site: Right Arm, Patient Position: Sitting, Cuff Size: Adult Regular)   Pulse 94   Resp 20   Wt 151 lb (68.5 kg)   BMI 26.75 kg/m     Gen:  NAD, appears well, well-hydrated  HEENT:  TMs nl, oropharynx benign, nasal mucosa nl, conjunctiva clear  Neck:  Supple, no adenopathy, no thyromegaly, no carotid bruits, no JVD  Lungs:  Clear to auscultation bilaterally  Cor:  RRR no murmur  Abd:  Soft, nontender, BS+, no masses, no guarding or rebound, no HSM  Extr:  Generally nl ROM right shoulder - limited by pain - tender to palpation at subacromial space as well as into deltoid/biceps - muscle spasm//tenderness at suprascapular muscles  Neuro:  No asymmetry  Skin:  Warm/dry        Results:  Results for orders placed or performed in visit on 11/09/20   Potassium   Result Value Ref Range    Potassium 3.9 3.5 - 5.0 mmol/L   Hemoglobin   Result Value Ref Range    Hemoglobin 13.2 12.0 - 16.0 g/dL   Electrocardiogram Perform and Read   Result Value Ref Range    SYSTOLIC BLOOD PRESSURE      DIASTOLIC BLOOD PRESSURE      VENTRICULAR RATE 90 BPM    ATRIAL RATE 90 BPM    P-R INTERVAL 216 ms    QRS DURATION 82 ms    Q-T INTERVAL 378 ms    QTC CALCULATION (BEZET) 462 ms    P Axis 78 degrees    R AXIS 2 degrees    T AXIS 71 degrees    MUSE DIAGNOSIS       Sinus rhythm with 1st degree A-V block  Possible Left atrial enlargement  Borderline ECG  No previous ECGs available  Confirmed  by ANIRUDH FORMAN, LES LOC:[facility] ([id number]) on 11/9/2020 3:42:25 PM

## 2021-07-04 NOTE — PROGRESS NOTES
Progress Notes by Dejah Sherman MD at 6/17/2021  3:30 PM     Author: Dejah Sherman MD Service: -- Author Type: Physician    Filed: 6/21/2021  1:06 PM Encounter Date: 6/17/2021 Status: Signed    : Dejah Sherman MD (Physician)           Thank you, Dr. Woodall, for asking us to see Deborah Mireles at the [facility].      Assessment/Recommendations   Assessment:    1.  Minimal nonobstructive coronary artery disease on CTA in 2012  2.  Family history of heart disease  3.  Hypertension  4.  Dyspnea on exertion    Plan:  1.  Lexiscan nuclear stress test  2.  Continue aspirin, due for repeat fasting lipids  3.  Follow-up depending on results of testing       History of Present Illness    Ms. Deborah Mireles is a 81 y.o. female with history of hypertension, mild carotid artery disease, mild nonobstructive coronary artery stenosis on CTA done in 2012 with a calcium score of 31, family history of premature coronary disease who I am seeing today for initial consultation.  She has not been as active lately due to pain in her right foot.  She is concerned about her family history.  She has not noted any chest pain but has noted progressive decreased exercise tolerance with shortness of breath with exertion.    Mild EKG done eleven 9/21 and reviewed shows normal sinus rhythm with first-degree AV block       Physical Examination Review of Systems   Vitals:    06/17/21 1534   BP: 150/84   Pulse: 96   Resp: 16     Body mass index is 26.75 kg/m .  Wt Readings from Last 3 Encounters:   06/17/21 151 lb (68.5 kg)   05/11/21 153 lb (69.4 kg)   02/23/21 151 lb (68.5 kg)       General Appearance:   alert, no apparent distress   HEENT:  no scleral icterus; the mucous membranes are pink and moist                                  Neck: No jvd   Chest: the spine is straight and the chest is symmetric   Lungs:   respirations unlabored; the lungs are clear to  auscultation   Cardiovascular:   regular rhythm with normal first and second heart sounds and no murmurs or gallops   Abdomen:  no organomegaly, masses, bruits, or tenderness; bowel sounds are present   Extremities: no edema   Skin: no xanthelasma    General: WNL  Eyes: WNL  Ears/Nose/Throat: WNL  Lungs: WNL  Heart: WNL  Stomach: WNL  Bladder: WNL  Muscle/Joints: WNL  Skin: WNL  Nervous System: WNL  Mental Health: WNL     Blood: WNL     Medical History  Surgical History Family History Social History   Past Medical History:   Diagnosis Date   ? Asymptomatic Coronary Arteriosclerosis    ? Cerumen Impaction    ? Chronic Constipation    ? Epistaxis    ? Gastroesophageal reflux disease without esophagitis 2/18/2016   ? Hypertension    ? Osteoarthritis Of The Knee    ? Osteoporosis    ? Patellofemoral Syndrome    ? Peripheral Neuropathy    ? 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 Restless Legs Syndrome    ? Seborrheic Dermatitis    ? Spinal Stenosis    ? Vertigo      Mother history of CVA in older age, father history of MI at age 67, brother MI in 60s Social History     Socioeconomic History   ? Marital status:      Spouse name: Not on file   ? Number of children: Not on file   ? Years of education: Not on file   ? Highest education level: Not on file   Occupational History   ? Not on file   Social Needs   ? Financial resource strain: Not on file   ? Food insecurity     Worry: Not on file     Inability: Not on file   ? Transportation needs     Medical: Not on file     Non-medical: Not on file   Tobacco Use   ? Smoking status: Never Smoker   ? Smokeless tobacco: Never Used   Substance and Sexual Activity   ? Alcohol use: No   ? Drug use: No   ? Sexual activity: Not on file   Lifestyle   ? Physical activity     Days per week: Not on file     Minutes per session: Not on file   ? Stress: Not on file   Relationships   ? Social connections     Talks on phone: Not on file     Gets together: Not on file      Attends [employer]ism service: Not on file     Active member of club or organization: Not on file     Attends meetings of clubs or organizations: Not on file     Relationship status: Not on file   ? Intimate partner violence     Fear of current or ex partner: Not on file     Emotionally abused: Not on file     Physically abused: Not on file     Forced sexual activity: Not on file   Other Topics Concern   ? Not on file   Social History Narrative   ? Not on file          Medications  Allergies   Current Outpatient Medications   Medication Sig Dispense Refill   ? calcium carbonate-vitamin D3 (CALCIUM 600 + D,3,) 600 mg(1,500mg) -200 unit per tablet Take 1 tablet by mouth 2 (two) times a day.  0   ? gabapentin (NEURONTIN) 300 MG capsule TAKE 2 CAPSULES THREE TIMES DAILY 540 capsule 3   ? glucosamn-msm-C-shady-herbal 21 (GLUCOSAMINE-MSM COMPLEX) Tab Take 1 tablet by mouth 2 (two) times a day.  0   ? hydroCHLOROthiazide (HYDRODIURIL) 25 MG tablet Take 1 tablet (25 mg total) by mouth daily. 90 tablet 2   ? MULTIVITAMIN (MULTIPLE VITAMIN ORAL) Take 1 tablet by mouth daily.     ? nortriptyline (PAMELOR) 75 MG capsule Take 1 capsule (75 mg total) by mouth at bedtime. 90 capsule 2     No current facility-administered medications for this visit.       No Known Allergies      Lab Results    Chemistry/lipid CBC Cardiac Enzymes/BNP/TSH/INR   Lab Results   Component Value Date    CHOL 166 09/08/2020    HDL 48 (L) 09/08/2020    LDLCALC 83 09/08/2020    TRIG 173 (H) 09/08/2020    CREATININE 0.80 09/08/2020    BUN 9 09/08/2020    K 3.9 11/09/2020     09/08/2020     09/08/2020    CO2 29 09/08/2020    Lab Results   Component Value Date    WBC 6.7 09/08/2020    HGB 13.2 11/09/2020    HCT 42.4 09/08/2020    MCV 95 09/08/2020     09/08/2020    Lab Results   Component Value Date    TSH 3.13 09/04/2019    INR 0.97 09/16/2013

## 2021-07-06 VITALS
SYSTOLIC BLOOD PRESSURE: 150 MMHG | HEIGHT: 63 IN | DIASTOLIC BLOOD PRESSURE: 84 MMHG | WEIGHT: 151 LBS | BODY MASS INDEX: 26.75 KG/M2 | HEART RATE: 96 BPM | RESPIRATION RATE: 16 BRPM

## 2021-07-13 ENCOUNTER — RECORDS - HEALTHEAST (OUTPATIENT)
Dept: ADMINISTRATIVE | Facility: CLINIC | Age: 81
End: 2021-07-13

## 2021-07-21 ENCOUNTER — RECORDS - HEALTHEAST (OUTPATIENT)
Dept: ADMINISTRATIVE | Facility: CLINIC | Age: 81
End: 2021-07-21

## 2021-09-24 ENCOUNTER — OFFICE VISIT (OUTPATIENT)
Dept: FAMILY MEDICINE | Facility: CLINIC | Age: 81
End: 2021-09-24
Payer: COMMERCIAL

## 2021-09-24 VITALS
RESPIRATION RATE: 16 BRPM | TEMPERATURE: 98.4 F | HEART RATE: 59 BPM | WEIGHT: 149 LBS | DIASTOLIC BLOOD PRESSURE: 76 MMHG | BODY MASS INDEX: 26.39 KG/M2 | SYSTOLIC BLOOD PRESSURE: 125 MMHG

## 2021-09-24 DIAGNOSIS — Z01.818 PREOP EXAMINATION: Primary | ICD-10-CM

## 2021-09-24 DIAGNOSIS — M16.11 PRIMARY OSTEOARTHRITIS OF RIGHT HIP: ICD-10-CM

## 2021-09-24 DIAGNOSIS — G25.81 RESTLESS LEGS SYNDROME (RLS): ICD-10-CM

## 2021-09-24 DIAGNOSIS — I10 ESSENTIAL HYPERTENSION: ICD-10-CM

## 2021-09-24 LAB
ANION GAP SERPL CALCULATED.3IONS-SCNC: 15 MMOL/L (ref 5–18)
ATRIAL RATE - MUSE: 88 BPM
BUN SERPL-MCNC: 12 MG/DL (ref 8–28)
CALCIUM SERPL-MCNC: 10 MG/DL (ref 8.5–10.5)
CHLORIDE BLD-SCNC: 101 MMOL/L (ref 98–107)
CO2 SERPL-SCNC: 25 MMOL/L (ref 22–31)
CREAT SERPL-MCNC: 0.87 MG/DL (ref 0.6–1.1)
DIASTOLIC BLOOD PRESSURE - MUSE: NORMAL MMHG
GFR SERPL CREATININE-BSD FRML MDRD: 63 ML/MIN/1.73M2
GLUCOSE BLD-MCNC: 103 MG/DL (ref 70–125)
HGB BLD-MCNC: 14.5 G/DL (ref 11.7–15.7)
INTERPRETATION ECG - MUSE: NORMAL
P AXIS - MUSE: 103 DEGREES
POTASSIUM BLD-SCNC: 3.7 MMOL/L (ref 3.5–5)
PR INTERVAL - MUSE: 208 MS
QRS DURATION - MUSE: 88 MS
QT - MUSE: 380 MS
QTC - MUSE: 459 MS
R AXIS - MUSE: 185 DEGREES
SODIUM SERPL-SCNC: 141 MMOL/L (ref 136–145)
SYSTOLIC BLOOD PRESSURE - MUSE: NORMAL MMHG
T AXIS - MUSE: 117 DEGREES
VENTRICULAR RATE- MUSE: 88 BPM

## 2021-09-24 PROCEDURE — 99214 OFFICE O/P EST MOD 30 MIN: CPT | Performed by: FAMILY MEDICINE

## 2021-09-24 PROCEDURE — 36415 COLL VENOUS BLD VENIPUNCTURE: CPT | Performed by: FAMILY MEDICINE

## 2021-09-24 PROCEDURE — 80048 BASIC METABOLIC PNL TOTAL CA: CPT | Performed by: FAMILY MEDICINE

## 2021-09-24 PROCEDURE — 93010 ELECTROCARDIOGRAM REPORT: CPT | Performed by: INTERNAL MEDICINE

## 2021-09-24 PROCEDURE — 93005 ELECTROCARDIOGRAM TRACING: CPT | Performed by: FAMILY MEDICINE

## 2021-09-24 PROCEDURE — 85018 HEMOGLOBIN: CPT | Performed by: FAMILY MEDICINE

## 2021-09-24 NOTE — PROGRESS NOTES
"[facility] [address] [place] [address]  Phone: [phone]  Fax: [phone]  Primary Provider: Emmanuelle Woodall  Pre-op Performing Provider: EMMANUELLE WOODALL      PREOPERATIVE EVALUATION:  Today's date: 9/24/2021    Deborah Mireles is a 81 year old female who presents for a preoperative evaluation.    Surgical Information:  Surgery/Procedure: Right Hip replacement   Surgery Location: [facility]  Surgeon: Dr. FRAGA  Surgery Date: 9-30-21  Time of Surgery: 6:00am  Where patient plans to recover: Other: Hotel next door with nursing  Fax number for surgical facility:     Type of Anesthesia Anticipated: to be determined    Assessment & Plan     The proposed surgical procedure is considered INTERMEDIATE risk.    Problem List Items Addressed This Visit        Circulatory    Hypertension       Musculoskeletal and Integumentary    Restless Legs Syndrome      Other Visit Diagnoses     Preop examination    -  Primary    Relevant Orders    EKG 12-lead, tracing only (Completed)    XR Chest 2 Views (Completed)    Basic metabolic panel  (Ca, Cl, CO2, Creat, Gluc, K, Na, BUN) (Completed)    Hemoglobin (Completed)    Asymptomatic COVID-19 Virus (Coronavirus) by PCR    Primary osteoarthritis of right hip                   Risks and Recommendations:  The patient has the following additional risks and recommendations for perioperative complications:   - No identified additional risk factors other than previously addressed    Medication Instructions:  Patient is to take all scheduled medications on the day of surgery    RECOMMENDATION:  APPROVAL GIVEN to proceed with proposed procedure, without further diagnostic evaluation.    Review of external notes as documented above       Subjective     HPI related to upcoming procedure: painful \"bone on bone\" right hip - has no motion laterally or twisting      Preop Questions 9/24/2021   1. Have you ever had a heart " attack or stroke? No   2. Have you ever had surgery on your heart or blood vessels, such as a stent placement, a coronary artery bypass, or surgery on an artery in your head, neck, heart, or legs? No   3. Do you have chest pain with activity? No   4. Do you have a history of  heart failure? No   5. Do you currently have a cold, bronchitis or symptoms of other infection? No   6. Do you have a cough, shortness of breath, or wheezing? No   7. Do you or anyone in your family have previous history of blood clots? YES - one brother with blood clot      8. Do you or does anyone in your family have a serious bleeding problem such as prolonged bleeding following surgeries or cuts? No   9. Have you ever had problems with anemia or been told to take iron pills? No   10. Have you had any abnormal blood loss such as black, tarry or bloody stools, or abnormal vaginal bleeding? No   11. Have you ever had a blood transfusion? No   12. Are you willing to have a blood transfusion if it is medically needed before, during, or after your surgery? Yes   13. Have you or any of your relatives ever had problems with anesthesia? No   14. Do you have sleep apnea, excessive snoring or daytime drowsiness? No   15. Do you have any artifical heart valves or other implanted medical devices like a pacemaker, defibrillator, or continuous glucose monitor? No   16. Do you have artificial joints? No   17. Are you allergic to latex? No       Health Care Directive:  Patient does not have a Health Care Directive or Living Will: Patient states has Advance Directive and will bring in a copy to clinic.  Recommended bringing to surgery.      Preoperative Review of :    Fill Date ID Written Sold Drug Qty Days Prescriber Rx # Pharmacy Refill Daily Dose * Pymt Type    12/28/2020  1   06/29/2020 12/28/2020  Gabapentin 300 Mg Capsule  540.00 90 [address]  [id number]  [facility] ([id number])  2/3  Medicare  [place]   10/05/2020  1   06/29/2020  10/05/2020  Gabapentin 300 Mg  Capsule  540.00 90 [facility]  [id number]  [facility] ([id number])  1/3  Medicare  [place]           Review of Systems  CONSTITUTIONAL: NEGATIVE for fever, chills, change in weight  INTEGUMENTARY/SKIN: NEGATIVE for worrisome rashes, moles or lesions  EYES: NEGATIVE for vision changes or irritation  ENT/MOUTH: NEGATIVE for ear, mouth and throat problems  ENT/MOUTH: chronic dry mouth  RESP: NEGATIVE for significant cough or SOB  CV: NEGATIVE for chest pain, palpitations or peripheral edema  GI: NEGATIVE for nausea, abdominal pain, heartburn, or change in bowel habits  : NEGATIVE for frequency, dysuria, or hematuria  MUSCULOSKELETAL: NEGATIVE for significant arthralgias or myalgia  NEURO: NEGATIVE for weakness, dizziness or paresthesias  ENDOCRINE: NEGATIVE for temperature intolerance, skin/hair changes  HEME: NEGATIVE for bleeding problems  PSYCHIATRIC: NEGATIVE for changes in mood or affect    Patient Active Problem List    Diagnosis Date Noted     Bunion, right 09/08/2020     Priority: Medium     Bilateral hearing loss 04/07/2019     Priority: Medium     Tinnitus, bilateral 04/07/2019     Priority: Medium     Hammertoe of second toe of right foot 07/25/2018     Priority: Medium     Right flank pain, chronic 11/03/2017     Priority: Medium     Pseudotumor (inflammatory) of orbit, 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     Priority: Medium     Chronic Constipation      Priority: Medium     Created by Conversion  Replacement Utility updated for latest IMO load         Osteoarthritis Of The Knee      Priority: Medium     Created by Conversion  [facility] Annotation: Jan 31 2009  4:00PM - Emmanuelle Woodall:   s/p   Synvisc; s/p debridement  Replacement Utility updated for latest IMO load         Osteoporosis      Priority: Medium     Created by Conversion  Replacement Utility updated for latest IMO load         Itching (Pruritus)      Priority: Medium     Created by Conversion  Replacement Utility updated for latest IMO load         Peripheral  Neuropathy      Priority: Medium     Created by Conversion  [facility] Annotation: Jan 31 2009  4:00PM - Russel Husseinr: unclear etiology  Replacement Utility updated for latest IMO load         Asymptomatic Coronary Arteriosclerosis      Priority: Medium     Created by Conversion  Replacement Utility updated for latest IMO load         Hypermagnesemia      Priority: Medium     Created by Conversion  Replacement Utility updated for latest IMO load         Hypertension      Priority: Medium     Created by Conversion  Replacement Utility updated for latest IMO load         Gastroesophageal reflux disease without esophagitis 02/18/2016     Priority: Medium     Skin tag 12/25/2015     Priority: Medium     Acute bronchitis 09/14/2015     Priority: Medium     Chronic cough 01/05/2015     Priority: Medium     Restless Legs Syndrome      Priority: Medium     Created by Conversion         Onychomycosis Of The Toenails      Priority: Medium     Created by Conversion         Atypical Chest Pain      Priority: Medium     Created by Conversion         Seborrheic Dermatitis      Priority: Medium     Created by Conversion         Seborrheic Dermatitis Of The Scalp      Priority: Medium     Created by Conversion         Spinal Stenosis      Priority: Medium     Created by Conversion         Epistaxis      Priority: Medium     Created by Conversion         Cerumen Impaction      Priority: Medium     Created by Conversion         Vertigo      Priority: Medium     Created by Conversion         Cough      Priority: Medium     Created by Conversion         Patellofemoral Syndrome      Priority: Medium     Created by Conversion  [facility] Annotation: Jan 31 2009  4:00PM - Emmanuelle Woodall:   steroid injection per Dr. Mattson          Past Medical History:   Diagnosis Date     Constipation      Coronary atherosclerosis      Dizziness and giddiness      Epistaxis      Essential hypertension      Gastroesophageal reflux disease  without esophagitis 2/18/2016     Hereditary and idiopathic peripheral neuropathy      Impacted cerumen      Osteoarthrosis involving lower leg      Osteoporosis      Pain in joint, lower leg      Pseudotumor (inflammatory) of orbit, 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     Restless legs syndrome (RLS)      Seborrheic dermatitis, unspecified      Spinal stenosis, unspecified region other than cervical      Past Surgical History:   Procedure Laterality Date     ABDOMEN SURGERY       APPENDECTOMY       BACK SURGERY       CHOLECYSTECTOMY       COSMETIC SURGERY       EYE SURGERY       FRACTURE SURGERY       HC DILATION/CURETTAGE DIAG/THER NON OB      Description: Dilation And Curettage;  Recorded: 02/02/2010;  Comments: due to miscarriage     HC KNEE SCOPE, DIAGNOSTIC      Description: Arthroscopy Knee Right;  Recorded: 02/02/2010;  Comments: right knee - arthroscopy for debridement; then Synvisc     HC REMOVAL GALLBLADDER      Description: Cholecystectomy;  Recorded: 02/02/2010;  Comments: age 30s     LUMBAR FUSION  08/2013     Current Outpatient Medications   Medication Sig Dispense Refill     calcium carbonate-vitamin D3 (CALCIUM 600 + D,3,) 600 mg(1,500mg) -200 unit per tablet [CALCIUM CARBONATE-VITAMIN D3 (CALCIUM 600 + D,3,) 600 MG(1,500MG) -200 UNIT PER TABLET] Take 1 tablet by mouth 2 (two) times a day.  0     gabapentin (NEURONTIN) 300 MG capsule [GABAPENTIN (NEURONTIN) 300 MG CAPSULE] TAKE 2 CAPSULES THREE TIMES DAILY 540 capsule 3     glucosamn-msm-C-shady-herbal 21 (GLUCOSAMINE-MSM COMPLEX) Tab [GLUCOSAMN-MSM-C-SHADY-HERBAL 21 (GLUCOSAMINE-MSM COMPLEX) TAB] Take 1 tablet by mouth 2 (two) times a day.  0     hydroCHLOROthiazide (HYDRODIURIL) 25 MG tablet [HYDROCHLOROTHIAZIDE (HYDRODIURIL) 25 MG TABLET] Take 1 tablet (25 mg total) by mouth daily. 90 tablet 2     MULTIVITAMIN (MULTIPLE VITAMIN ORAL) [MULTIVITAMIN (MULTIPLE VITAMIN ORAL)] Take 1 tablet by mouth daily.       nortriptyline (PAMELOR) 75 MG capsule [NORTRIPTYLINE  (PAMELOR) 75 MG CAPSULE] Take 1 capsule (75 mg total) by mouth at bedtime. 90 capsule 2       No Known Allergies     Social History     Tobacco Use     Smoking status: Never Smoker     Smokeless tobacco: Never Used   Substance Use Topics     Alcohol use: No     No family history on file.  History   Drug Use No         Objective     /76 (BP Location: Right arm, Patient Position: Sitting, Cuff Size: Adult Regular)   Pulse 59   Temp 98.4  F (36.9  C) (Temporal)   Resp 16   Wt 67.6 kg (149 lb)   BMI 26.39 kg/m      Physical Exam  Constitutional:       General: She is not in acute distress.     Appearance: She is well-developed.   HENT:      Right Ear: Tympanic membrane and external ear normal.      Left Ear: Tympanic membrane and external ear normal.      Nose: Nose normal.      Mouth/Throat:      Mouth: Mucous membranes are dry.      Pharynx: No oropharyngeal exudate.   Eyes:      General:         Right eye: No discharge.         Left eye: No discharge.      Conjunctiva/sclera: Conjunctivae normal.      Pupils: Pupils are equal, round, and reactive to light.   Neck:      Thyroid: No thyromegaly.      Trachea: No tracheal deviation.   Cardiovascular:      Rate and Rhythm: Normal rate and regular rhythm.      Pulses: Normal pulses.      Heart sounds: Normal heart sounds, S1 normal and S2 normal. No murmur heard.   No friction rub. No S3 or S4 sounds.    Pulmonary:      Effort: Pulmonary effort is normal. No respiratory distress.      Breath sounds: Normal breath sounds. No wheezing or rales.   Abdominal:      General: Bowel sounds are normal.      Palpations: Abdomen is soft. There is no mass.      Tenderness: There is no abdominal tenderness.   Musculoskeletal:         General: Deformity (deformity at left foot/toes - hammertoe - s/p bunion surgery) present. Normal range of motion.      Cervical back: Neck supple.      Comments: Decreased ROM R>>L hip   Lymphadenopathy:      Cervical: No cervical adenopathy.    Skin:     General: Skin is warm and dry.      Findings: No rash.   Neurological:      Mental Status: She is alert and oriented to person, place, and time.      Motor: No abnormal muscle tone.      Deep Tendon Reflexes: Reflexes are normal and symmetric.   Psychiatric:         Thought Content: Thought content normal.         Judgment: Judgment normal.           Recent Labs   Lab Test 11/09/20  1153 09/08/20  1038   HGB 13.2 13.9   PLT  --  351   NA  --  140   POTASSIUM 3.9 3.9   CR  --  0.80        Diagnostics:    [facility]   Component Date Value Ref Range Status     Target HR 06/24/2021 140   Final     Baseline HR 06/24/2021 110  bpm Final     Baseline Systolic BP 06/24/2021 174   Final     Baseline Diastolic BP 06/24/2021 98   Final     Max HR 06/24/2021 117   Final     Calculated Percent HR 06/24/2021 84  % Final     Last Stress Systolic BP 06/24/2021 154   Final     Rate Pressure Product 06/24/2021 18,018.0   Final     Last Stress Diastolic BP 06/24/2021 74   Final     Nuc Rest EF 06/24/2021 75  % Final     XR Chest 2 Views    Result Date: 9/24/2021  EXAM: XR CHEST 2 VW LOCATION: [facility] DATE/TIME: 9/24/2021 12:02 PM INDICATION:  Preop examination COMPARISON: 08/06/2018     IMPRESSION: Negative chest with no significant change.      Revised Cardiac Risk Index (RCRI):  The patient has the following serious cardiovascular risks for perioperative complications:   - No serious cardiac risks = 0 points     RCRI Interpretation: 1 point: Class II (low risk - 0.9% complication rate)           Signed Electronically by: DANICA LARA MD  Copy of this evaluation report is provided to requesting physician.

## 2021-10-16 ENCOUNTER — HEALTH MAINTENANCE LETTER (OUTPATIENT)
Age: 81
End: 2021-10-16

## 2021-12-23 DIAGNOSIS — G60.9 HEREDITARY AND IDIOPATHIC PERIPHERAL NEUROPATHY: ICD-10-CM

## 2021-12-23 DIAGNOSIS — I10 ESSENTIAL HYPERTENSION: ICD-10-CM

## 2021-12-25 RX ORDER — HYDROCHLOROTHIAZIDE 25 MG/1
TABLET ORAL
Qty: 90 TABLET | Refills: 2 | Status: SHIPPED | OUTPATIENT
Start: 2021-12-25 | End: 2022-09-14

## 2021-12-25 RX ORDER — NORTRIPTYLINE HYDROCHLORIDE 75 MG/1
CAPSULE ORAL
Qty: 90 CAPSULE | Refills: 2 | Status: SHIPPED | OUTPATIENT
Start: 2021-12-25 | End: 2022-10-28

## 2021-12-26 NOTE — TELEPHONE ENCOUNTER
"Last Written Prescription Date:  3/26/2021  Last Fill Quantity: 90,  # refills: 2   Last office visit provider:  9/24/2021     Requested Prescriptions   Pending Prescriptions Disp Refills     nortriptyline (PAMELOR) 75 MG capsule [Pharmacy Med Name: NORTRIPTYLINE 75MG CAPSULES] 90 capsule 2     Sig: TAKE 1 CAPSULE BY MOUTH AT BEDTIME       Tricyclic Agents ( Annual appt and no PHQ9) Passed - 12/23/2021  8:54 AM        Passed - Blood Pressure under 140/90 in past 12 mos     BP Readings from Last 3 Encounters:   09/24/21 125/76   06/17/21 (!) 150/84   05/11/21 (!) 144/81                 Passed - Recent (12 mo) or future (30 days) visit within authorizing provider's specialty     Patient has had an office visit with the authorizing provider or a provider within the authorizing providers department within the previous 12 mos or has a future within next 30 days. See \"Patient Info\" tab in inbasket, or \"Choose Columns\" in Meds & Orders section of the refill encounter.              Passed - Medication is active on med list        Passed - Patient is age 18 or older        Passed - Patient is not pregnant        Passed - No positive pregnancy test on record in past 12 mos        Last Written Prescription Date:  3/26/2021  Last Fill Quantity: 90,  # refills: 2        hydrochlorothiazide (HYDRODIURIL) 25 MG tablet [Pharmacy Med Name: HYDROCHLOROTHIAZIDE 25MG TABLETS] 90 tablet 2     Sig: TAKE 1 TABLET BY MOUTH DAILY       Diuretics (Including Combos) Protocol Passed - 12/23/2021  8:54 AM        Passed - Blood pressure under 140/90 in past 12 months     BP Readings from Last 3 Encounters:   09/24/21 125/76   06/17/21 (!) 150/84   05/11/21 (!) 144/81                 Passed - Recent (12 mo) or future (30 days) visit within the authorizing provider's specialty     Patient has had an office visit with the authorizing provider or a provider within the authorizing providers department within the previous 12 mos or has a future within " "next 30 days. See \"Patient Info\" tab in inbasket, or \"Choose Columns\" in Meds & Orders section of the refill encounter.              Passed - Medication is active on med list        Passed - Patient is age 18 or older        Passed - No active pregancy on record        Passed - Normal serum creatinine on file in past 12 months     Recent Labs   Lab Test 09/24/21  1208   CR 0.87              Passed - Normal serum potassium on file in past 12 months     Recent Labs   Lab Test 09/24/21  1208   POTASSIUM 3.7                    Passed - Normal serum sodium on file in past 12 months     Recent Labs   Lab Test 09/24/21  1208                 Passed - No positive pregnancy test in past 12 months             Esther Mercado RN 12/25/21 6:46 PM  "

## 2022-01-08 ENCOUNTER — OFFICE VISIT (OUTPATIENT)
Dept: FAMILY MEDICINE | Facility: CLINIC | Age: 82
End: 2022-01-08
Payer: COMMERCIAL

## 2022-01-08 VITALS
SYSTOLIC BLOOD PRESSURE: 136 MMHG | WEIGHT: 147.8 LBS | TEMPERATURE: 98 F | BODY MASS INDEX: 26.18 KG/M2 | HEART RATE: 88 BPM | DIASTOLIC BLOOD PRESSURE: 73 MMHG | RESPIRATION RATE: 17 BRPM | OXYGEN SATURATION: 98 %

## 2022-01-08 DIAGNOSIS — M25.522 LEFT ELBOW PAIN: Primary | ICD-10-CM

## 2022-01-08 PROCEDURE — 99213 OFFICE O/P EST LOW 20 MIN: CPT | Performed by: FAMILY MEDICINE

## 2022-01-08 RX ORDER — ASPIRIN 81 MG/1
TABLET, COATED ORAL
COMMUNITY
Start: 2021-09-30 | End: 2022-05-02

## 2022-01-08 NOTE — PROGRESS NOTES
Assessment & Plan     Left elbow pain    - XR Elbow Left 2 Views; Future  - XR Elbow Left 2 Views    Repeat xray today confirms no acute fracture- favors olecranon bursitis which mirrors clinical exam findings today.  ACE is placced today to help resolve olecranon bursitis- but advised may takes weeks.  Close Follow-up if any new or worsening sx.    Devorah Staples MD  [facility] JAME Cabrera is a 81 year old who presents for the following health issues     HPI     Was seen in [facility] 1-3-2022 following a fall.  Dxed with LEFT olecranon bursitis but xray showed possible avulsion fracture- was told to Follow-up for repeat xray in 5 days.  Here today with .  Has no pain in LEFT elbow-- significant bruising noted with + olecranon bursitis still present.  Full ROM of LUE today.      Review of Systems   Constitutional, HEENT, cardiovascular, pulmonary, GI, , musculoskeletal, neuro, skin, endocrine and psych systems are negative, except as otherwise noted.      Objective    /73 (BP Location: Right arm, Patient Position: Sitting, Cuff Size: Adult Regular)   Pulse 88   Temp 98  F (36.7  C) (Oral)   Resp 17   Wt 67 kg (147 lb 12.8 oz)   SpO2 98%   BMI 26.18 kg/m    Body mass index is 26.18 kg/m .  Physical Exam   GENERAL: healthy, alert and no distress  EYES: Eyes grossly normal to inspection, PERRL and conjunctivae and sclerae normal  MS: no gross musculoskeletal defects noted, full ROM and normal strength of LUE and LEFT elbow with + bruising around elbow and + olecranon bursitis of LEFT elbow  SKIN: no suspicious lesions or rashes  PSYCH: mentation appears normal, affect normal/bright    Xray -  EXAM DATE:         01/08/2022     EXAM: X-RAY ELBOW LEFT, AP AND LATERAL  LOCATION: [facility]  DATE/TIME: 1/8/2022 11:30 AM     INDICATION: FU xray from [facility] 1-3-2021- r/o avulsion fracture vs osteophyte anterior  humerus  COMPARISON: 01/03/2022     IMPRESSION: Moderate soft tissue swelling over the olecranon, favored to represent bursitis. No definite acute fracture. Chronic appearing ossicle anteriorly, likely intra-articular. There is normal joint alignment. Mild degenerative changes.

## 2022-02-17 ENCOUNTER — TRANSFERRED RECORDS (OUTPATIENT)
Dept: HEALTH INFORMATION MANAGEMENT | Facility: CLINIC | Age: 82
End: 2022-02-17
Payer: COMMERCIAL

## 2022-02-17 LAB
CREATININE (EXTERNAL): 0.9 MG/DL (ref 0.57–1.27)
GFR ESTIMATED (EXTERNAL): 60 ML/MIN/1.73M2
TSH SERPL-ACNC: 9.45 MIU/L (ref 0.58–5.5)

## 2022-02-22 PROBLEM — M16.11 PRIMARY OSTEOARTHRITIS OF RIGHT HIP: Status: ACTIVE | Noted: 2021-08-31

## 2022-02-22 PROBLEM — M20.5X1 HALLUX LIMITUS, RIGHT: Status: ACTIVE | Noted: 2020-11-02

## 2022-02-23 ENCOUNTER — OFFICE VISIT (OUTPATIENT)
Dept: FAMILY MEDICINE | Facility: CLINIC | Age: 82
End: 2022-02-23
Payer: COMMERCIAL

## 2022-02-23 VITALS
HEART RATE: 93 BPM | WEIGHT: 149 LBS | BODY MASS INDEX: 26.39 KG/M2 | SYSTOLIC BLOOD PRESSURE: 111 MMHG | DIASTOLIC BLOOD PRESSURE: 77 MMHG

## 2022-02-23 DIAGNOSIS — R15.2 INCONTINENCE OF FECES WITH FECAL URGENCY: Primary | ICD-10-CM

## 2022-02-23 DIAGNOSIS — Z00.00 HEALTHCARE MAINTENANCE: ICD-10-CM

## 2022-02-23 DIAGNOSIS — R15.9 INCONTINENCE OF FECES WITH FECAL URGENCY: Primary | ICD-10-CM

## 2022-02-23 PROCEDURE — 99213 OFFICE O/P EST LOW 20 MIN: CPT | Performed by: FAMILY MEDICINE

## 2022-02-23 PROCEDURE — 0054A COVID-19,PF,PFIZER (12+ YRS): CPT | Performed by: FAMILY MEDICINE

## 2022-02-23 PROCEDURE — 91305 COVID-19,PF,PFIZER (12+ YRS): CPT | Performed by: FAMILY MEDICINE

## 2022-02-23 NOTE — PATIENT INSTRUCTIONS
Try Miralax (1 capful in 1 cup of water) - take if you haven't had a bowel movement in 3 days.    Try Imodium A-D, 1 tablet as needed for loose stool (could try this before getting on the plane).

## 2022-02-23 NOTE — PROGRESS NOTES
"Assessment & Plan    1. Incontinence of feces with fecal urgency  This is an 81 yo female who has had a couple episodes of fecal incontinence - urgency comes on without warning - and she has had embarrassing episodes of inability to hold her stool.  We discussed this - consider colon evaluation (scope?).  But in interim, increase fiber/water, add Imodium prior to planned flights.      2. Healthcare maintenance  Reviewed HM  - REVIEW OF HEALTH MAINTENANCE PROTOCOL ORDERS      Return in about 2 months (around 4/23/2022) for if not getting better.    Chief Complaint   Patient presents with     Bowl movement issues     since last Friday      HPI  Irritable bowel symptoms  Was at store  Bowels \"exploded\" once last week  Happened ocne in [place] a few years ago  This year - in January - on a plane -   Usually a little constipated before - gets rid of that - and just comes                                                                     History of Present Illness     Reason for visit:  Stomach problems  Symptoms include:  Constant upset stomach and gut    She eats 0-1 servings of fruits and vegetables daily.She consumes 4 sweetened beverage(s) daily.She exercises with enough effort to increase her heart rate 9 or less minutes per day.  She exercises with enough effort to increase her heart rate 3 or less days per week.   She is taking medications regularly.       Patient Active Problem List   Diagnosis     Chronic Constipation     Osteoarthritis Of The Knee     Osteoporosis     Atypical Chest Pain     Seborrheic Dermatitis     Seborrheic Dermatitis Of The Scalp     Itching (Pruritus)     Peripheral Neuropathy     Asymptomatic Coronary Arteriosclerosis     Hypermagnesemia     Restless Legs Syndrome     Hypertension     Spinal Stenosis     Epistaxis     Cerumen Impaction     Vertigo     Cough     Patellofemoral Syndrome     Onychomycosis Of The Toenails     Chronic cough     Acute bronchitis     Skin tag     " Gastroesophageal reflux disease without esophagitis     Pseudotumor (inflammatory) of orbit, right     Right flank pain, chronic     Hammertoe of second toe of right foot     Bilateral hearing loss     Tinnitus, bilateral     Bunion, right     Primary osteoarthritis of right hip     Hallux limitus, right        Past Medical History:   Diagnosis Date     Constipation      Coronary atherosclerosis      Dizziness and giddiness      Epistaxis      Essential hypertension      Gastroesophageal reflux disease without esophagitis 2/18/2016     Hereditary and idiopathic peripheral neuropathy      Impacted cerumen      Osteoarthrosis involving lower leg      Osteoporosis      Pain in joint, lower leg      Pseudotumor (inflammatory) of orbit, 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     Restless legs syndrome (RLS)      Seborrheic dermatitis, unspecified      Spinal stenosis, unspecified region other than cervical         Current Outpatient Medications   Medication     calcium carbonate-vitamin D3 (CALCIUM 600 + D,3,) 600 mg(1,500mg) -200 unit per tablet     gabapentin (NEURONTIN) 300 MG capsule     hydrochlorothiazide (HYDRODIURIL) 25 MG tablet     MULTIVITAMIN (MULTIPLE VITAMIN ORAL)     nortriptyline (PAMELOR) 75 MG capsule     ASPIRIN LOW DOSE 81 MG EC tablet     glucosamn-msm-C-shady-herbal 21 (GLUCOSAMINE-MSM COMPLEX) Tab     omeprazole (PRILOSEC) 20 MG DR capsule     No current facility-administered medications for this visit.        Past Surgical History:   Procedure Laterality Date     ABDOMEN SURGERY       APPENDECTOMY       BACK SURGERY       CHOLECYSTECTOMY       COSMETIC SURGERY       EYE SURGERY       FRACTURE SURGERY       HC DILATION/CURETTAGE DIAG/THER NON OB      Description: Dilation And Curettage;  Recorded: 02/02/2010;  Comments: due to miscarriage     HC KNEE SCOPE, DIAGNOSTIC      Description: Arthroscopy Knee Right;  Recorded: 02/02/2010;  Comments: right knee - arthroscopy for debridement; then Synvisc     HC REMOVAL  GALLBLADDER      Description: Cholecystectomy;  Recorded: 02/02/2010;  Comments: age 30s     LUMBAR FUSION  08/2013        Social History     Socioeconomic History     Marital status:      Spouse name: Not on file     Number of children: Not on file     Years of education: Not on file     Highest education level: Not on file   Occupational History     Not on file   Tobacco Use     Smoking status: Never Smoker     Smokeless tobacco: Never Used   Substance and Sexual Activity     Alcohol use: No     Drug use: No     Sexual activity: Not on file   Other Topics Concern     Not on file   Social History Narrative     Not on file     Social Determinants of Health     Financial Resource Strain: Not on file   Food Insecurity: Not on file   Transportation Needs: Not on file   Physical Activity: Not on file   Stress: Not on file   Social Connections: Not on file   Intimate Partner Violence: Not on file   Housing Stability: Not on file        No family history on file.     Review of Systems   Constitutional: Negative for activity change, appetite change, chills, fever and unexpected weight change.   Gastrointestinal: Negative for abdominal pain and rectal pain.        Fecal incontinence   Neurological: Negative.    All other systems reviewed and are negative.       /77 (BP Location: Left arm, Patient Position: Sitting, Cuff Size: Adult Regular)   Pulse 93   Wt 67.6 kg (149 lb)   BMI 26.39 kg/m       Physical Exam  Constitutional:       General: She is not in acute distress.     Appearance: She is well-developed.   HENT:      Right Ear: Tympanic membrane and external ear normal.      Left Ear: Tympanic membrane and external ear normal.      Nose: Nose normal.      Mouth/Throat:      Pharynx: No oropharyngeal exudate.   Eyes:      General:         Right eye: No discharge.         Left eye: No discharge.      Conjunctiva/sclera: Conjunctivae normal.      Pupils: Pupils are equal, round, and reactive to light.    Neck:      Thyroid: No thyromegaly.      Trachea: No tracheal deviation.   Cardiovascular:      Rate and Rhythm: Normal rate and regular rhythm.      Pulses: Normal pulses.      Heart sounds: Normal heart sounds, S1 normal and S2 normal. No murmur heard.    No friction rub. No S3 or S4 sounds.   Pulmonary:      Effort: Pulmonary effort is normal. No respiratory distress.      Breath sounds: Normal breath sounds. No wheezing or rales.   Abdominal:      General: Bowel sounds are normal.      Palpations: Abdomen is soft. There is no mass.      Tenderness: There is no abdominal tenderness. There is no guarding or rebound.   Musculoskeletal:         General: Normal range of motion.      Cervical back: Neck supple.   Lymphadenopathy:      Cervical: No cervical adenopathy.   Skin:     General: Skin is warm and dry.      Findings: No rash.   Neurological:      Mental Status: She is alert and oriented to person, place, and time.      Motor: No abnormal muscle tone.      Deep Tendon Reflexes: Reflexes are normal and symmetric.   Psychiatric:         Thought Content: Thought content normal.         Judgment: Judgment normal.          Results:  No results found for any visits on 02/23/22.    Medications at Conclusion of Visit:  Current Outpatient Medications   Medication Sig Dispense Refill     calcium carbonate-vitamin D3 (CALCIUM 600 + D,3,) 600 mg(1,500mg) -200 unit per tablet [CALCIUM CARBONATE-VITAMIN D3 (CALCIUM 600 + D,3,) 600 MG(1,500MG) -200 UNIT PER TABLET] Take 1 tablet by mouth 2 (two) times a day.  0     gabapentin (NEURONTIN) 300 MG capsule [GABAPENTIN (NEURONTIN) 300 MG CAPSULE] TAKE 2 CAPSULES THREE TIMES DAILY 540 capsule 3     hydrochlorothiazide (HYDRODIURIL) 25 MG tablet TAKE 1 TABLET BY MOUTH DAILY 90 tablet 2     MULTIVITAMIN (MULTIPLE VITAMIN ORAL) [MULTIVITAMIN (MULTIPLE VITAMIN ORAL)] Take 1 tablet by mouth daily.       nortriptyline (PAMELOR) 75 MG capsule TAKE 1 CAPSULE BY MOUTH AT BEDTIME 90  capsule 2     ASPIRIN LOW DOSE 81 MG EC tablet Take 2 Tablets by mouth daily for 42 days. Indications: DVT Prophylaxis (Patient not taking: Reported on 2/23/2022)       glucosamn-msm-C-shady-herbal 21 (GLUCOSAMINE-MSM COMPLEX) Tab [GLUCOSAMN-MSM-C-SHADY-HERBAL 21 (GLUCOSAMINE-MSM COMPLEX) TAB] Take 1 tablet by mouth 2 (two) times a day. (Patient not taking: Reported on 2/23/2022)  0     omeprazole (PRILOSEC) 20 MG DR capsule Take 1 Capsule by mouth daily. Take 1 hour before a meal. (Patient not taking: Reported on 2/23/2022)           DANICA LARA MD

## 2022-03-06 ASSESSMENT — ENCOUNTER SYMPTOMS
RECTAL PAIN: 0
CHILLS: 0
APPETITE CHANGE: 0
ACTIVITY CHANGE: 0
UNEXPECTED WEIGHT CHANGE: 0
NEUROLOGICAL NEGATIVE: 1
FEVER: 0
ROS GI COMMENTS: FECAL INCONTINENCE
ABDOMINAL PAIN: 0

## 2022-03-25 ENCOUNTER — OFFICE VISIT (OUTPATIENT)
Dept: OTOLARYNGOLOGY | Facility: CLINIC | Age: 82
End: 2022-03-25
Payer: COMMERCIAL

## 2022-03-25 DIAGNOSIS — H61.23 BILATERAL IMPACTED CERUMEN: Primary | ICD-10-CM

## 2022-03-25 PROCEDURE — 69210 REMOVE IMPACTED EAR WAX UNI: CPT | Performed by: OTOLARYNGOLOGY

## 2022-03-25 NOTE — PROGRESS NOTES
HPI: This patient presents to clinic today for cerumen removal. Has noticed some fullness of the ears and muffled hearing, but denies otalgia, otorrhea, vertigo, change in true hearing or tinnitus. Denies other symptoms. Also has bilateral, non-pulsatile tinnitus and some probable hearing loss.    Past medical history, surgical history, family history, social history, medications, and allergies have been reviewed and are documented above.     Review of Systems: a 10-system review was performed. Symptoms are noted in the HPI and on a scanned document in the computer chart.    Physical Examination:   GEN: no acute distress, alert and oriented  EYES: extraocular movements intact, pupils equal and round. Sclera clear.   EARS: bilateral cerumen impactions cleared under binocular microscopy using suction/loop/forceps. The tympanic membranes are noted to be intact and clear with no signs of infections, effusions, perforations, or retractions.  PULM: breathing comfortably on room air with no stertor or stridor. Chest expansion symmetric.  CARDS: no cyanosis or clubbing, normal carotid pulses    MEDICAL DECISION-MAKING: cerumen impactions cleared under binocular microscopy without difficulty. Hearing restored to baseline. Follow-up with an audiogram to assess the hearing/tinnitus.

## 2022-03-25 NOTE — LETTER
3/25/2022         RE: Deborah Mireles  [address]  [place] [id number]        Dear Colleague,    Thank you for referring your patient, Deborah Mireles, to the [facility]. Please see a copy of my visit note below.    HPI: This patient presents to clinic today for cerumen removal. Has noticed some fullness of the ears and muffled hearing, but denies otalgia, otorrhea, vertigo, change in true hearing or tinnitus. Denies other symptoms. Also has bilateral, non-pulsatile tinnitus and some probable hearing loss.    Past medical history, surgical history, family history, social history, medications, and allergies have been reviewed and are documented above.     Review of Systems: a 10-system review was performed. Symptoms are noted in the HPI and on a scanned document in the computer chart.    Physical Examination:   GEN: no acute distress, alert and oriented  EYES: extraocular movements intact, pupils equal and round. Sclera clear.   EARS: bilateral cerumen impactions cleared under binocular microscopy using suction/loop/forceps. The tympanic membranes are noted to be intact and clear with no signs of infections, effusions, perforations, or retractions.  PULM: breathing comfortably on room air with no stertor or stridor. Chest expansion symmetric.  CARDS: no cyanosis or clubbing, normal carotid pulses    MEDICAL DECISION-MAKING: cerumen impactions cleared under binocular microscopy without difficulty. Hearing restored to baseline. Follow-up with an audiogram to assess the hearing/tinnitus.        Again, thank you for allowing me to participate in the care of your patient.        Sincerely,        Karmen Borja MD

## 2022-03-29 DIAGNOSIS — G25.81 RESTLESS LEG SYNDROME: ICD-10-CM

## 2022-03-31 NOTE — TELEPHONE ENCOUNTER
Routing refill request to provider for review/approval because:  Drug not on the [facility] refill protocol     Last Written Prescription Date:  3/26/2021  Last Fill Quantity: 540,  # refills: 3   Last office visit provider:  2/23/2022 Dr. Woodall     gabapentin (NEURONTIN) 300 MG capsule 540 capsule 3 3/26/2021  No   Sig: TAKE 2 CAPSULES THREE TIMES DAILY   Sent to pharmacy as: gabapentin 300 mg capsule (NEURONTIN)   E-Prescribing Status: Receipt confirmed by pharmacy (3/26/2021 12:42 PM CDT         Requested Prescriptions   Pending Prescriptions Disp Refills     gabapentin (NEURONTIN) 300 MG capsule [Pharmacy Med Name: GABAPENTIN 300MG CAPSULES] 540 capsule 3     Sig: TAKE 2 CAPSULES BY MOUTH THREE TIMES DAILY GENERIC EQUIVALENT FOR NEURONTIN       There is no refill protocol information for this order          Tresa Coleman RN 03/31/22 4:21 PM

## 2022-04-01 RX ORDER — GABAPENTIN 300 MG/1
CAPSULE ORAL
Qty: 540 CAPSULE | Refills: 3 | Status: SHIPPED | OUTPATIENT
Start: 2022-04-01 | End: 2023-02-28

## 2022-04-26 ENCOUNTER — OFFICE VISIT (OUTPATIENT)
Dept: FAMILY MEDICINE | Facility: CLINIC | Age: 82
End: 2022-04-26
Payer: COMMERCIAL

## 2022-04-26 VITALS
TEMPERATURE: 97.1 F | BODY MASS INDEX: 26.26 KG/M2 | WEIGHT: 148.2 LBS | HEART RATE: 95 BPM | DIASTOLIC BLOOD PRESSURE: 72 MMHG | HEIGHT: 63 IN | OXYGEN SATURATION: 98 % | SYSTOLIC BLOOD PRESSURE: 146 MMHG

## 2022-04-26 DIAGNOSIS — H25.9 AGE-RELATED CATARACT OF BOTH EYES, UNSPECIFIED AGE-RELATED CATARACT TYPE: ICD-10-CM

## 2022-04-26 DIAGNOSIS — Z01.818 PREOP EXAMINATION: Primary | ICD-10-CM

## 2022-04-26 DIAGNOSIS — E03.9 HYPOTHYROIDISM, UNSPECIFIED TYPE: ICD-10-CM

## 2022-04-26 DIAGNOSIS — I10 ESSENTIAL HYPERTENSION: ICD-10-CM

## 2022-04-26 LAB
ANION GAP SERPL CALCULATED.3IONS-SCNC: 14 MMOL/L (ref 5–18)
BUN SERPL-MCNC: 14 MG/DL (ref 8–28)
CALCIUM SERPL-MCNC: 9.9 MG/DL (ref 8.5–10.5)
CHLORIDE BLD-SCNC: 101 MMOL/L (ref 98–107)
CO2 SERPL-SCNC: 26 MMOL/L (ref 22–31)
CREAT SERPL-MCNC: 0.83 MG/DL (ref 0.6–1.1)
GFR SERPL CREATININE-BSD FRML MDRD: 70 ML/MIN/1.73M2
GLUCOSE BLD-MCNC: 88 MG/DL (ref 70–125)
POTASSIUM BLD-SCNC: 3.9 MMOL/L (ref 3.5–5)
SODIUM SERPL-SCNC: 141 MMOL/L (ref 136–145)
TSH SERPL DL<=0.005 MIU/L-ACNC: 3.14 UIU/ML (ref 0.3–5)

## 2022-04-26 PROCEDURE — 84443 ASSAY THYROID STIM HORMONE: CPT | Performed by: FAMILY MEDICINE

## 2022-04-26 PROCEDURE — 80048 BASIC METABOLIC PNL TOTAL CA: CPT | Performed by: FAMILY MEDICINE

## 2022-04-26 PROCEDURE — 36415 COLL VENOUS BLD VENIPUNCTURE: CPT | Performed by: FAMILY MEDICINE

## 2022-04-26 PROCEDURE — 99214 OFFICE O/P EST MOD 30 MIN: CPT | Performed by: FAMILY MEDICINE

## 2022-04-26 NOTE — PROGRESS NOTES
[facility] [address] [place] [address]  Phone: [phone]  Fax: [phone]  Primary Provider: Emmanuelle Woodall  Pre-op Performing Provider: EMMANUELLE WOODALL      PREOPERATIVE EVALUATION:  Today's date: 4/26/2022    Deborah Mireles is a 82 year old female who presents for a preoperative evaluation.    Surgical Information:  Surgery/Procedure: Phaco IOL- Left Eye  Surgery Location: [facility]  Surgeon: Dr. Wendi Quinteros  Surgery Date: 5/10/22  Time of Surgery: 7:50AM  Where patient plans to recover: At home with family  Fax number for surgical facility: Fax [phone]    Type of Anesthesia Anticipated: to be determined    Assessment & Plan     The proposed surgical procedure is considered LOW risk.    Problem List Items Addressed This Visit        Circulatory    Hypertension    Relevant Orders    Basic metabolic panel  (Ca, Cl, CO2, Creat, Gluc, K, Na, BUN) (Completed)      Other Visit Diagnoses     Preop examination    -  Primary    Age-related cataract of both eyes, unspecified age-related cataract type        Hypothyroidism, unspecified type        Relevant Orders    TSH with free T4 reflex (Completed)           this is an 81 yo female with cataracts (L>R) - planning cataract surgery in near future.  Will do left cataract first (on 5/10/22) and right side a couple weeks later.  Low risk surgery.  OK for surgery.      H/o hypothyroidism - will check tSH  Hypertension - blood pressure is borderline at 146/72 today - continue current medications.      Patient also has h/o ?Sjogrens (severe dry mouth).  Longstanding/unchanged.     Risks and Recommendations:  The patient has the following additional risks and recommendations for perioperative complications:   - No identified additional risk factors other than previously addressed    Medication Instructions:  Patient is to take all scheduled medications on the day of  surgery    RECOMMENDATION:  APPROVAL GIVEN to proceed with proposed procedure, without further diagnostic evaluation.    Review of external notes as documented above   Viewed available notes in record.        Subjective     HPI related to upcoming procedure: h/o bilateral cataracts  - vision impairing       Preop Questions 4/26/2022   1. Have you ever had a heart attack or stroke? No   2. Have you ever had surgery on your heart or blood vessels, such as a stent placement, a coronary artery bypass, or surgery on an artery in your head, neck, heart, or legs? No   3. Do you have chest pain with activity? No   4. Do you have a history of  heart failure? No   5. Do you currently have a cold, bronchitis or symptoms of other infection? No   6. Do you have a cough, shortness of breath, or wheezing? No   7. Do you or anyone in your family have previous history of blood clots? No   8. Do you or does anyone in your family have a serious bleeding problem such as prolonged bleeding following surgeries or cuts? No   9. Have you ever had problems with anemia or been told to take iron pills? No   10. Have you had any abnormal blood loss such as black, tarry or bloody stools, or abnormal vaginal bleeding? No   11. Have you ever had a blood transfusion? No   12. Are you willing to have a blood transfusion if it is medically needed before, during, or after your surgery? Yes   13. Have you or any of your relatives ever had problems with anesthesia? No   14. Do you have sleep apnea, excessive snoring or daytime drowsiness? No   15. Do you have any artifical heart valves or other implanted medical devices like a pacemaker, defibrillator, or continuous glucose monitor? No   16. Do you have artificial joints? YES - right hip     17. Are you allergic to latex? No     Health Care Directive:  Patient does not have a Health Care Directive or Living Will: iscussed    Preoperative Review of :  Filled  Written  Sold  ID  Drug  QTY  Days   Prescriber     04/04/2022 04/01/2022 04/04/2022  2  Gabapentin 300 Mg Capsule   540.00  90  Ch Uls     12/27/2021 03/26/2021 12/27/2021  2  Gabapentin 300 Mg Capsule   540.00  90  Ch Uls     10/06/2021  10/06/2021  10/06/2021  2  Tramadol Hcl 50 Mg Tablet   60.00  10  Wi Sto     09/30/2021 09/30/2021 09/30/2021  1  Tramadol Hcl 50 Mg Tablet   56.00  7  Wi Sto              Status of Chronic Conditions:  Dry mouth - uses hard candies/gums to stimulate saliva    Review of Systems   Constitutional: Negative for chills, fever and unexpected weight change.   HENT:        Dry mouth   Eyes: Positive for visual disturbance (decreased vision due to cataracts).   Respiratory: Negative.  Negative for cough and shortness of breath.    Cardiovascular: Negative for chest pain and leg swelling.   Gastrointestinal: Negative for abdominal pain.   Endocrine: Negative for polydipsia and polyuria.   Genitourinary: Negative.    Musculoskeletal: Negative.    Skin: Negative.    Allergic/Immunologic: Negative.    Neurological: Negative.    Hematological: Negative.    Psychiatric/Behavioral: Negative.        Poor vision        Patient Active Problem List    Diagnosis Date Noted     Primary osteoarthritis of right hip 08/31/2021     Priority: Medium     Formatting of this note might be different from the original.  Added automatically from request for surgery [id number]    Formatting of this note might be different from the original.  Added automatically from request for surgery [id number]       Hallux limitus, right 11/02/2020     Priority: Medium     Formatting of this note might be different from the original.  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       Osteoporosis      Priority: Medium     Created by Conversion  Replacement Utility updated for latest IMO load         Itching (Pruritus)      Priority: Medium     Created by Conversion  Replacement Utility updated for latest IMO load         Peripheral Neuropathy      Priority: Medium     Created by Conversion  [facility] Annotation: Jan 31 2009  4:00PM - Chance Hussein: unclear etiology  Replacement Utility updated for latest IMO load         Asymptomatic Coronary Arteriosclerosis      Priority: Medium     Created by Conversion  Replacement Utility updated for latest IMO load         Hypermagnesemia      Priority: Medium     Created by Conversion  Replacement Utility updated for latest IMO load         Hypertension      Priority: Medium     Created by Conversion  Replacement Utility updated for latest IMO load         Gastroesophageal reflux disease without esophagitis 02/18/2016     Priority: Medium     Skin tag 12/25/2015     Priority: Medium     Acute bronchitis 09/14/2015     Priority: Medium     Chronic cough 01/05/2015     Priority: Medium     Restless Legs Syndrome      Priority: Medium     Created by Conversion         Onychomycosis Of The Toenails      Priority: Medium     Created by Conversion         Atypical Chest Pain      Priority: Medium     Created by Conversion         Seborrheic Dermatitis      Priority: Medium     Created by Conversion         Seborrheic  Dermatitis Of The Scalp      Priority: Medium     Created by Conversion         Spinal Stenosis      Priority: Medium     Created by Conversion         Epistaxis      Priority: Medium     Created by Conversion         Cerumen Impaction      Priority: Medium     Created by Conversion         Vertigo      Priority: Medium     Created by Conversion         Cough      Priority: Medium     Created by Conversion         Patellofemoral Syndrome      Priority: Medium     Created by Conversion  [facility] Annotation: Jan 31 2009  4:00PM - Emmanuelle Woodall:   steroid injection per Dr. Mattson          Past Medical History:   Diagnosis Date     Constipation      Coronary atherosclerosis      Dizziness and giddiness      Epistaxis      Essential hypertension      Gastroesophageal reflux disease without esophagitis 2/18/2016     Hereditary and idiopathic peripheral neuropathy      Impacted cerumen      Osteoarthrosis involving lower leg      Osteoporosis      Pain in joint, lower leg      Pseudotumor (inflammatory) of orbit, 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     Restless legs syndrome (RLS)      Seborrheic dermatitis, unspecified      Spinal stenosis, unspecified region other than cervical      Past Surgical History:   Procedure Laterality Date     ABDOMEN SURGERY       APPENDECTOMY       BACK SURGERY       CHOLECYSTECTOMY       COSMETIC SURGERY       EYE SURGERY       FRACTURE SURGERY       HC DILATION/CURETTAGE DIAG/THER NON OB      Description: Dilation And Curettage;  Recorded: 02/02/2010;  Comments: due to miscarriage     HC KNEE SCOPE, DIAGNOSTIC      Description: Arthroscopy Knee Right;  Recorded: 02/02/2010;  Comments: right knee - arthroscopy for debridement; then Synvisc     HC REMOVAL GALLBLADDER      Description: Cholecystectomy;  Recorded: 02/02/2010;  Comments: age 30s     LUMBAR FUSION  08/2013     Current Outpatient Medications   Medication Sig Dispense Refill     gabapentin (NEURONTIN) 300 MG capsule TAKE 2 CAPSULES BY  "MOUTH THREE TIMES DAILY GENERIC EQUIVALENT FOR NEURONTIN 540 capsule 3     ASPIRIN LOW DOSE 81 MG EC tablet Take 2 Tablets by mouth daily for 42 days. Indications: DVT Prophylaxis       calcium carbonate-vitamin D3 (CALCIUM 600 + D,3,) 600 mg(1,500mg) -200 unit per tablet [CALCIUM CARBONATE-VITAMIN D3 (CALCIUM 600 + D,3,) 600 MG(1,500MG) -200 UNIT PER TABLET] Take 1 tablet by mouth 2 (two) times a day.  0     glucosamn-msm-C-shady-herbal 21 (GLUCOSAMINE-MSM COMPLEX) Tab [GLUCOSAMN-MSM-C-SHADY-HERBAL 21 (GLUCOSAMINE-MSM COMPLEX) TAB] Take 1 tablet by mouth 2 (two) times a day.  0     hydrochlorothiazide (HYDRODIURIL) 25 MG tablet TAKE 1 TABLET BY MOUTH DAILY 90 tablet 2     MULTIVITAMIN (MULTIPLE VITAMIN ORAL) [MULTIVITAMIN (MULTIPLE VITAMIN ORAL)] Take 1 tablet by mouth daily.       nortriptyline (PAMELOR) 75 MG capsule TAKE 1 CAPSULE BY MOUTH AT BEDTIME 90 capsule 2     omeprazole (PRILOSEC) 20 MG DR capsule Take 1 Capsule by mouth daily. Take 1 hour before a meal.         No Known Allergies     Social History     Tobacco Use     Smoking status: Never Smoker     Smokeless tobacco: Never Used   Substance Use Topics     Alcohol use: No     No family history on file.  History   Drug Use No         Objective     BP (!) 146/72 (BP Location: Left arm, Patient Position: Sitting, Cuff Size: Adult Small)   Pulse 95   Temp 97.1  F (36.2  C)   Ht 1.61 m (5' 3.39\")   Wt 67.2 kg (148 lb 3.2 oz)   SpO2 98%   BMI 25.93 kg/m      Physical Exam  Constitutional:       General: She is not in acute distress.     Appearance: She is well-developed.   HENT:      Right Ear: Tympanic membrane and external ear normal.      Left Ear: Tympanic membrane and external ear normal.      Nose: Nose normal.      Mouth/Throat:      Pharynx: No oropharyngeal exudate.   Eyes:      General:         Right eye: No discharge.         Left eye: No discharge.      Conjunctiva/sclera: Conjunctivae normal.      Pupils: Pupils are equal, round, and reactive " to light.   Neck:      Thyroid: No thyromegaly.      Trachea: No tracheal deviation.   Cardiovascular:      Rate and Rhythm: Normal rate and regular rhythm.      Pulses: Normal pulses.      Heart sounds: Normal heart sounds, S1 normal and S2 normal. No murmur heard.    No friction rub. No S3 or S4 sounds.   Pulmonary:      Effort: Pulmonary effort is normal. No respiratory distress.      Breath sounds: Normal breath sounds. No wheezing or rales.   Abdominal:      General: Bowel sounds are normal.      Palpations: Abdomen is soft. There is no mass.      Tenderness: There is no abdominal tenderness.   Musculoskeletal:         General: Normal range of motion.      Cervical back: Neck supple.   Lymphadenopathy:      Cervical: No cervical adenopathy.   Skin:     General: Skin is warm and dry.      Findings: No rash.   Neurological:      Mental Status: She is alert and oriented to person, place, and time.      Motor: No abnormal muscle tone.      Deep Tendon Reflexes: Reflexes are normal and symmetric.   Psychiatric:         Thought Content: Thought content normal.         Judgment: Judgment normal.           Recent Labs   Lab Test 09/24/21  1208 11/09/20  1153 09/08/20  1038   HGB 14.5 13.2 13.9   PLT  --   --  351     --  140   POTASSIUM 3.7 3.9 3.9   CR 0.87  --  0.80        Diagnostics:  Recent Results (from the past 168 hour(s))   TSH with free T4 reflex    Collection Time: 04/26/22  4:00 PM   Result Value Ref Range    TSH 3.14 0.30 - 5.00 uIU/mL   Basic metabolic panel  (Ca, Cl, CO2, Creat, Gluc, K, Na, BUN)    Collection Time: 04/26/22  4:00 PM   Result Value Ref Range    Sodium 141 136 - 145 mmol/L    Potassium 3.9 3.5 - 5.0 mmol/L    Chloride 101 98 - 107 mmol/L    Carbon Dioxide (CO2) 26 22 - 31 mmol/L    Anion Gap 14 5 - 18 mmol/L    Urea Nitrogen 14 8 - 28 mg/dL    Creatinine 0.83 0.60 - 1.10 mg/dL    Calcium 9.9 8.5 - 10.5 mg/dL    Glucose 88 70 - 125 mg/dL    GFR Estimate 70 >60 mL/min/1.73m2      No  EKG required for low risk surgery (cataract, skin procedure, breast biopsy, etc).    Revised Cardiac Risk Index (RCRI):  The patient has the following serious cardiovascular risks for perioperative complications:   - No serious cardiac risks = 0 points     RCRI Interpretation: 0 points: Class I (very low risk - 0.4% complication rate)           Signed Electronically by: DANICA LARA MD  Copy of this evaluation report is provided to requesting physician.

## 2022-04-27 ENCOUNTER — OFFICE VISIT (OUTPATIENT)
Dept: OTOLARYNGOLOGY | Facility: CLINIC | Age: 82
End: 2022-04-27
Payer: COMMERCIAL

## 2022-04-27 ENCOUNTER — OFFICE VISIT (OUTPATIENT)
Dept: AUDIOLOGY | Facility: CLINIC | Age: 82
End: 2022-04-27
Payer: COMMERCIAL

## 2022-04-27 DIAGNOSIS — H91.93 BILATERAL HEARING LOSS, UNSPECIFIED HEARING LOSS TYPE: Primary | ICD-10-CM

## 2022-04-27 DIAGNOSIS — H93.13 TINNITUS OF BOTH EARS: ICD-10-CM

## 2022-04-27 DIAGNOSIS — H90.3 SENSORINEURAL HEARING LOSS (SNHL) OF BOTH EARS: Primary | ICD-10-CM

## 2022-04-27 DIAGNOSIS — H91.93 BILATERAL HEARING LOSS, UNSPECIFIED HEARING LOSS TYPE: ICD-10-CM

## 2022-04-27 DIAGNOSIS — H90.3 SENSORINEURAL HEARING LOSS, BILATERAL: Primary | ICD-10-CM

## 2022-04-27 PROCEDURE — 99207 PR NO CHARGE LOS: CPT | Performed by: AUDIOLOGIST

## 2022-04-27 PROCEDURE — 92550 TYMPANOMETRY & REFLEX THRESH: CPT | Performed by: AUDIOLOGIST

## 2022-04-27 PROCEDURE — 92557 COMPREHENSIVE HEARING TEST: CPT | Performed by: AUDIOLOGIST

## 2022-04-27 PROCEDURE — 99213 OFFICE O/P EST LOW 20 MIN: CPT | Performed by: OTOLARYNGOLOGY

## 2022-04-27 ASSESSMENT — ENCOUNTER SYMPTOMS
NEUROLOGICAL NEGATIVE: 1
MUSCULOSKELETAL NEGATIVE: 1
ALLERGIC/IMMUNOLOGIC NEGATIVE: 1
SHORTNESS OF BREATH: 0
COUGH: 0
PSYCHIATRIC NEGATIVE: 1
HEMATOLOGIC/LYMPHATIC NEGATIVE: 1
POLYDIPSIA: 0
ABDOMINAL PAIN: 0
UNEXPECTED WEIGHT CHANGE: 0
FEVER: 0
CHILLS: 0
RESPIRATORY NEGATIVE: 1

## 2022-04-27 NOTE — PROGRESS NOTES
HPI: This patient presents to clinic today for follow-up of her hearing loss. She was here a few weeks ago for cerumen removal, but despite cleaning, still noted some hearing loss. Also has bilateral, non-pulsatile tinnitus.     Past medical history, surgical history, family history, social history, medications, and allergies have been reviewed and are documented above.      Review of Systems: a 10-system review was performed. Symptoms are noted in the HPI and on a scanned document in the computer chart.     Physical Examination:   GEN: no acute distress, alert and oriented  EYES: extraocular movements intact, pupils equal and round. Sclera clear.   EARS: Clear ear canals. The tympanic membranes are noted to be intact and clear with no signs of infections, effusions, perforations, or retractions.  PULM: breathing comfortably on room air with no stertor or stridor. Chest expansion symmetric.  CARDS: no cyanosis or clubbing, normal carotid pulses    AUDIOGRAM: mild to moderate SNHL with symmetric WRS and type A tymps     MEDICAL DECISION-MAKING: This patient is an 83yo F with sensorineural hearing loss. Discussed hearing protection. Medically clear for hearing aids should the patient desire them.

## 2022-04-27 NOTE — PROGRESS NOTES
AUDIOLOGY REPORT    SUMMARY: Audiology visit completed. See audiogram for results.      RECOMMENDATIONS: Follow-up with ENT.    Alfonso Jimenez, CCC-A  [place] Licensed Audiologist #[id number]

## 2022-04-27 NOTE — LETTER
4/27/2022         RE: Deborah Mireles  [address] [place]  [place] [address]        Dear Colleague,    Thank you for referring your patient, Deborah Mireles, to the [facility]. Please see a copy of my visit note below.    HPI: This patient presents to clinic today for follow-up of her hearing loss. She was here a few weeks ago for cerumen removal, but despite cleaning, still noted some hearing loss. Also has bilateral, non-pulsatile tinnitus.     Past medical history, surgical history, family history, social history, medications, and allergies have been reviewed and are documented above.      Review of Systems: a 10-system review was performed. Symptoms are noted in the HPI and on a scanned document in the computer chart.     Physical Examination:   GEN: no acute distress, alert and oriented  EYES: extraocular movements intact, pupils equal and round. Sclera clear.   EARS: Clear ear canals. The tympanic membranes are noted to be intact and clear with no signs of infections, effusions, perforations, or retractions.  PULM: breathing comfortably on room air with no stertor or stridor. Chest expansion symmetric.  CARDS: no cyanosis or clubbing, normal carotid pulses    AUDIOGRAM: mild to moderate SNHL with symmetric WRS and type A tymps     MEDICAL DECISION-MAKING: This patient is an 83yo F with sensorineural hearing loss. Discussed hearing protection. Medically clear for hearing aids should the patient desire them.        Again, thank you for allowing me to participate in the care of your patient.        Sincerely,        Karmen Borja MD

## 2022-05-02 ENCOUNTER — OFFICE VISIT (OUTPATIENT)
Dept: FAMILY MEDICINE | Facility: CLINIC | Age: 82
End: 2022-05-02
Payer: COMMERCIAL

## 2022-05-02 VITALS
WEIGHT: 146.9 LBS | BODY MASS INDEX: 26.03 KG/M2 | OXYGEN SATURATION: 97 % | SYSTOLIC BLOOD PRESSURE: 109 MMHG | HEIGHT: 63 IN | HEART RATE: 98 BPM | DIASTOLIC BLOOD PRESSURE: 75 MMHG | TEMPERATURE: 98 F

## 2022-05-02 DIAGNOSIS — E78.2 MIXED HYPERLIPIDEMIA: ICD-10-CM

## 2022-05-02 DIAGNOSIS — Z23 NEED FOR TETANUS BOOSTER: ICD-10-CM

## 2022-05-02 DIAGNOSIS — E55.9 VITAMIN D DEFICIENCY: ICD-10-CM

## 2022-05-02 DIAGNOSIS — G60.9 HEREDITARY AND IDIOPATHIC PERIPHERAL NEUROPATHY: ICD-10-CM

## 2022-05-02 DIAGNOSIS — Z00.00 ENCOUNTER FOR MEDICARE ANNUAL WELLNESS EXAM: Primary | ICD-10-CM

## 2022-05-02 LAB
CHOLEST SERPL-MCNC: 185 MG/DL
FASTING STATUS PATIENT QL REPORTED: YES
HDLC SERPL-MCNC: 59 MG/DL
LDLC SERPL CALC-MCNC: 100 MG/DL
TRIGL SERPL-MCNC: 131 MG/DL
VIT B12 SERPL-MCNC: 438 PG/ML (ref 213–816)

## 2022-05-02 PROCEDURE — 99397 PER PM REEVAL EST PAT 65+ YR: CPT | Mod: 25 | Performed by: FAMILY MEDICINE

## 2022-05-02 PROCEDURE — 36415 COLL VENOUS BLD VENIPUNCTURE: CPT | Performed by: FAMILY MEDICINE

## 2022-05-02 PROCEDURE — 80061 LIPID PANEL: CPT | Performed by: FAMILY MEDICINE

## 2022-05-02 PROCEDURE — 82306 VITAMIN D 25 HYDROXY: CPT | Performed by: FAMILY MEDICINE

## 2022-05-02 PROCEDURE — 90715 TDAP VACCINE 7 YRS/> IM: CPT | Performed by: FAMILY MEDICINE

## 2022-05-02 PROCEDURE — 90471 IMMUNIZATION ADMIN: CPT | Performed by: FAMILY MEDICINE

## 2022-05-02 PROCEDURE — 82607 VITAMIN B-12: CPT | Performed by: FAMILY MEDICINE

## 2022-05-02 ASSESSMENT — ENCOUNTER SYMPTOMS
MUSCULOSKELETAL NEGATIVE: 1
FEVER: 0
UNEXPECTED WEIGHT CHANGE: 0
ENDOCRINE NEGATIVE: 1
GASTROINTESTINAL NEGATIVE: 1
ACTIVITY CHANGE: 0
PSYCHIATRIC NEGATIVE: 1
COUGH: 0
CHILLS: 0
ALLERGIC/IMMUNOLOGIC NEGATIVE: 1
HEMATOLOGIC/LYMPHATIC NEGATIVE: 1
NEUROLOGICAL NEGATIVE: 1
SHORTNESS OF BREATH: 0

## 2022-05-02 ASSESSMENT — ACTIVITIES OF DAILY LIVING (ADL): CURRENT_FUNCTION: NO ASSISTANCE NEEDED

## 2022-05-02 NOTE — PROGRESS NOTES
"SUBJECTIVE:   Deborah Mireles is a 82 year old female who presents for Preventive Visit.        Are you in the first 12 months of your Medicare coverage?  No    Having eye surgery - scheduled mid May -   Travel to [place] for granddaughter's graduation (college) - has an exhibit prior to graduation      Healthy Habits:     In general, how would you rate your overall health?  Excellent    Frequency of exercise:  4-5 days/week    Duration of exercise:  Less than 15 minutes    Do you usually eat at least 4 servings of fruit and vegetables a day, include whole grains    & fiber and avoid regularly eating high fat or \"junk\" foods?  Yes    Taking medications regularly:  Yes    Barriers to taking medications:  None    Medication side effects:  None    Ability to successfully perform activities of daily living:  No assistance needed    Home Safety:  Lack of grab bars in the bathroom    Hearing Impairment:  Difficulty following a conversation in a noisy restaurant or crowded room, feel that people are mumbling or not speaking clearly, difficult to understand a speaker at a public meeting or Jain service, need to ask people to speak up or repeat themselves and difficulty understanding soft or whispered speech    In the past 6 months, have you been bothered by leaking of urine?  No    In general, how would you rate your overall mental or emotional health?  Good      PHQ-2 Total Score: 0    Additional concerns today:  No    Do you feel safe in your environment? No    Have you ever done Advance Care Planning? (For example, a Health Directive, POLST, or a discussion with a medical provider or your loved ones about your wishes): No, advance care planning information given to patient to review.  Patient plans to discuss their wishes with loved ones or provider.         Fall risk  Fallen 2 or more times in the past year?: No  Any fall with injury in the past year?: No    Cognitive Screening   1) Repeat 3 items (Leader, Season, " Table)    2) Clock draw: ABNORMAL wrote number 9 twice  3) 3 item recall: Recalls 3 objects  Results: 3 items recalled: COGNITIVE IMPAIRMENT LESS LIKELY    Mini-CogTM Copyright DARIN Chaparro. Licensed by the author for use in [facility]; reprinted with permission (rachael@[email or web address]). All rights reserved.          Reviewed and updated as needed this visit by clinical staff   Tobacco                  Reviewed and updated as needed this visit by Provider                   Social History     Tobacco Use     Smoking status: Never Smoker     Smokeless tobacco: Never Used   Substance Use Topics     Alcohol use: No     If you drink alcohol do you typically have >3 drinks per day or >7 drinks per week? Not applicable    Alcohol Use 5/2/2022   Prescreen: >3 drinks/day or >7 drinks/week? Not Applicable   Prescreen: >3 drinks/day or >7 drinks/week? -   No flowsheet data found.      Current providers sharing in care for this patient include:   Patient Care Team:  Emmanuelle Woodall MD as PCP - General  Emmanuelle Woodall MD as Assigned PCP  Dejah Sherman MD as Assigned Heart and Vascular Provider  Karmen Borja MD as Assigned Surgical Provider    The following health maintenance items are reviewed in Epic and correct as of today:  Health Maintenance Due   Topic Date Due     DTAP/TDAP/TD IMMUNIZATION (1 - Tdap) 08/09/2017     ZOSTER IMMUNIZATION (3 of 3) 11/03/2020     BP Readings from Last 3 Encounters:   05/02/22 109/75   04/26/22 (!) 146/72   02/23/22 111/77    Wt Readings from Last 3 Encounters:   05/02/22 66.6 kg (146 lb 14.4 oz)   04/26/22 67.2 kg (148 lb 3.2 oz)   02/23/22 67.6 kg (149 lb)                  Patient Active Problem List   Diagnosis     Chronic Constipation     Osteoarthritis Of The Knee     Osteoporosis     Atypical Chest Pain     Seborrheic Dermatitis     Seborrheic Dermatitis Of The Scalp     Itching (Pruritus)     Peripheral Neuropathy     Asymptomatic Coronary  Arteriosclerosis     Hypermagnesemia     Restless Legs Syndrome     Hypertension     Spinal Stenosis     Epistaxis     Cerumen Impaction     Vertigo     Cough     Patellofemoral Syndrome     Onychomycosis Of The Toenails     Chronic cough     Acute bronchitis     Skin tag     Gastroesophageal reflux disease without esophagitis     Pseudotumor (inflammatory) of orbit, right     Right flank pain, chronic     Hammertoe of second toe of right foot     Bilateral hearing loss     Tinnitus, bilateral     Bunion, right     Primary osteoarthritis of right hip     Hallux limitus, right     Past Surgical History:   Procedure Laterality Date     ABDOMEN SURGERY       APPENDECTOMY       BACK SURGERY       CHOLECYSTECTOMY       COSMETIC SURGERY       EYE SURGERY       FRACTURE SURGERY       HC DILATION/CURETTAGE DIAG/THER NON OB      Description: Dilation And Curettage;  Recorded: 02/02/2010;  Comments: due to miscarriage     HC KNEE SCOPE, DIAGNOSTIC      Description: Arthroscopy Knee Right;  Recorded: 02/02/2010;  Comments: right knee - arthroscopy for debridement; then Synvisc     HC REMOVAL GALLBLADDER      Description: Cholecystectomy;  Recorded: 02/02/2010;  Comments: age 30s     LUMBAR FUSION  08/2013       Social History     Tobacco Use     Smoking status: Never Smoker     Smokeless tobacco: Never Used   Substance Use Topics     Alcohol use: No     No family history on file.      Current Outpatient Medications   Medication Sig Dispense Refill     calcium carbonate-vitamin D3 (CALCIUM 600 + D,3,) 600 mg(1,500mg) -200 unit per tablet [CALCIUM CARBONATE-VITAMIN D3 (CALCIUM 600 + D,3,) 600 MG(1,500MG) -200 UNIT PER TABLET] Take 1 tablet by mouth 2 (two) times a day.  0     gabapentin (NEURONTIN) 300 MG capsule TAKE 2 CAPSULES BY MOUTH THREE TIMES DAILY GENERIC EQUIVALENT FOR NEURONTIN 540 capsule 3     glucosamn-msm-C-shady-herbal 21 (GLUCOSAMINE-MSM COMPLEX) Tab [GLUCOSAMN-MSM-C-SHADY-HERBAL 21 (GLUCOSAMINE-MSM COMPLEX) TAB]  "Take 1 tablet by mouth 2 (two) times a day.  0     hydrochlorothiazide (HYDRODIURIL) 25 MG tablet TAKE 1 TABLET BY MOUTH DAILY 90 tablet 2     MULTIVITAMIN (MULTIPLE VITAMIN ORAL) [MULTIVITAMIN (MULTIPLE VITAMIN ORAL)] Take 1 tablet by mouth daily.       nortriptyline (PAMELOR) 75 MG capsule TAKE 1 CAPSULE BY MOUTH AT BEDTIME 90 capsule 2     omeprazole (PRILOSEC) 20 MG DR capsule Take 1 Capsule by mouth daily. Take 1 hour before a meal.       No Known Allergies  Reviewed HM    Mammogram Screening - Patient over age 75, has elected to continue with screening.  Pertinent mammograms are reviewed under the imaging tab.    Review of Systems   Constitutional: Negative for activity change, chills, fever and unexpected weight change.   HENT:        Dry mouth     Eyes:        Cataracts - bilateral - scheduled for surgery in May/June   Respiratory: Negative for cough and shortness of breath.    Cardiovascular: Negative for chest pain and peripheral edema.   Gastrointestinal: Negative.    Endocrine: Negative.    Breasts:  negative.    Genitourinary: Negative.    Musculoskeletal: Negative.    Skin: Negative.    Allergic/Immunologic: Negative.    Neurological: Negative.    Hematological: Negative.    Psychiatric/Behavioral: Negative.          OBJECTIVE:   /75 (BP Location: Left arm, Patient Position: Sitting, Cuff Size: Adult Regular)   Pulse 98   Temp 98  F (36.7  C)   Ht 1.61 m (5' 3.39\")   Wt 66.6 kg (146 lb 14.4 oz)   SpO2 97%   BMI 25.70 kg/m   Estimated body mass index is 25.7 kg/m  as calculated from the following:    Height as of this encounter: 1.61 m (5' 3.39\").    Weight as of this encounter: 66.6 kg (146 lb 14.4 oz).  Physical Exam  Constitutional:       General: She is not in acute distress.     Appearance: She is well-developed.   HENT:      Right Ear: Tympanic membrane and external ear normal.      Left Ear: Tympanic membrane and external ear normal.      Nose: Nose normal.      Mouth/Throat:      " Pharynx: No oropharyngeal exudate.   Eyes:      General:         Right eye: No discharge.         Left eye: No discharge.      Conjunctiva/sclera: Conjunctivae normal.      Pupils: Pupils are equal, round, and reactive to light.   Neck:      Thyroid: No thyromegaly.      Trachea: No tracheal deviation.   Cardiovascular:      Rate and Rhythm: Normal rate and regular rhythm.      Pulses: Normal pulses.      Heart sounds: Normal heart sounds, S1 normal and S2 normal. No murmur heard.    No friction rub. No S3 or S4 sounds.   Pulmonary:      Effort: Pulmonary effort is normal. No respiratory distress.      Breath sounds: Normal breath sounds. No wheezing or rales.   Abdominal:      General: Bowel sounds are normal.      Palpations: Abdomen is soft. There is no mass.      Tenderness: There is no abdominal tenderness.   Musculoskeletal:         General: Normal range of motion.      Cervical back: Neck supple.   Lymphadenopathy:      Cervical: No cervical adenopathy.   Skin:     General: Skin is warm and dry.      Findings: No rash.   Neurological:      General: No focal deficit present.      Mental Status: She is alert and oriented to person, place, and time.      Motor: No abnormal muscle tone.      Deep Tendon Reflexes: Reflexes are normal and symmetric.   Psychiatric:         Thought Content: Thought content normal.         Judgment: Judgment normal.           Diagnostic Test Results:  Labs reviewed in Epic  Results for orders placed or performed in visit on 05/02/22   Vitamin D Deficiency     Status: Normal   Result Value Ref Range    Vitamin D, Total (25-Hydroxy) 62 20 - 75 ug/L    Narrative    Season, race, dietary intake, and treatment affect the concentration of 25-hydroxy-Vitamin D. Values may decrease during winter months and increase during summer months. Values 20-29 ug/L may indicate Vitamin D insufficiency and values <20 ug/L may indicate Vitamin D deficiency.    Vitamin D determination is routinely  "performed by an immunoassay specific for 25 hydroxyvitamin D3.  If an individual is on vitamin D2(ergocalciferol) supplementation, please specify 25 OH vitamin D2 and D3 level determination by LCMSMS test VITD23.     Lipid Profile (Chol, Trig, HDL, LDL calc)     Status: None   Result Value Ref Range    Cholesterol 185 <=199 mg/dL    Triglycerides 131 <=149 mg/dL    Direct Measure HDL 59 >=50 mg/dL    LDL Cholesterol Calculated 100 <=129 mg/dL    Patient Fasting > 8hrs? Yes    Vitamin B12     Status: Normal   Result Value Ref Range    Vitamin B12 438 213 - 816 pg/mL       ASSESSMENT / PLAN:   1. Encounter for Medicare annual wellness exam  This is an 81 yo female here for AWV    2. Peripheral Neuropathy  H/o peripheral neuropathy - check Vitamin B12 level  - Vitamin B12; Future  - Vitamin B12    3. Mixed hyperlipidemia  H/o elevated lipids - needs lipid screening   - Lipid Profile (Chol, Trig, HDL, LDL calc); Future  - Lipid Profile (Chol, Trig, HDL, LDL calc)    4. Vitamin D deficiency  H/o Vitamin D deficiency - check levels   - Vitamin D Deficiency; Future  - Vitamin D Deficiency    5. Need for tetanus booster  Due for tetanus booster - ordered Tdap  - TDAP VACCINE (Adacel, Boostrix)      Patient has been advised of split billing requirements and indicates understanding: Yes    COUNSELING:  Reviewed preventive health counseling, as reflected in patient instructions       Regular exercise       Healthy diet/nutrition    Estimated body mass index is 25.7 kg/m  as calculated from the following:    Height as of this encounter: 1.61 m (5' 3.39\").    Weight as of this encounter: 66.6 kg (146 lb 14.4 oz).        She reports that she has never smoked. She has never used smokeless tobacco.      Appropriate preventive services were discussed with this patient, including applicable screening as appropriate for cardiovascular disease, diabetes, osteopenia/osteoporosis, and glaucoma.  As appropriate for age/gender, discussed " screening for colorectal cancer, prostate cancer, breast cancer, and cervical cancer. Checklist reviewing preventive services available has been given to the patient.    Reviewed patients plan of care and provided an AVS. The Basic Care Plan (routine screening as documented in Health Maintenance) for Deborah meets the Care Plan requirement. This Care Plan has been established and reviewed with the Patient.    Counseling Resources:  ATP IV Guidelines  Pooled Cohorts Equation Calculator  Breast Cancer Risk Calculator  Breast Cancer: Medication to Reduce Risk  FRAX Risk Assessment  ICSI Preventive Guidelines  Dietary Guidelines for Americans, 2010  USDA's MyPlate  ASA Prophylaxis  Lung CA Screening    DANICA LARA MD  [facility]    Identified Health Risks:

## 2022-05-02 NOTE — PATIENT INSTRUCTIONS
You are due for a shingles shot  - shingles is a re-activation of the chicken pox virus in your body.  It can cause a very painful rash.  The rash can get better while the pain continues for some time afterwards (sometimes up to a lifetime of pain from this rash).  We recommend that you protect yourself from this condition with this shot (actually, 2 shots - one now and one in 2-6 months).  These are expensive and sometimes not covered by the insurance company.  It is important to figure out if your insurance company covers the shot ; and if they do cover it, where should you get it?  (sometimes your insurance will cover it only if it is given at the pharmacy, sometimes they will only cover it if it is given at the clinic).  Make sure you know the answer to these questions:    Call your insurance company (number on back of your card) and ask them:  1.  Does my insurance cover the Shingrix (the new shingles shot)? , and if so, then  2.  Do I need to get that at my doctor's office or at my pharmacy - does it matter?

## 2022-05-03 LAB — DEPRECATED CALCIDIOL+CALCIFEROL SERPL-MC: 62 UG/L (ref 20–75)

## 2022-06-27 ENCOUNTER — TELEPHONE (OUTPATIENT)
Dept: FAMILY MEDICINE | Facility: CLINIC | Age: 82
End: 2022-06-27

## 2022-06-27 NOTE — TELEPHONE ENCOUNTER
Travel questionnaire was asked. Verified that they have no signs of COVID-19 symptoms.    Patient dropped off PRESCRIPTION RENEWAL for Dr. Cordero to fill out. Placed the original copies in the 's slot.    When forms are completed, patient would like it:    Fax to [phone] -no copy is needed    Ok to leave a detailed message if unable to get a hold of the Patient.    Please re-route task back to the  to shred the copied forms and complete the task. Thanks!3

## 2022-09-14 ENCOUNTER — OFFICE VISIT (OUTPATIENT)
Dept: FAMILY MEDICINE | Facility: CLINIC | Age: 82
End: 2022-09-14
Payer: COMMERCIAL

## 2022-09-14 VITALS
DIASTOLIC BLOOD PRESSURE: 78 MMHG | HEART RATE: 94 BPM | OXYGEN SATURATION: 98 % | TEMPERATURE: 98 F | WEIGHT: 148 LBS | SYSTOLIC BLOOD PRESSURE: 134 MMHG | BODY MASS INDEX: 25.9 KG/M2

## 2022-09-14 DIAGNOSIS — R15.9 INCONTINENCE OF FECES WITH FECAL URGENCY: Primary | ICD-10-CM

## 2022-09-14 DIAGNOSIS — I10 ESSENTIAL HYPERTENSION: ICD-10-CM

## 2022-09-14 DIAGNOSIS — R15.2 INCONTINENCE OF FECES WITH FECAL URGENCY: Primary | ICD-10-CM

## 2022-09-14 PROCEDURE — 99214 OFFICE O/P EST MOD 30 MIN: CPT | Performed by: FAMILY MEDICINE

## 2022-09-14 RX ORDER — HYDROCHLOROTHIAZIDE 25 MG/1
25 TABLET ORAL DAILY
Qty: 90 TABLET | Refills: 2 | Status: SHIPPED | OUTPATIENT
Start: 2022-09-14 | End: 2023-09-11

## 2022-09-14 NOTE — PROGRESS NOTES
"  1. Incontinence of feces with fecal urgency  This is an 81 yo female with reports of chronic intermittent abdominal cramping/loose stools.  This makes her very nervous, because generally the urgency brings on incontinence of stool.  She is embarrassed by this - and wants to know what else to do.  She tells me she didn't use the Miralax I recommended (which is good because I recommended Imodium).  But - we discussed the difficulty in this situation of becoming more and more isolative in behavior.  Needs to do something about this - we discussed fiber/bulk, we discussed Imodium for travel.  Consider probiotics.  Will have patient disucss with GI consult.    - Adult GI  Referral - Consult Only; Future    2. Essential hypertension  Blood pressure is currently controlled - continue hydrochlorothiazide - needs refill.   - hydrochlorothiazide (HYDRODIURIL) 25 MG tablet; Take 1 tablet (25 mg) by mouth daily  Dispense: 90 tablet; Refill: 2      Probiotics ?  Imodium?      Subjective   Deborah is a 82 year old, presenting for the following health issues:  Bowel Problems and Medication Refill      Went to [place] - 10 days   Had to be super careful about getting to bathroom  Came back to [place], [place] -   Got [facility] home - had urgency -   Went in to restroom without shoes on -   Hard to be out without bathrooms      Since home, in regular routine, has been so much better    Was a nervous wreck whole time she was gone -     Summer - eats a lot of fruits/vegetables  Cucumbers/tomatoes - salsa  Knows she needs more water -   Drinks more    Worries more and more about when it's going to happen     Eye:   April 4 - Brittani -   Had surgery 5/17 -   Still hasn't gotten her glasses -   Had cataract surgery - \"didn't get it all\"  Then, Dr. Johnson did another surgery  Saw him back - thought it was fine to get glasses -     Couldn't see out of her glasses -               History of Present Illness       Reason for visit:  " Follow up on previous visit    She eats 2-3 servings of fruits and vegetables daily.She consumes 2 sweetened beverage(s) daily.She exercises with enough effort to increase her heart rate 9 or less minutes per day.  She exercises with enough effort to increase her heart rate 3 or less days per week.   She is taking medications regularly.             Review of Systems   Constitutional: Negative for chills, fatigue, fever and unexpected weight change.   HENT:        Dry mouth   Eyes: Negative for visual disturbance.   Respiratory: Negative for cough and shortness of breath.    Cardiovascular: Negative.  Negative for chest pain and peripheral edema.   Gastrointestinal: Positive for diarrhea (with urgency, incontinence).   Endocrine: Negative for polydipsia and polyuria.   Genitourinary: Negative.    Musculoskeletal: Negative.    Skin: Negative.    Allergic/Immunologic: Negative.    Neurological: Negative.    Hematological: Negative.    Psychiatric/Behavioral: Negative.             Objective    /78 (BP Location: Right arm, Patient Position: Sitting, Cuff Size: Adult Regular)   Pulse 94   Temp 98  F (36.7  C)   Wt 67.1 kg (148 lb)   SpO2 98%   BMI 25.90 kg/m    Body mass index is 25.9 kg/m .  Physical Exam  Vitals reviewed.   Constitutional:       General: She is not in acute distress.     Appearance: She is well-developed.   HENT:      Right Ear: Tympanic membrane and external ear normal.      Left Ear: Tympanic membrane and external ear normal.      Nose: Nose normal.      Mouth/Throat:      Pharynx: No oropharyngeal exudate.   Eyes:      General:         Right eye: No discharge.         Left eye: No discharge.      Conjunctiva/sclera: Conjunctivae normal.      Pupils: Pupils are equal, round, and reactive to light.   Neck:      Thyroid: No thyromegaly.      Trachea: No tracheal deviation.   Cardiovascular:      Rate and Rhythm: Normal rate and regular rhythm.      Pulses: Normal pulses.      Heart sounds:  Normal heart sounds, S1 normal and S2 normal. No murmur heard.    No friction rub. No S3 or S4 sounds.   Pulmonary:      Effort: Pulmonary effort is normal. No respiratory distress.      Breath sounds: Normal breath sounds. No wheezing or rales.   Abdominal:      General: Bowel sounds are normal.      Palpations: Abdomen is soft. There is no mass.      Tenderness: There is no abdominal tenderness.   Musculoskeletal:         General: Normal range of motion.      Cervical back: Neck supple.   Lymphadenopathy:      Cervical: No cervical adenopathy.   Skin:     General: Skin is warm and dry.      Findings: No rash.   Neurological:      Mental Status: She is alert and oriented to person, place, and time.      Motor: No abnormal muscle tone.      Deep Tendon Reflexes: Reflexes are normal and symmetric.   Psychiatric:         Thought Content: Thought content normal.         Judgment: Judgment normal.            No results found for any visits on 09/14/22.

## 2022-09-18 ASSESSMENT — ENCOUNTER SYMPTOMS
POLYDIPSIA: 0
COUGH: 0
FATIGUE: 0
DIARRHEA: 1
HEMATOLOGIC/LYMPHATIC NEGATIVE: 1
NEUROLOGICAL NEGATIVE: 1
MUSCULOSKELETAL NEGATIVE: 1
CARDIOVASCULAR NEGATIVE: 1
SHORTNESS OF BREATH: 0
CHILLS: 0
PSYCHIATRIC NEGATIVE: 1
UNEXPECTED WEIGHT CHANGE: 0
ALLERGIC/IMMUNOLOGIC NEGATIVE: 1
FEVER: 0

## 2022-09-19 ENCOUNTER — TELEPHONE (OUTPATIENT)
Dept: FAMILY MEDICINE | Facility: CLINIC | Age: 82
End: 2022-09-19

## 2022-09-19 NOTE — TELEPHONE ENCOUNTER
General Call    Contacts       Type Contact Phone/Fax    09/19/2022 10:38 AM CDT Phone (Incoming) Oj Mireles (Emergency Contact) [phone]        Reason for Call:  GI referral/order    What are your questions or concerns:  Wants to let provider know they didn't get a call. TC provided  scheduling number [phone].    Date of last appointment with provider: 9/14/22    Could we send this information to you in [employer] or would you prefer to receive a phone call?:   No preference   Okay to leave a detailed message?: Yes at Cell number on file:    Telephone Information:   Mobile [phone]

## 2022-09-30 ENCOUNTER — OFFICE VISIT (OUTPATIENT)
Dept: FAMILY MEDICINE | Facility: CLINIC | Age: 82
End: 2022-09-30
Payer: COMMERCIAL

## 2022-09-30 VITALS
DIASTOLIC BLOOD PRESSURE: 67 MMHG | BODY MASS INDEX: 26.4 KG/M2 | SYSTOLIC BLOOD PRESSURE: 114 MMHG | WEIGHT: 149 LBS | HEIGHT: 63 IN | HEART RATE: 97 BPM

## 2022-09-30 DIAGNOSIS — H26.9 CATARACT, UNSPECIFIED CATARACT TYPE, UNSPECIFIED LATERALITY: ICD-10-CM

## 2022-09-30 DIAGNOSIS — Z23 IMMUNIZATION DUE: ICD-10-CM

## 2022-09-30 DIAGNOSIS — Z01.818 PREOP GENERAL PHYSICAL EXAM: Primary | ICD-10-CM

## 2022-09-30 LAB
ANION GAP SERPL CALCULATED.3IONS-SCNC: 12 MMOL/L (ref 7–15)
BUN SERPL-MCNC: 12.1 MG/DL (ref 8–23)
CALCIUM SERPL-MCNC: 9.7 MG/DL (ref 8.8–10.2)
CHLORIDE SERPL-SCNC: 99 MMOL/L (ref 98–107)
CREAT SERPL-MCNC: 0.88 MG/DL (ref 0.51–0.95)
DEPRECATED HCO3 PLAS-SCNC: 26 MMOL/L (ref 22–29)
GFR SERPL CREATININE-BSD FRML MDRD: 66 ML/MIN/1.73M2
GLUCOSE SERPL-MCNC: 99 MG/DL (ref 70–99)
POTASSIUM SERPL-SCNC: 4.1 MMOL/L (ref 3.4–5.3)
SODIUM SERPL-SCNC: 137 MMOL/L (ref 136–145)

## 2022-09-30 PROCEDURE — 90662 IIV NO PRSV INCREASED AG IM: CPT | Performed by: FAMILY MEDICINE

## 2022-09-30 PROCEDURE — 36415 COLL VENOUS BLD VENIPUNCTURE: CPT | Performed by: FAMILY MEDICINE

## 2022-09-30 PROCEDURE — 80048 BASIC METABOLIC PNL TOTAL CA: CPT | Performed by: FAMILY MEDICINE

## 2022-09-30 PROCEDURE — 99214 OFFICE O/P EST MOD 30 MIN: CPT | Mod: 25 | Performed by: FAMILY MEDICINE

## 2022-09-30 PROCEDURE — G0008 ADMIN INFLUENZA VIRUS VAC: HCPCS | Performed by: FAMILY MEDICINE

## 2022-09-30 RX ORDER — PREDNISOLONE ACETATE 10 MG/ML
SUSPENSION/ DROPS OPHTHALMIC
COMMUNITY
Start: 2022-09-27 | End: 2023-04-03

## 2022-09-30 NOTE — Clinical Note
Sorry if this is duplicated (distracted while working). Please fax preop to surgical team. Thanks! Alison Chavira, DO

## 2022-09-30 NOTE — PROGRESS NOTES
[facility]  [address] [facility] [address]  Phone: [phone]  Fax: [phone]  Primary Provider: Emmanuelle Woodall  Pre-op Performing Provider: ANAND QUICK      PREOPERATIVE EVALUATION:  Today's date: 9/30/2022    Deborah Mireles is a 82 year old female who presents for a preoperative evaluation.    Surgical Information:  Surgery/Procedure: PHACO IOL - Right eye  Surgery Location: [facility]  Surgeon: Dr. Jane  Surgery Date: 10/12/22  Time of Surgery: TBD  Where patient plans to recover: At home with family  Fax number for surgical facility: [phone]    Type of Anesthesia Anticipated: MAC    Assessment & Plan     The proposed surgical procedure is considered LOW risk.    1. Preop general physical exam  2. Cataract, unspecified cataract type, unspecified laterality  - Basic metabolic panel  (Ca, Cl, CO2, Creat, Gluc, K, Na, BUN)      Risks and Recommendations:  The patient has the following additional risks and recommendations for perioperative complications:   - No identified additional risk factors other than previously addressed    Medication Instructions:  Patient is to take all scheduled medications on the day of surgery    RECOMMENDATION:  APPROVAL GIVEN to proceed with proposed procedure, without further diagnostic evaluation.    3. Immunization due  - INFLUENZA, QUAD, HIGH DOSE, PF, 65YR + (FLUZONE HD)        Subjective     HPI related to upcoming procedure: Deborah is scheduled for right eye cataract surgery.      Preop Questions 9/30/2022   1. Have you ever had a heart attack or stroke? No   2. Have you ever had surgery on your heart or blood vessels, such as a stent placement, a coronary artery bypass, or surgery on an artery in your head, neck, heart, or legs? No   3. Do you have chest pain with activity? No   4. Do you have a history of  heart failure? No   5. Do you currently have a cold, bronchitis or symptoms of  other infection? No   6. Do you have a cough, shortness of breath, or wheezing? No   7. Do you or anyone in your family have previous history of blood clots? No   8. Do you or does anyone in your family have a serious bleeding problem such as prolonged bleeding following surgeries or cuts? No   9. Have you ever had problems with anemia or been told to take iron pills? No   10. Have you had any abnormal blood loss such as black, tarry or bloody stools, or abnormal vaginal bleeding? No   11. Have you ever had a blood transfusion? No   12. Are you willing to have a blood transfusion if it is medically needed before, during, or after your surgery? Yes   13. Have you or any of your relatives ever had problems with anesthesia? UNKNOWN - Deborah has had many surgeries and has done well with all surgeries.    14. Do you have sleep apnea, excessive snoring or daytime drowsiness? YES    14a. Do you have a CPAP machine? No   15. Do you have any artifical heart valves or other implanted medical devices like a pacemaker, defibrillator, or continuous glucose monitor? No   16. Do you have artificial joints? YES - 1 year out from right hip   17. Are you allergic to latex? No       Preoperative Review of :   reviewed - controlled substances reflected in medication list.      Status of Chronic Conditions:  See problem list for active medical problems.  Problems all longstanding and stable, except as noted/documented.  See ROS for pertinent symptoms related to these conditions.      Review of Systems  CONSTITUTIONAL: NEGATIVE for fever, chills, change in weight  ENT/MOUTH: NEGATIVE for ear, mouth and throat problems  RESP: NEGATIVE for significant cough or SOB  CV: NEGATIVE for chest pain, palpitations or peripheral edema    Patient Active Problem List    Diagnosis Date Noted     Primary osteoarthritis of right hip 08/31/2021     Priority: Medium     Formatting of this note might be different from the original.  Added  automatically from request for surgery [id number]    Formatting of this note might be different from the original.  Added automatically from request for surgery [id number]       Hallux limitus, right 11/02/2020     Priority: Medium     Formatting of this note might be different from the original.  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       Osteoporosis      Priority: Medium     Created by Conversion  Replacement Utility updated for latest IMO load         Itching (Pruritus)      Priority: Medium     Created by Conversion  Replacement Utility updated for latest IMO load         Peripheral Neuropathy      Priority: Medium     Created by Conversion  [facility] Annotation: Jan 31 2009  4:00PM - Chance Hussein: unclear etiology  Replacement Utility updated for latest IMO load         Asymptomatic Coronary Arteriosclerosis      Priority: Medium     Created by Conversion  Replacement Utility updated for latest IMO load         Hypermagnesemia      Priority: Medium     Created by Conversion  Replacement Utility updated for latest IMO load         Hypertension      Priority: Medium     Created by  Conversion  Replacement Utility updated for latest IMO load         Gastroesophageal reflux disease without esophagitis 02/18/2016     Priority: Medium     Skin tag 12/25/2015     Priority: Medium     Acute bronchitis 09/14/2015     Priority: Medium     Chronic cough 01/05/2015     Priority: Medium     Restless Legs Syndrome      Priority: Medium     Created by Conversion         Onychomycosis Of The Toenails      Priority: Medium     Created by Conversion         Atypical Chest Pain      Priority: Medium     Created by Conversion         Seborrheic Dermatitis      Priority: Medium     Created by Conversion         Seborrheic Dermatitis Of The Scalp      Priority: Medium     Created by Conversion         Spinal Stenosis      Priority: Medium     Created by Conversion         Epistaxis      Priority: Medium     Created by Conversion         Cerumen Impaction      Priority: Medium     Created by Conversion         Vertigo      Priority: Medium     Created by Conversion         Cough      Priority: Medium     Created by Conversion         Patellofemoral Syndrome      Priority: Medium     Created by Conversion  [facility] Annotation: Jan 31 2009  4:00PM - Emmanuelle Woodall:   steroid injection per Dr. Mattson          Past Medical History:   Diagnosis Date     Constipation      Coronary atherosclerosis      Dizziness and giddiness      Epistaxis      Essential hypertension      Gastroesophageal reflux disease without esophagitis 2/18/2016     Hereditary and idiopathic peripheral neuropathy      Impacted cerumen      Osteoarthrosis involving lower leg      Osteoporosis      Pain in joint, lower leg      Pseudotumor (inflammatory) of orbit, 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     Restless legs syndrome (RLS)      Seborrheic dermatitis, unspecified      Spinal stenosis, unspecified region other than cervical      Past Surgical History:   Procedure Laterality Date     ABDOMEN SURGERY       APPENDECTOMY       BACK SURGERY        "CHOLECYSTECTOMY       COSMETIC SURGERY       EYE SURGERY       FRACTURE SURGERY       HC DILATION/CURETTAGE DIAG/THER NON OB      Description: Dilation And Curettage;  Recorded: 02/02/2010;  Comments: due to miscarriage     HC KNEE SCOPE, DIAGNOSTIC      Description: Arthroscopy Knee Right;  Recorded: 02/02/2010;  Comments: right knee - arthroscopy for debridement; then Synvisc     HC REMOVAL GALLBLADDER      Description: Cholecystectomy;  Recorded: 02/02/2010;  Comments: age 30s     LUMBAR FUSION  08/2013     Current Outpatient Medications   Medication Sig Dispense Refill     calcium carbonate-vitamin D3 (CALCIUM 600 + D,3,) 600 mg(1,500mg) -200 unit per tablet [CALCIUM CARBONATE-VITAMIN D3 (CALCIUM 600 + D,3,) 600 MG(1,500MG) -200 UNIT PER TABLET] Take 1 tablet by mouth 2 (two) times a day.  0     gabapentin (NEURONTIN) 300 MG capsule TAKE 2 CAPSULES BY MOUTH THREE TIMES DAILY GENERIC EQUIVALENT FOR NEURONTIN 540 capsule 3     glucosamn-msm-C-shady-herbal 21 (GLUCOSAMINE-MSM COMPLEX) Tab [GLUCOSAMN-MSM-C-SHADY-HERBAL 21 (GLUCOSAMINE-MSM COMPLEX) TAB] Take 1 tablet by mouth 2 (two) times a day.  0     hydrochlorothiazide (HYDRODIURIL) 25 MG tablet Take 1 tablet (25 mg) by mouth daily 90 tablet 2     MULTIVITAMIN (MULTIPLE VITAMIN ORAL) [MULTIVITAMIN (MULTIPLE VITAMIN ORAL)] Take 1 tablet by mouth daily.       nortriptyline (PAMELOR) 75 MG capsule TAKE 1 CAPSULE BY MOUTH AT BEDTIME 90 capsule 2     omeprazole (PRILOSEC) 20 MG DR capsule Take 1 Capsule by mouth daily. Take 1 hour before a meal.         No Known Allergies     Social History     Tobacco Use     Smoking status: Never Smoker     Smokeless tobacco: Never Used   Substance Use Topics     Alcohol use: No     History reviewed. No pertinent family history.  History   Drug Use No         Objective     /67 (BP Location: Left arm, Patient Position: Sitting, Cuff Size: Adult Regular)   Pulse 97   Ht 1.61 m (5' 3.39\")   Wt 67.6 kg (149 lb)   BMI 26.07 kg/m  "     Physical Exam  GENERAL APPEARANCE: healthy, alert and no distress  HENT: ear canals and TM's normal and nose and mouth without ulcers or lesions  RESP: lungs clear to auscultation - no rales, rhonchi or wheezes  CV: regular rate and rhythm, normal S1 S2, no S3 or S4 and no murmur, click or rub   ABDOMEN: soft, nontender, no HSM or masses and bowel sounds normal  NEURO: Normal strength and tone, sensory exam grossly normal, mentation intact and speech normal    Recent Labs   Lab Test 04/26/22  1600 09/24/21  1208 11/09/20  1153   HGB  --  14.5 13.2    141  --    POTASSIUM 3.9 3.7 3.9   CR 0.83 0.87  --         Diagnostics:    Recent Results (from the past 24 hour(s))   Basic metabolic panel  (Ca, Cl, CO2, Creat, Gluc, K, Na, BUN)    Collection Time: 09/30/22  1:45 PM   Result Value Ref Range    Sodium 137 136 - 145 mmol/L    Potassium 4.1 3.4 - 5.3 mmol/L    Chloride 99 98 - 107 mmol/L    Carbon Dioxide (CO2) 26 22 - 29 mmol/L    Anion Gap 12 7 - 15 mmol/L    Urea Nitrogen 12.1 8.0 - 23.0 mg/dL    Creatinine 0.88 0.51 - 0.95 mg/dL    Calcium 9.7 8.8 - 10.2 mg/dL    Glucose 99 70 - 99 mg/dL    GFR Estimate 66 >60 mL/min/1.73m2        No EKG required for low risk surgery (cataract, skin procedure, breast biopsy, etc).    Revised Cardiac Risk Index (RCRI):  The patient has the following serious cardiovascular risks for perioperative complications:   - No serious cardiac risks = 0 points     RCRI Interpretation: 0 points: Class I (very low risk - 0.4% complication rate)           Signed Electronically by: Alison Chavira DO  Copy of this evaluation report is provided to requesting physician.

## 2022-09-30 NOTE — PATIENT INSTRUCTIONS
Preparing for Your Surgery  Getting started  A nurse will call you to review your health history and instructions. They will give you an arrival time based on your scheduled surgery time. Please be ready to share:    Your doctor's clinic name and phone number    Your medical, surgical and anesthesia history    A list of allergies and sensitivities    A list of medicines, including herbal treatments and over-the-counter drugs    Whether the patient has a legal guardian (ask how to send us the papers in advance)  Please tell us if you're pregnant--or if there's any chance you might be pregnant. Some surgeries may injure a fetus (unborn baby), so they require a pregnancy test. Surgeries that are safe for a fetus don't always need a test, and you can choose whether to have one.   If you have a child who's having surgery, please ask for a copy of Preparing for Your Child's Surgery.    Preparing for surgery    Within 10 to 30 days of surgery: Have a pre-op exam (sometimes called an H&P, or History and Physical). This can be done at a clinic or pre-operative center.  ? If you're having a , you may not need this exam. Talk to your care team.    At your pre-op exam, talk to your care team about all medicines you take. If you need to stop any medicines before surgery, ask when to start taking them again.  ? We do this for your safety. Many medicines can make you bleed too much during surgery. Some change how well surgery (anesthesia) drugs work.    Call your insurance company to let them know you're having surgery. (If you don't have insurance, call [phone].)    Call your clinic if there's any change in your health. This includes signs of a cold or flu (sore throat, runny nose, cough, rash, fever). It also includes a scrape or scratch near the surgery site.    If you have questions on the day of surgery, call your hospital or surgery center.  COVID testing  You may need to be tested for COVID-19 before having  surgery. If so, we will give you instructions (or click here).  Eating and drinking guidelines  For your safety: Unless your surgeon tells you otherwise, follow the guidelines below.    Eat and drink as usual until 8 hours before surgery. After that, no food or milk.    Drink clear liquids until 2 hours before surgery. These are liquids you can see through, like water, Gatorade and Propel Water. You may also have black coffee and tea (no cream or milk).    Nothing by mouth within 2 hours of surgery. This includes gum, candy and breath mints.    If you drink alcohol: Stop drinking it the night before surgery.    If your care team tells you to take medicine on the morning of surgery, it's okay to take it with a sip of water.  Preventing infection    Shower or bathe the night before and morning of your surgery. Follow the instructions your clinic gave you. (If no instructions, use regular soap.)    Don't shave or clip hair near your surgery site. We'll remove the hair if needed.    Don't smoke or vape the morning of surgery. You may chew nicotine gum up to 2 hours before surgery. A nicotine patch is okay.  ? Note: Some surgeries require you to completely quit smoking and nicotine. Check with your surgeon.    Your care team will make every effort to keep you safe from infection. We will:  ? Clean our hands often with soap and water (or an alcohol-based hand rub).  ? Clean the skin at your surgery site with a special soap that kills germs.  ? Give you a special gown to keep you warm. (Cold raises the risk of infection.)  ? Wear special hair covers, masks, gowns and gloves during surgery.  ? Give antibiotic medicine, if prescribed. Not all surgeries need antibiotics.  What to bring on the day of surgery    Photo ID and insurance card    Copy of your health care directive, if you have one    Glasses and hearing aides (bring cases)  ? You can't wear contacts during surgery    Inhaler and eye drops, if you use them (tell us  about these when you arrive)    CPAP machine or breathing device, if you use them    A few personal items, if spending the night    If you have . . .  ? A pacemaker, ICD (cardiac defibrillator) or other implant: Bring the ID card.  ? An implanted stimulator: Bring the remote control.  ? A legal guardian: Bring a copy of the certified (court-stamped) guardianship papers.  Please remove any jewelry, including body piercings. Leave jewelry and other valuables at home.  If you're going home the day of surgery    You must have a responsible adult drive you home. They should stay with you overnight as well.    If you don't have someone to stay with you, and you aren't safe to go home alone, we may keep you overnight. Insurance often won't pay for this.  After surgery  If it's hard to control your pain or you need more pain medicine, please call your surgeon's office.  Questions?   If you have any questions for your care team, list them here: _________________________________________________________________________________________________________________________________________________________________________ ____________________________________ ____________________________________ ____________________________________  For informational purposes only. Not to replace the advice of your health care provider. Copyright   2003, 2019 [facility]. All rights reserved. Clinically reviewed by Fiona Owens MD. [employer] [id number] - REV 07/22.    Before Your Procedure or Hospital Admission  Testing for COVID-19  Thank you for choosing [facility] for your health care needs. Our goal is to keep you and our team here at [facility] safe and healthy. We've taken many steps to make this happen. For example:    We test and screen our staff, care teams and patients for COVID-19.    Everyone at [facility] must wear a mask.    We are limiting hospital and clinic visitors.  Before you come in, you must get tested  for COVID-19, even if you've been vaccinated.   What kind of COVID-19 test should I have?  At-home rapid antigen test    You must have a rapid-antigen test 1 to 2 days before your procedure (surgery) if you are:  ? Over age 2 and  ? Planning to go home the same day as your procedure (surgery)    Rapid antigen tests are not recommended for children age 2 and under. These patients should schedule a PCR test unless you have otherwise discussed performing an at-home antigen test with your surgeon.    You can buy this test at many pharmacy stores. Or, you may order a free test at covid.gov/tests.    If your test is negative: please take a photo of the test. Show the nurse the photo when you come in for your procedure. We can't accept rapid antigen tests that are more than 2 days old.  PCR lab test     You must have a PCR test in a lab within 4 days of your procedure (surgery) if you are:  ? Age 2 or under (unless your surgeon gives you different instructions).  ? Planning to stay overnight in the hospital  ? Unable to find an at-home test.    While you don't have to use our lab, we recommend it. You can get your results more quickly and easily this way. To schedule a test at an [facility] lab, please call [phone]. Or, visit [email or web address].    If you have your test somewhere else, ask the testing location to fax your results to [phone].  ? If we don't get your results within 4 days of your procedure (surgery), we may have to delay or cancel your treatment.  ? We can't accept PCR tests that are more than 4 days old.  If your test shows you have COVID-19  If your test is positive, tell your surgeon's office right away. A positive test means that you have COVID-19.  We'll probably have to postpone your admission, surgery or procedure. Your care team will discuss this with you. We'll let you know what to do and when you can reschedule.  If your test shows you DON'T have COVID-19  A  "negative test result means you don't have COVID-19, but it is still possible that you might get it. It's rare, but sometimes the test result is wrong. Or, you could catch the virus after taking the test. There is also a very small chance that you could catch COVID-19 in the hospital or surgery center. [facility] has taken many steps to prevent this from happening.   To reduce your risk of getting COVID-19 before your procedure (surgery), follow the precautions below.  COVID-19 precautions  After your test and before your procedure, please follow these safety guidelines:    Limit trips outside your home.    Limit the number of people you see.    Always wear a face covering outside your home.    Use social distancing. Stay 6 feet away from others whenever you can.    Wash your hands often.  Possible surgery delay   Like you, we want your surgery to happen when it's scheduled. But sometimes the hospital is so full that it's not safe for you to have your surgery. This is especially true during the pandemic. Your surgery may need to be rescheduled to a later date. If this happens, we will call and tell you.   Day of your surgery or procedure    Please come wearing a face covering that covers both your nose and mouth.    When you arrive, we'll ask you some questions to find out if you've had any exposures to COVID-19 or have any signs of COVID-19.    Ask your care team if you can have visitors. All visitors must wear face coverings and will be screened for exposure to, or signs of, COVID-19.  ? The rules for visitors change often, depending on how much the virus is spreading. To learn more, see \"Visiting a Loved One in the Hospital during the COVID-19 Outbreak,\" at: www.[email or web address]/398601.pdf.   Please call your care team, hospital or surgery center if you have any questions. We thank you for your understanding and for choosing [facility] for your care.     For informational purposes only. Not to replace the " advice of your health care provider. Copyright   2020 [facility]. All rights reserved. Clinically reviewed by Infection Prevention and the [facility] COVID-19 Clinical Team. [employer] 446303 - Rev 08/01/22.    How to Take Your Medication Before Surgery  - Take all of your medications before surgery as usual

## 2022-10-01 NOTE — RESULT ENCOUNTER NOTE
Patient updated by [facility] message with lab results.      Natasha Cabrera,  Your labs have returned and look great. We will forward your preop to your surgical team.  Alison Chavira, DO

## 2022-10-07 ENCOUNTER — DOCUMENTATION ONLY (OUTPATIENT)
Dept: OTHER | Facility: CLINIC | Age: 82
End: 2022-10-07

## 2022-10-07 ENCOUNTER — LAB (OUTPATIENT)
Dept: FAMILY MEDICINE | Facility: CLINIC | Age: 82
End: 2022-10-07
Payer: COMMERCIAL

## 2022-10-07 DIAGNOSIS — Z20.822 ENCOUNTER FOR LABORATORY TESTING FOR COVID-19 VIRUS: ICD-10-CM

## 2022-10-07 PROCEDURE — U0005 INFEC AGEN DETEC AMPLI PROBE: HCPCS

## 2022-10-07 PROCEDURE — U0003 INFECTIOUS AGENT DETECTION BY NUCLEIC ACID (DNA OR RNA); SEVERE ACUTE RESPIRATORY SYNDROME CORONAVIRUS 2 (SARS-COV-2) (CORONAVIRUS DISEASE [COVID-19]), AMPLIFIED PROBE TECHNIQUE, MAKING USE OF HIGH THROUGHPUT TECHNOLOGIES AS DESCRIBED BY CMS-2020-01-R: HCPCS

## 2022-10-08 LAB — SARS-COV-2 RNA RESP QL NAA+PROBE: NEGATIVE

## 2022-10-27 DIAGNOSIS — G60.9 HEREDITARY AND IDIOPATHIC PERIPHERAL NEUROPATHY: ICD-10-CM

## 2022-10-27 DIAGNOSIS — I10 ESSENTIAL HYPERTENSION: ICD-10-CM

## 2022-10-28 RX ORDER — HYDROCHLOROTHIAZIDE 25 MG/1
TABLET ORAL
Qty: 90 TABLET | Refills: 2 | OUTPATIENT
Start: 2022-10-28

## 2022-10-28 RX ORDER — NORTRIPTYLINE HYDROCHLORIDE 75 MG/1
75 CAPSULE ORAL AT BEDTIME
Qty: 90 CAPSULE | Refills: 3 | Status: SHIPPED | OUTPATIENT
Start: 2022-10-28 | End: 2023-09-25

## 2022-10-28 NOTE — TELEPHONE ENCOUNTER
"Routing refill request to provider for review/approval because:  High dose warning.    Last Written Prescription Date:  12/25/21  Last Fill Quantity: 90,  # refills: 2   Last office visit provider:  9/30/22     Requested Prescriptions   Pending Prescriptions Disp Refills     nortriptyline (PAMELOR) 75 MG capsule [Pharmacy Med Name: NORTRIPTYLINE 75MG CAPSULES] 90 capsule 2     Sig: TAKE ONE CAPSULE BY MOUTH AT BEDTIME       Tricyclic Agents ( Annual appt and no PHQ9) Passed - 10/27/2022 10:47 AM        Passed - Blood Pressure under 140/90 in past 12 mos     BP Readings from Last 3 Encounters:   09/30/22 114/67   09/14/22 134/78   05/02/22 109/75                 Passed - Recent (12 mo) or future (30 days) visit within authorizing provider's specialty     Patient has had an office visit with the authorizing provider or a provider within the authorizing providers department within the previous 12 mos or has a future within next 30 days. See \"Patient Info\" tab in inbasket, or \"Choose Columns\" in Meds & Orders section of the refill encounter.              Passed - Medication is active on med list        Passed - Patient is age 18 or older        Passed - Patient is not pregnant        Passed - No positive pregnancy test on record in past 12 mos         Refused Prescriptions Disp Refills     hydrochlorothiazide (HYDRODIURIL) 25 MG tablet [Pharmacy Med Name: HYDROCHLOROTHIAZIDE 25MG TABLETS] 90 tablet 2     Sig: TAKE 1 TABLET BY MOUTH DAILY       Diuretics (Including Combos) Protocol Passed - 10/28/2022 10:57 AM        Passed - Blood pressure under 140/90 in past 12 months     BP Readings from Last 3 Encounters:   09/30/22 114/67   09/14/22 134/78   05/02/22 109/75                 Passed - Recent (12 mo) or future (30 days) visit within the authorizing provider's specialty     Patient has had an office visit with the authorizing provider or a provider within the authorizing providers department within the previous 12 mos " "or has a future within next 30 days. See \"Patient Info\" tab in inbasket, or \"Choose Columns\" in Meds & Orders section of the refill encounter.              Passed - Medication is active on med list        Passed - Patient is age 18 or older        Passed - No active pregancy on record        Passed - Normal serum creatinine on file in past 12 months     Recent Labs   Lab Test 09/30/22  1345   CR 0.88              Passed - Normal serum potassium on file in past 12 months     Recent Labs   Lab Test 09/30/22  1345   POTASSIUM 4.1                    Passed - Normal serum sodium on file in past 12 months     Recent Labs   Lab Test 09/30/22  1345                 Passed - No positive pregnancy test in past 12 months             Nacho Scanlon RN 10/28/22 10:57 AM  "

## 2022-11-10 DIAGNOSIS — I10 ESSENTIAL HYPERTENSION: ICD-10-CM

## 2022-11-12 RX ORDER — HYDROCHLOROTHIAZIDE 25 MG/1
TABLET ORAL
Qty: 90 TABLET | Refills: 2 | OUTPATIENT
Start: 2022-11-12

## 2022-11-20 DIAGNOSIS — I10 ESSENTIAL HYPERTENSION: ICD-10-CM

## 2022-11-21 DIAGNOSIS — I10 ESSENTIAL HYPERTENSION: ICD-10-CM

## 2022-11-21 RX ORDER — HYDROCHLOROTHIAZIDE 25 MG/1
TABLET ORAL
Qty: 90 TABLET | Refills: 2 | OUTPATIENT
Start: 2022-11-21

## 2022-11-22 RX ORDER — HYDROCHLOROTHIAZIDE 25 MG/1
TABLET ORAL
Qty: 90 TABLET | Refills: 2 | OUTPATIENT
Start: 2022-11-22

## 2022-12-30 ENCOUNTER — TELEPHONE (OUTPATIENT)
Dept: FAMILY MEDICINE | Facility: CLINIC | Age: 82
End: 2022-12-30

## 2023-01-13 ENCOUNTER — OFFICE VISIT (OUTPATIENT)
Dept: FAMILY MEDICINE | Facility: CLINIC | Age: 83
End: 2023-01-13
Payer: COMMERCIAL

## 2023-01-13 VITALS
DIASTOLIC BLOOD PRESSURE: 83 MMHG | WEIGHT: 149 LBS | TEMPERATURE: 97.5 F | HEART RATE: 95 BPM | BODY MASS INDEX: 26.07 KG/M2 | SYSTOLIC BLOOD PRESSURE: 149 MMHG | RESPIRATION RATE: 18 BRPM | OXYGEN SATURATION: 98 %

## 2023-01-13 DIAGNOSIS — R05.3 CHRONIC COUGH: Primary | ICD-10-CM

## 2023-01-13 PROCEDURE — 99213 OFFICE O/P EST LOW 20 MIN: CPT | Performed by: FAMILY MEDICINE

## 2023-01-13 RX ORDER — BENZONATATE 200 MG/1
200 CAPSULE ORAL 3 TIMES DAILY PRN
Qty: 30 CAPSULE | Refills: 0 | Status: SHIPPED | OUTPATIENT
Start: 2023-01-13 | End: 2023-04-03

## 2023-01-13 RX ORDER — AZITHROMYCIN 250 MG/1
TABLET, FILM COATED ORAL
Qty: 6 TABLET | Refills: 0 | Status: SHIPPED | OUTPATIENT
Start: 2023-01-13 | End: 2023-01-18

## 2023-01-13 RX ORDER — PREDNISONE 20 MG/1
TABLET ORAL
Qty: 15 TABLET | Refills: 0 | Status: SHIPPED | OUTPATIENT
Start: 2023-01-13 | End: 2023-01-23

## 2023-01-13 RX ORDER — BENZONATATE 200 MG/1
1 CAPSULE ORAL 3 TIMES DAILY PRN
COMMUNITY
Start: 2022-12-12 | End: 2023-04-03

## 2023-01-13 RX ORDER — DOXYCYCLINE 100 MG/1
1 CAPSULE ORAL 2 TIMES DAILY
COMMUNITY
Start: 2022-12-12 | End: 2023-04-03

## 2023-01-13 ASSESSMENT — ENCOUNTER SYMPTOMS
COUGH: 1
HEADACHES: 1

## 2023-01-13 NOTE — PROGRESS NOTES
"1. Chronic cough  This is an 82 yo female with chronic cough - no clear etiology; may be post infectious, may be GE reflux, may be viral infection - will treat with steroid, antibiotics, cough suppressant.  Discussed with patient.  If not improving considerably, need to be seen.         - predniSONE (DELTASONE) 20 MG tablet; Take 2 tablets (40 mg) by mouth daily for 5 days, THEN 1 tablet (20 mg) daily for 5 days.  Dispense: 15 tablet; Refill: 0  - azithromycin (ZITHROMAX) 250 MG tablet; Take 2 tablets (500 mg) by mouth daily for 1 day, THEN 1 tablet (250 mg) daily for 4 days.  Dispense: 6 tablet; Refill: 0  - benzonatate (TESSALON) 200 MG capsule; Take 1 capsule (200 mg) by mouth 3 times daily as needed for cough  Dispense: 30 capsule; Refill: 0      Rosa Isela Cabrera is a 82 year old accompanied by her spouse, presenting for the following health issues:  Headache (Ongoing headaches since April) and Cough      Seen for cough  Had it since early December -     Coughing constantly -   Took Benzonatate and Doxycycline  Bringing up phlegm   It's green   No sinus pain/drainage  No face pain  All in chest / throat     Sleeps pretty good -   Has \"spells\" at night -   Doesn't cough as much at night       Headache   Pertinent negatives include no fever and no shortness of breath.   Cough  Associated symptoms include headaches. Pertinent negatives include no chest pain, no chills, no sore throat and no shortness of breath.   History of Present Illness       Reason for visit:  Cough    She eats 0-1 servings of fruits and vegetables daily.She consumes 1 sweetened beverage(s) daily.She exercises with enough effort to increase her heart rate 9 or less minutes per day.  She exercises with enough effort to increase her heart rate 3 or less days per week.   She is taking medications regularly.             Review of Systems   Constitutional: Negative for activity change, chills and fever.   HENT: Negative.  Negative for congestion " and sore throat.    Eyes: Negative for visual disturbance.   Respiratory: Positive for cough. Negative for shortness of breath.    Cardiovascular: Negative for chest pain and peripheral edema.   Gastrointestinal: Negative for heartburn.   Endocrine: Negative for polydipsia and polyuria.   Genitourinary: Negative.    Musculoskeletal: Negative.    Allergic/Immunologic: Negative.    Neurological: Positive for headaches.   Psychiatric/Behavioral: Negative.    All other systems reviewed and are negative.           Objective    BP (!) 149/83 (BP Location: Right arm, Patient Position: Sitting, Cuff Size: Adult Regular)   Pulse 95   Temp 97.5  F (36.4  C) (Temporal)   Resp 18   Wt 67.6 kg (149 lb)   SpO2 98%   BMI 26.07 kg/m    Body mass index is 26.07 kg/m .  Physical Exam  Vitals reviewed.   Constitutional:       General: She is not in acute distress.     Appearance: Normal appearance.   HENT:      Head: Normocephalic.      Right Ear: Tympanic membrane, ear canal and external ear normal.      Left Ear: Tympanic membrane, ear canal and external ear normal.      Nose: Nose normal.      Mouth/Throat:      Mouth: Mucous membranes are moist.      Pharynx: No posterior oropharyngeal erythema.   Eyes:      Extraocular Movements: Extraocular movements intact.      Conjunctiva/sclera: Conjunctivae normal.      Pupils: Pupils are equal, round, and reactive to light.   Cardiovascular:      Rate and Rhythm: Normal rate and regular rhythm.      Pulses: Normal pulses.      Heart sounds: Normal heart sounds. No murmur heard.  Pulmonary:      Effort: Pulmonary effort is normal.      Breath sounds: Rhonchi (harsh cough) present.   Abdominal:      Palpations: Abdomen is soft. There is no mass.      Tenderness: There is no abdominal tenderness. There is no guarding or rebound.   Musculoskeletal:         General: No deformity. Normal range of motion.      Cervical back: Normal range of motion and neck supple.   Lymphadenopathy:       Cervical: No cervical adenopathy.   Skin:     General: Skin is warm and dry.   Neurological:      General: No focal deficit present.      Mental Status: She is alert.   Psychiatric:         Mood and Affect: Mood normal.         Behavior: Behavior normal.            No results found for any visits on 01/13/23.

## 2023-01-23 ASSESSMENT — ENCOUNTER SYMPTOMS
SORE THROAT: 0
ALLERGIC/IMMUNOLOGIC NEGATIVE: 1
FEVER: 0
HEARTBURN: 0
MUSCULOSKELETAL NEGATIVE: 1
PSYCHIATRIC NEGATIVE: 1
ACTIVITY CHANGE: 0
POLYDIPSIA: 0
CHILLS: 0
SHORTNESS OF BREATH: 0

## 2023-02-15 ENCOUNTER — TRANSFERRED RECORDS (OUTPATIENT)
Dept: HEALTH INFORMATION MANAGEMENT | Facility: CLINIC | Age: 83
End: 2023-02-15

## 2023-02-27 DIAGNOSIS — G25.81 RESTLESS LEG SYNDROME: ICD-10-CM

## 2023-02-28 RX ORDER — GABAPENTIN 300 MG/1
CAPSULE ORAL
Qty: 540 CAPSULE | Refills: 3 | Status: SHIPPED | OUTPATIENT
Start: 2023-02-28 | End: 2024-01-19

## 2023-04-03 ENCOUNTER — OFFICE VISIT (OUTPATIENT)
Dept: FAMILY MEDICINE | Facility: CLINIC | Age: 83
End: 2023-04-03
Payer: COMMERCIAL

## 2023-04-03 VITALS
HEART RATE: 95 BPM | WEIGHT: 149 LBS | DIASTOLIC BLOOD PRESSURE: 78 MMHG | TEMPERATURE: 98.3 F | RESPIRATION RATE: 18 BRPM | OXYGEN SATURATION: 97 % | BODY MASS INDEX: 26.4 KG/M2 | HEIGHT: 63 IN | SYSTOLIC BLOOD PRESSURE: 118 MMHG

## 2023-04-03 DIAGNOSIS — R05.3 CHRONIC COUGH: ICD-10-CM

## 2023-04-03 DIAGNOSIS — I10 ESSENTIAL HYPERTENSION: Primary | ICD-10-CM

## 2023-04-03 DIAGNOSIS — R07.89 OTHER CHEST PAIN: ICD-10-CM

## 2023-04-03 PROCEDURE — 99214 OFFICE O/P EST MOD 30 MIN: CPT | Performed by: FAMILY MEDICINE

## 2023-04-03 RX ORDER — FLUTICASONE PROPIONATE AND SALMETEROL 250; 50 UG/1; UG/1
1 POWDER RESPIRATORY (INHALATION) EVERY 12 HOURS
Qty: 60 EACH | Refills: 1 | Status: SHIPPED | OUTPATIENT
Start: 2023-04-03 | End: 2023-06-19

## 2023-04-03 ASSESSMENT — ENCOUNTER SYMPTOMS: COUGH: 1

## 2023-04-03 NOTE — PROGRESS NOTES
"1. Essential hypertension  H/o hypertension - patient is very concerned about this.  Wants to do coronary artery testing.    - CT Coronary Artery Angio w Calcium Score; Future    2. Chronic cough  H/o chronic cough - has not responded to PPI, antibiotics.  Will treat with inhaled steroid.  Discussed reasoning - if no improvement in 2-4 weeks, will need further evaluation.   - fluticasone-salmeterol (ADVAIR) 250-50 MCG/ACT inhaler; Inhale 1 puff into the lungs every 12 hours  Dispense: 60 each; Refill: 1    3. Other chest pain  As above.    - CT Coronary Artery Angio w Calcium Score; Future      Subjective   Deborah is a 83 year old, presenting for the following health issues:  Cough and Nasal Congestion        4/3/2023     2:07 PM   Additional Questions   Roomed by Margarita   Accompanied by Spouse     1.  Coughing x 2 weeks solid   Comes and goes -   Coughs up phlegm every time - not a little bit, but a lot of it   Yellowish -   No fever, no chills -   Doesn't feel like it's coming from her stomach; feels like it's \"bronchial\"    2.  [facility] Screening -   Carotid:  Left mild, right normal   Atrial fib - normal - 97  PAD - normal bilateral  Lipids:  , HDL 52, ,   Glucose 94  CRP 1.40   Blood pressure 153/102   BMI 25  Heart risk assessment:  10 yr heart risk - aged out of range -     3.  Had high blood pressure at screening -   hydrochlorothiazide 25 mg daily     4.  Doesn't sleep at night because of her cough   Comes and goes - no set time of day or night -     5. Worries about her family history of heart disease   Wants CT coronary calcium     6.          Cough  Pertinent negatives include no chest pain and no chills.   History of Present Illness       Reason for visit:  Cold Sx's  Symptom onset:  1-2 weeks ago  Symptoms include:  Cough and phlegm  Symptom intensity:  Mild  Symptom progression:  Improving  Had these symptoms before:  No  What makes it worse:  N/A  What makes it better:  Taking " "throat lozenges                Review of Systems   Constitutional: Negative for activity change, chills, fever and unexpected weight change.   HENT: Positive for congestion.         Dry mouth     Eyes: Negative for visual disturbance.   Respiratory: Positive for cough.    Cardiovascular: Negative for chest pain and peripheral edema.   Gastrointestinal: Negative for abdominal pain.   Endocrine: Negative for polydipsia and polyuria.   Genitourinary: Negative.    Musculoskeletal: Positive for arthralgias.   Skin: Negative.    Allergic/Immunologic: Negative.    Neurological: Negative.    Hematological: Does not bruise/bleed easily.   Psychiatric/Behavioral: Negative.             Objective    /78 (BP Location: Left arm, Patient Position: Sitting, Cuff Size: Adult Regular)   Pulse 95   Temp 98.3  F (36.8  C) (Temporal)   Resp 18   Ht 1.607 m (5' 3.25\")   Wt 67.6 kg (149 lb)   SpO2 97%   BMI 26.19 kg/m    Body mass index is 26.19 kg/m .  Physical Exam  Vitals reviewed.   Constitutional:       General: She is not in acute distress.     Appearance: Normal appearance.   HENT:      Head: Normocephalic.      Right Ear: Tympanic membrane, ear canal and external ear normal.      Left Ear: Tympanic membrane, ear canal and external ear normal.      Nose: Nose normal.      Mouth/Throat:      Mouth: Mucous membranes are moist.      Pharynx: No posterior oropharyngeal erythema.   Eyes:      Extraocular Movements: Extraocular movements intact.      Conjunctiva/sclera: Conjunctivae normal.      Pupils: Pupils are equal, round, and reactive to light.   Cardiovascular:      Rate and Rhythm: Normal rate and regular rhythm.      Pulses: Normal pulses.      Heart sounds: Normal heart sounds. No murmur heard.  Pulmonary:      Effort: Pulmonary effort is normal. No respiratory distress.      Breath sounds: Normal breath sounds. No wheezing, rhonchi or rales.   Chest:      Chest wall: No tenderness.   Abdominal:      Palpations: " Abdomen is soft. There is no mass.      Tenderness: There is no abdominal tenderness. There is no guarding or rebound.   Musculoskeletal:         General: No deformity. Normal range of motion.      Cervical back: Normal range of motion and neck supple.   Lymphadenopathy:      Cervical: No cervical adenopathy.   Skin:     General: Skin is warm and dry.   Neurological:      General: No focal deficit present.      Mental Status: She is alert.   Psychiatric:         Mood and Affect: Mood normal.         Behavior: Behavior normal.            No results found for any visits on 04/03/23.

## 2023-04-04 DIAGNOSIS — R05.3 CHRONIC COUGH: Primary | ICD-10-CM

## 2023-04-09 ASSESSMENT — ENCOUNTER SYMPTOMS
CHILLS: 0
PSYCHIATRIC NEGATIVE: 1
UNEXPECTED WEIGHT CHANGE: 0
ALLERGIC/IMMUNOLOGIC NEGATIVE: 1
FEVER: 0
BRUISES/BLEEDS EASILY: 0
NEUROLOGICAL NEGATIVE: 1
ACTIVITY CHANGE: 0
POLYDIPSIA: 0
ABDOMINAL PAIN: 0
ARTHRALGIAS: 1

## 2023-04-20 ENCOUNTER — PATIENT OUTREACH (OUTPATIENT)
Dept: CARE COORDINATION | Facility: CLINIC | Age: 83
End: 2023-04-20
Payer: COMMERCIAL

## 2023-04-29 NOTE — PROGRESS NOTES
"CHIEF COMPLAINT: Patient presents with:  Cough: Over several years, worsened in Dec. 2022 pt is coughing up mucus          HISTORY OF PRESENT ILLNESS    Deborah was seen at the behest of Emmanuelle Woodall MD for chronic cough.  Symptoms have been present for years.  Soda is regular.  Symptoms have been worse since the beginning of the year.  Cough is occasionally productive.  No swallowing concerns.    Has tried antibiotics in the past.   Was placed on an inhaler but patient was concerned about side effect and did not take.  Drinks jamee mist regularly.  She has a long history of hearing loss.  Reports a \"sizzling\" sound in both ears         RSI = 25         REVIEW OF SYSTEMS    Review of Systems as per HPI and PMHx, otherwise 10 system review system are negative.       ALLERGIES    Patient has no known allergies.    CURRENT MEDICATIONS      Current Outpatient Medications:      pantoprazole (PROTONIX) 40 MG EC tablet, Take 1 tablet (40 mg) by mouth daily for 90 days, Disp: 90 tablet, Rfl: 0     calcium carbonate-vitamin D3 (CALCIUM 600 + D,3,) 600 mg(1,500mg) -200 unit per tablet, [CALCIUM CARBONATE-VITAMIN D3 (CALCIUM 600 + D,3,) 600 MG(1,500MG) -200 UNIT PER TABLET] Take 1 tablet by mouth 2 (two) times a day., Disp: , Rfl: 0     fluticasone-salmeterol (ADVAIR) 250-50 MCG/ACT inhaler, Inhale 1 puff into the lungs every 12 hours, Disp: 60 each, Rfl: 1     gabapentin (NEURONTIN) 300 MG capsule, TAKE 2 CAPSULES BY MOUTH THREE TIMES DAILY. GENERIC EQUIVALENT FOR NEURONTIN, Disp: 540 capsule, Rfl: 3     hydrochlorothiazide (HYDRODIURIL) 25 MG tablet, Take 1 tablet (25 mg) by mouth daily, Disp: 90 tablet, Rfl: 2     MULTIVITAMIN (MULTIPLE VITAMIN ORAL), [MULTIVITAMIN (MULTIPLE VITAMIN ORAL)] Take 1 tablet by mouth daily., Disp: , Rfl:      nortriptyline (PAMELOR) 75 MG capsule, Take 1 capsule (75 mg) by mouth At Bedtime, Disp: 90 capsule, Rfl: 3     PAST MEDICAL HISTORY    PAST MEDICAL HISTORY:   Past Medical " History:   Diagnosis Date     Atypical Chest Pain     Created by Conversion      Chronic cough 1/5/2015     Constipation      Coronary atherosclerosis      Dizziness and giddiness      Epistaxis      Essential hypertension      Gastroesophageal reflux disease without esophagitis 2/18/2016     Hereditary and idiopathic peripheral neuropathy      Hypermagnesemia     Created by Conversion Replacement Utility updated for latest IMO load      Impacted cerumen      Osteoarthrosis involving lower leg      Osteoporosis      Pain in joint, lower leg      Pseudotumor (inflammatory) of orbit, 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     Restless legs syndrome (RLS)      Seborrheic dermatitis, unspecified      Spinal stenosis, unspecified region other than cervical        PAST SURGICAL HISTORY    PAST SURGICAL HISTORY:   Past Surgical History:   Procedure Laterality Date     ABDOMEN SURGERY       APPENDECTOMY       BACK SURGERY       CHOLECYSTECTOMY       COSMETIC SURGERY      blepharoplasty     EYE SURGERY       FRACTURE SURGERY       HC DILATION/CURETTAGE DIAG/THER NON OB      Description: Dilation And Curettage;  Recorded: 02/02/2010;  Comments: due to miscarriage     HC KNEE SCOPE, DIAGNOSTIC      Description: Arthroscopy Knee Right;  Recorded: 02/02/2010;  Comments: right knee - arthroscopy for debridement; then Synvisc     LUMBAR FUSION  08/01/2013       FAMILY  HISTORY    FAMILY HISTORY: No family history on file.    SOCIAL HISTORY    SOCIAL HISTORY:   Social History     Tobacco Use     Smoking status: Never     Smokeless tobacco: Never   Vaping Use     Vaping status: Never Used   Substance Use Topics     Alcohol use: No        PHYSICAL EXAM    HEAD: Normal appearance and symmetry:  No cutaneous lesions.      NECK:  supple     EARS:    Right:   Cerumen obscuring TM   LEFT:  Cerumen obscuring TM    EYES:  EOMI    CN VII/XII:  intact     NOSE:     Dorsum:   straight  Septum:  midline  Mucosa:  moist        ORAL CAVITY/OROPHARYNX:     Lips:   Normal.  Tongue: normal, midline  Mucosa:   no lesions     NECK:  Trachea:  midline.              Thyroid:  normal              Adenopathy:  none        NEURO:   Alert and Oriented     GAIT AND STATION:  normal     RESPIRATORY:   Symmetry and Respiratory effort     PSYCH:  Normal mood and affect     SKIN:   warm and dry         FLEX LARYNGOSCOPY:    After obtaining consent, a flexible laryngoscope is used to examine both nasal cavities, the nasopharynx, pharnx, and larynx.      Nasal cavity: wnl  NP: wnl  Pharnx: inflamed  Larynx: thckending of PC with scan secretions      IMPRESSION:    Encounter Diagnoses   Name Primary?     Chronic cough      Hearing difficulty, bilateral Yes     Tinnitus, bilateral      Bilateral impacted cerumen           RECOMMENDATIONS:      Orders Placed This Encounter   Procedures     LARYNGOSCOPY FLEX FIBEROPTIC, DIAGNOSTIC     XR Esophagram     XR Chest 2 Views     Adult Pulmonary Medicine Referral      Return visit 4 months for ear cleaning and audiogram afterwards.

## 2023-05-01 ENCOUNTER — HOSPITAL ENCOUNTER (OUTPATIENT)
Dept: GENERAL RADIOLOGY | Facility: HOSPITAL | Age: 83
Discharge: HOME | End: 2023-05-01
Attending: OTOLARYNGOLOGY | Admitting: OTOLARYNGOLOGY
Payer: COMMERCIAL

## 2023-05-01 ENCOUNTER — OFFICE VISIT (OUTPATIENT)
Dept: OTOLARYNGOLOGY | Facility: CLINIC | Age: 83
End: 2023-05-01
Attending: FAMILY MEDICINE
Payer: COMMERCIAL

## 2023-05-01 DIAGNOSIS — H91.93 HEARING DIFFICULTY, BILATERAL: Primary | ICD-10-CM

## 2023-05-01 DIAGNOSIS — H61.23 BILATERAL IMPACTED CERUMEN: ICD-10-CM

## 2023-05-01 DIAGNOSIS — H93.13 TINNITUS, BILATERAL: ICD-10-CM

## 2023-05-01 DIAGNOSIS — R05.3 CHRONIC COUGH: ICD-10-CM

## 2023-05-01 DIAGNOSIS — R05.3 CHRONIC COUGH: Primary | ICD-10-CM

## 2023-05-01 PROCEDURE — 71046 X-RAY EXAM CHEST 2 VIEWS: CPT

## 2023-05-01 PROCEDURE — 99214 OFFICE O/P EST MOD 30 MIN: CPT | Mod: 25 | Performed by: OTOLARYNGOLOGY

## 2023-05-01 PROCEDURE — 31575 DIAGNOSTIC LARYNGOSCOPY: CPT | Performed by: OTOLARYNGOLOGY

## 2023-05-01 RX ORDER — PANTOPRAZOLE SODIUM 40 MG/1
40 TABLET, DELAYED RELEASE ORAL DAILY
Qty: 90 TABLET | Refills: 0 | Status: SHIPPED | OUTPATIENT
Start: 2023-05-01 | End: 2023-06-19

## 2023-05-01 NOTE — LETTER
"    5/1/2023         RE: Deborah Mireles  [address]  [place] [id number]        Dear Colleague,    Thank you for referring your patient, Deborah Mireles, to the [facility]. Please see a copy of my visit note below.    CHIEF COMPLAINT: Patient presents with:  Cough: Over several years, worsened in Dec. 2022 pt is coughing up mucus          HISTORY OF PRESENT ILLNESS    Deborah was seen at the behest of Emmanuelle Woodall MD for chronic cough.  Symptoms have been present for years.  Soda is regular.  Symptoms have been worse since the beginning of the year.  Cough is occasionally productive.  No swallowing concerns.    Has tried antibiotics in the past.   Was placed on an inhaler but patient was concerned about side effect and did not take.  Drinks jamee mist regularly.  She has a long history of hearing loss.  Reports a \"sizzling\" sound in both ears         RSI = 25         REVIEW OF SYSTEMS    Review of Systems as per HPI and PMHx, otherwise 10 system review system are negative.       ALLERGIES    Patient has no known allergies.    CURRENT MEDICATIONS      Current Outpatient Medications:      pantoprazole (PROTONIX) 40 MG EC tablet, Take 1 tablet (40 mg) by mouth daily for 90 days, Disp: 90 tablet, Rfl: 0     calcium carbonate-vitamin D3 (CALCIUM 600 + D,3,) 600 mg(1,500mg) -200 unit per tablet, [CALCIUM CARBONATE-VITAMIN D3 (CALCIUM 600 + D,3,) 600 MG(1,500MG) -200 UNIT PER TABLET] Take 1 tablet by mouth 2 (two) times a day., Disp: , Rfl: 0     fluticasone-salmeterol (ADVAIR) 250-50 MCG/ACT inhaler, Inhale 1 puff into the lungs every 12 hours, Disp: 60 each, Rfl: 1     gabapentin (NEURONTIN) 300 MG capsule, TAKE 2 CAPSULES BY MOUTH THREE TIMES DAILY. GENERIC EQUIVALENT FOR NEURONTIN, Disp: 540 capsule, Rfl: 3     hydrochlorothiazide (HYDRODIURIL) 25 MG tablet, Take 1 tablet (25 mg) by mouth daily, Disp: 90 tablet, Rfl: 2     MULTIVITAMIN (MULTIPLE VITAMIN ORAL), " [MULTIVITAMIN (MULTIPLE VITAMIN ORAL)] Take 1 tablet by mouth daily., Disp: , Rfl:      nortriptyline (PAMELOR) 75 MG capsule, Take 1 capsule (75 mg) by mouth At Bedtime, Disp: 90 capsule, Rfl: 3     PAST MEDICAL HISTORY    PAST MEDICAL HISTORY:   Past Medical History:   Diagnosis Date     Atypical Chest Pain     Created by Conversion      Chronic cough 1/5/2015     Constipation      Coronary atherosclerosis      Dizziness and giddiness      Epistaxis      Essential hypertension      Gastroesophageal reflux disease without esophagitis 2/18/2016     Hereditary and idiopathic peripheral neuropathy      Hypermagnesemia     Created by Conversion Replacement Utility updated for latest IMO load      Impacted cerumen      Osteoarthrosis involving lower leg      Osteoporosis      Pain in joint, lower leg      Pseudotumor (inflammatory) of orbit, 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     Restless legs syndrome (RLS)      Seborrheic dermatitis, unspecified      Spinal stenosis, unspecified region other than cervical        PAST SURGICAL HISTORY    PAST SURGICAL HISTORY:   Past Surgical History:   Procedure Laterality Date     ABDOMEN SURGERY       APPENDECTOMY       BACK SURGERY       CHOLECYSTECTOMY       COSMETIC SURGERY      blepharoplasty     EYE SURGERY       FRACTURE SURGERY       HC DILATION/CURETTAGE DIAG/THER NON OB      Description: Dilation And Curettage;  Recorded: 02/02/2010;  Comments: due to miscarriage     HC KNEE SCOPE, DIAGNOSTIC      Description: Arthroscopy Knee Right;  Recorded: 02/02/2010;  Comments: right knee - arthroscopy for debridement; then Synvisc     LUMBAR FUSION  08/01/2013       FAMILY  HISTORY    FAMILY HISTORY: No family history on file.    SOCIAL HISTORY    SOCIAL HISTORY:   Social History     Tobacco Use     Smoking status: Never     Smokeless tobacco: Never   Vaping Use     Vaping status: Never Used   Substance Use Topics     Alcohol use: No        PHYSICAL EXAM    HEAD: Normal appearance and symmetry:  No  cutaneous lesions.      NECK:  supple     EARS:    Right:   Cerumen obscuring TM   LEFT:  Cerumen obscuring TM    EYES:  EOMI    CN VII/XII:  intact     NOSE:     Dorsum:   straight  Septum:  midline  Mucosa:  moist        ORAL CAVITY/OROPHARYNX:     Lips:  Normal.  Tongue: normal, midline  Mucosa:   no lesions     NECK:  Trachea:  midline.              Thyroid:  normal              Adenopathy:  none        NEURO:   Alert and Oriented     GAIT AND STATION:  normal     RESPIRATORY:   Symmetry and Respiratory effort     PSYCH:  Normal mood and affect     SKIN:   warm and dry         FLEX LARYNGOSCOPY:    After obtaining consent, a flexible laryngoscope is used to examine both nasal cavities, the nasopharynx, pharnx, and larynx.      Nasal cavity: wnl  NP: wnl  Pharnx: inflamed  Larynx: thckending of PC with scan secretions      IMPRESSION:    Encounter Diagnoses   Name Primary?     Chronic cough      Hearing difficulty, bilateral Yes     Tinnitus, bilateral      Bilateral impacted cerumen           RECOMMENDATIONS:      Orders Placed This Encounter   Procedures     LARYNGOSCOPY FLEX FIBEROPTIC, DIAGNOSTIC     XR Esophagram     XR Chest 2 Views     Adult Pulmonary Medicine Referral      Return visit 4 months for ear cleaning and audiogram afterwards.       Again, thank you for allowing me to participate in the care of your patient.        Sincerely,        Jose Miguel Rodriguez MD

## 2023-05-01 NOTE — PATIENT INSTRUCTIONS
Acid Suppression Treatment    Start protonix as directed, 30 minutes before breakfast  You will follow up with Pulmonary medicine  Chest X ray today  Swallow study ordered (to be done at hospital)       Lifestyle changes:    Avoid eating 2-3 hours before bedtime.   You may find it helpful to elevate the head of your bed.     Avoid following foods that are likely to trigger acid reflux:    Coffee or tea (try LOW ACID coffee or herbal tea)  Anything that s fizzy or has caffeine in it  Alcohol   Citrus fruits, such as oranges and chad  Tomato based foods (salsa, pizza, lasanga)  Chocolate   Mint or peppermint  Fatty foods (ice cream)  Spicy foods  Onions and garlic      Try alkaline water (ph 9.5) instead of regular water

## 2023-05-04 ENCOUNTER — HOSPITAL ENCOUNTER (OUTPATIENT)
Dept: CT IMAGING | Facility: CLINIC | Age: 83
Discharge: HOME | End: 2023-05-04
Attending: FAMILY MEDICINE | Admitting: FAMILY MEDICINE
Payer: COMMERCIAL

## 2023-05-04 VITALS — DIASTOLIC BLOOD PRESSURE: 65 MMHG | SYSTOLIC BLOOD PRESSURE: 138 MMHG

## 2023-05-04 DIAGNOSIS — I10 ESSENTIAL HYPERTENSION: ICD-10-CM

## 2023-05-04 DIAGNOSIS — R07.89 OTHER CHEST PAIN: ICD-10-CM

## 2023-05-04 LAB
BSA FOR ECHO PROCEDURE: 1.71 M2
CCTA ASCENDING AORTA: 3.3
CCTA SINUS: 4.1
CREAT BLD-MCNC: 0.8 MG/DL (ref 0.6–1.1)
CV CALCIUM SCORE AGATSTON LM: 0
CV CALCIUM SCORING AGATSON LAD: 88
CV CALCIUM SCORING AGATSTON CX: 66
CV CALCIUM SCORING AGATSTON RCA: 82
CV CALCIUM SCORING AGATSTON TOTAL: 236
GFR SERPL CREATININE-BSD FRML MDRD: >60 ML/MIN/1.73M2

## 2023-05-04 PROCEDURE — 75574 CT ANGIO HRT W/3D IMAGE: CPT | Mod: 26 | Performed by: GENERAL ACUTE CARE HOSPITAL

## 2023-05-04 PROCEDURE — 250N000011 HC RX IP 250 OP 636: Performed by: FAMILY MEDICINE

## 2023-05-04 PROCEDURE — 75574 CT ANGIO HRT W/3D IMAGE: CPT

## 2023-05-04 PROCEDURE — 250N000013 HC RX MED GY IP 250 OP 250 PS 637: Performed by: FAMILY MEDICINE

## 2023-05-04 PROCEDURE — 250N000009 HC RX 250: Performed by: FAMILY MEDICINE

## 2023-05-04 PROCEDURE — 82565 ASSAY OF CREATININE: CPT

## 2023-05-04 RX ORDER — METOPROLOL TARTRATE 1 MG/ML
5 INJECTION, SOLUTION INTRAVENOUS
Status: DISCONTINUED | OUTPATIENT
Start: 2023-05-04 | End: 2023-05-05 | Stop reason: HOSPADM

## 2023-05-04 RX ORDER — IOPAMIDOL 755 MG/ML
100 INJECTION, SOLUTION INTRAVASCULAR ONCE
Status: COMPLETED | OUTPATIENT
Start: 2023-05-04 | End: 2023-05-04

## 2023-05-04 RX ORDER — DILTIAZEM HYDROCHLORIDE 5 MG/ML
5 INJECTION INTRAVENOUS
Status: DISCONTINUED | OUTPATIENT
Start: 2023-05-04 | End: 2023-05-05 | Stop reason: HOSPADM

## 2023-05-04 RX ORDER — NITROGLYCERIN 0.4 MG/1
0.4 TABLET SUBLINGUAL ONCE
Status: COMPLETED | OUTPATIENT
Start: 2023-05-04 | End: 2023-05-04

## 2023-05-04 RX ORDER — DILTIAZEM HYDROCHLORIDE 5 MG/ML
10 INJECTION INTRAVENOUS
Status: DISCONTINUED | OUTPATIENT
Start: 2023-05-04 | End: 2023-05-05 | Stop reason: HOSPADM

## 2023-05-04 RX ORDER — LIDOCAINE 40 MG/G
CREAM TOPICAL
Status: DISCONTINUED | OUTPATIENT
Start: 2023-05-04 | End: 2023-05-05 | Stop reason: HOSPADM

## 2023-05-04 RX ADMIN — METOPROLOL TARTRATE 5 MG: 5 INJECTION INTRAVENOUS at 10:21

## 2023-05-04 RX ADMIN — METOPROLOL TARTRATE 5 MG: 5 INJECTION INTRAVENOUS at 10:14

## 2023-05-04 RX ADMIN — NITROGLYCERIN 0.4 MG: 0.4 TABLET SUBLINGUAL at 10:22

## 2023-05-04 RX ADMIN — METOPROLOL TARTRATE 5 MG: 5 INJECTION INTRAVENOUS at 10:18

## 2023-05-04 RX ADMIN — IOPAMIDOL 100 ML: 755 INJECTION, SOLUTION INTRAVENOUS at 10:28

## 2023-05-16 ENCOUNTER — HOSPITAL ENCOUNTER (OUTPATIENT)
Dept: RADIOLOGY | Facility: HOSPITAL | Age: 83
Discharge: HOME | End: 2023-05-16
Attending: OTOLARYNGOLOGY | Admitting: OTOLARYNGOLOGY
Payer: COMMERCIAL

## 2023-05-16 DIAGNOSIS — R05.3 CHRONIC COUGH: ICD-10-CM

## 2023-05-16 PROCEDURE — 74221 X-RAY XM ESOPHAGUS 2CNTRST: CPT

## 2023-05-16 PROCEDURE — 250N000013 HC RX MED GY IP 250 OP 250 PS 637: Performed by: OTOLARYNGOLOGY

## 2023-05-16 RX ADMIN — ANTACID/ANTIFLATULENT 4 G: 380; 550; 10; 10 GRANULE, EFFERVESCENT ORAL at 14:00

## 2023-05-17 DIAGNOSIS — R05.3 CHRONIC COUGH: Primary | ICD-10-CM

## 2023-06-12 ENCOUNTER — OFFICE VISIT (OUTPATIENT)
Dept: SURGERY | Facility: CLINIC | Age: 83
End: 2023-06-12
Attending: OTOLARYNGOLOGY
Payer: COMMERCIAL

## 2023-06-12 VITALS
BODY MASS INDEX: 26.38 KG/M2 | SYSTOLIC BLOOD PRESSURE: 128 MMHG | WEIGHT: 148.9 LBS | HEIGHT: 63 IN | DIASTOLIC BLOOD PRESSURE: 82 MMHG

## 2023-06-12 DIAGNOSIS — R05.3 CHRONIC COUGH: ICD-10-CM

## 2023-06-12 PROCEDURE — 99204 OFFICE O/P NEW MOD 45 MIN: CPT | Performed by: SURGERY

## 2023-06-12 RX ORDER — BENZONATATE 100 MG/1
CAPSULE ORAL
COMMUNITY
Start: 2023-05-27 | End: 2023-06-19

## 2023-06-12 NOTE — LETTER
6/12/2023         RE: Deborah Mireles  [address]  [place] [id number]        Dear Colleague,    Thank you for referring your patient, Deborah Mireles, to the [facility] [place]. Please see a copy of my visit note below.    HPI: Deborah Mireles is a 83 year old female referred to see me by Emmanuelle Woodall for evaluation of gastroesophageal reflux disease.  She notes  a several months history of a chronic cough which is limiting her lifestyle.  She notes excessive phlegm which she attributes to stimulating the cough.  She denies any sinus infections or any significant reflux-like symptoms.  She is able to lay flat at night no cough.  She states the cough varies throughout the day and is not exacerbated by any particular foods or activities.  She does feel as though she is constantly clearing her throat.  Denies any heartburn, waterbrash or any GERD related symptoms.      Allergies:Patient has no known allergies.    Past Medical History:   Diagnosis Date     Atypical Chest Pain     Created by Conversion      Chronic cough 1/5/2015     Constipation      Coronary atherosclerosis      Dizziness and giddiness      Epistaxis      Essential hypertension      Gastroesophageal reflux disease without esophagitis 2/18/2016     Hereditary and idiopathic peripheral neuropathy      Hypermagnesemia     Created by Conversion Replacement Utility updated for latest IMO load      Impacted cerumen      Osteoarthrosis involving lower leg      Osteoporosis      Pain in joint, lower leg      Pseudotumor (inflammatory) of orbit, 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     Restless legs syndrome (RLS)      Seborrheic dermatitis, unspecified      Spinal stenosis, unspecified region other than cervical        Past Surgical History:   Procedure Laterality Date     ABDOMEN SURGERY       APPENDECTOMY       BACK SURGERY       CHOLECYSTECTOMY       COSMETIC SURGERY      blepharoplasty      EYE SURGERY       FRACTURE SURGERY       HC DILATION/CURETTAGE DIAG/THER NON OB      Description: Dilation And Curettage;  Recorded: 02/02/2010;  Comments: due to miscarriage     HC KNEE SCOPE, DIAGNOSTIC      Description: Arthroscopy Knee Right;  Recorded: 02/02/2010;  Comments: right knee - arthroscopy for debridement; then Synvisc     LUMBAR FUSION  08/01/2013       CURRENT MEDS:    Current Outpatient Medications:      benzonatate (TESSALON) 100 MG capsule, , Disp: , Rfl:      calcium carbonate-vitamin D3 (CALCIUM 600 + D,3,) 600 mg(1,500mg) -200 unit per tablet, [CALCIUM CARBONATE-VITAMIN D3 (CALCIUM 600 + D,3,) 600 MG(1,500MG) -200 UNIT PER TABLET] Take 1 tablet by mouth 2 (two) times a day., Disp: , Rfl: 0     gabapentin (NEURONTIN) 300 MG capsule, TAKE 2 CAPSULES BY MOUTH THREE TIMES DAILY. GENERIC EQUIVALENT FOR NEURONTIN, Disp: 540 capsule, Rfl: 3     hydrochlorothiazide (HYDRODIURIL) 25 MG tablet, Take 1 tablet (25 mg) by mouth daily, Disp: 90 tablet, Rfl: 2     MULTIVITAMIN (MULTIPLE VITAMIN ORAL), [MULTIVITAMIN (MULTIPLE VITAMIN ORAL)] Take 1 tablet by mouth daily., Disp: , Rfl:      nortriptyline (PAMELOR) 75 MG capsule, Take 1 capsule (75 mg) by mouth At Bedtime, Disp: 90 capsule, Rfl: 3     fluticasone-salmeterol (ADVAIR) 250-50 MCG/ACT inhaler, Inhale 1 puff into the lungs every 12 hours (Patient not taking: Reported on 6/12/2023), Disp: 60 each, Rfl: 1     pantoprazole (PROTONIX) 40 MG EC tablet, Take 1 tablet (40 mg) by mouth daily for 90 days (Patient not taking: Reported on 6/12/2023), Disp: 90 tablet, Rfl: 0    No family history on file.     reports that she has never smoked. She has never used smokeless tobacco. She reports that she does not drink alcohol and does not use drugs.    Review of Systems:  The 12 system review is within normal limits except for as mentioned above.  General ROS: No complaints or constitutional symptoms  Ophthalmic ROS: No complaints of visual changes  Skin: No  "complaints or symptoms   Endocrine: No complaints or symptoms  Hematologic/Lymphatic: No symptoms or complaints  Psychiatric: No symptoms or complaints  Respiratory ROS: no cough, shortness of breath, or wheezing  Cardiovascular ROS: no chest pain or dyspnea on exertion  Gastrointestinal ROS: As per HPI  Genito-Urinary ROS: no dysuria, trouble voiding, or hematuria  Musculoskeletal ROS: no joint or muscle pain  Neurological ROS: no TIA or stroke symptoms      EXAM:  /82 (BP Location: Right arm, Patient Position: Sitting)   Ht 1.607 m (5' 3.25\")   Wt 67.5 kg (148 lb 14.4 oz)   BMI 26.17 kg/m    GENERAL: Well developed female, No acute distress, pleasant and conversant   EYES: Pupils equal, round and reactive, no scleral icterus  ABDOMEN: Soft, non-tender, no masses, non-distended  SKIN: Pink, warm and dry, no obvious rashes or lesions   NEURO:No focal deficits, ambulatory  MUSCULOSKELETAL:No obvious deformities, no swelling, normal appearing      LABS:  Lab Results   Component Value Date    WBC 6.7 09/08/2020    HGB 14.5 09/24/2021    HCT 42.4 09/08/2020    MCV 95 09/08/2020     09/08/2020     INR/Prothrombin Time  @LABRCNTIP(NA,K,CL,co2,bun,creatinine,labglom,glucose,calcium)@  Lab Results   Component Value Date    ALT 21 09/08/2020    AST 24 09/08/2020    ALKPHOS 68 09/08/2020       IMAGES:   Relevant images were reviewed and discussed with the patient.  Notable findings were: Esophagram images demonstrated moderate reflux    Assessment/Plan:   Deborah Mireles is a 83 year old female with signs and symptoms consistent with a chronic cough which may be attributed to reflux.  I have explained the pathophysiology of GERD in detail as well as the surgical versus non-operative management strategies.      At this point we will proceed with continued initial work-up.  This will include endoscopy to evaluate the distal esophagus as well as proximal esophagus.  There may be a component of allergies versus " other upper cricopharyngeal muscular dysfunction which will require investigation in the future.    Sabas Georges DO [facility]  [phone]  [facility] Department of Surgery      Again, thank you for allowing me to participate in the care of your patient.        Sincerely,        Sabas Georges DO

## 2023-06-12 NOTE — PROGRESS NOTES
HPI: Deborah Mireles is a 83 year old female referred to see me by Emmanuelle Woodall for evaluation of gastroesophageal reflux disease.  She notes  a several months history of a chronic cough which is limiting her lifestyle.  She notes excessive phlegm which she attributes to stimulating the cough.  She denies any sinus infections or any significant reflux-like symptoms.  She is able to lay flat at night no cough.  She states the cough varies throughout the day and is not exacerbated by any particular foods or activities.  She does feel as though she is constantly clearing her throat.  Denies any heartburn, waterbrash or any GERD related symptoms.      Allergies:Patient has no known allergies.    Past Medical History:   Diagnosis Date     Atypical Chest Pain     Created by Conversion      Chronic cough 1/5/2015     Constipation      Coronary atherosclerosis      Dizziness and giddiness      Epistaxis      Essential hypertension      Gastroesophageal reflux disease without esophagitis 2/18/2016     Hereditary and idiopathic peripheral neuropathy      Hypermagnesemia     Created by Conversion Replacement Utility updated for latest IMO load      Impacted cerumen      Osteoarthrosis involving lower leg      Osteoporosis      Pain in joint, lower leg      Pseudotumor (inflammatory) of orbit, 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     Restless legs syndrome (RLS)      Seborrheic dermatitis, unspecified      Spinal stenosis, unspecified region other than cervical        Past Surgical History:   Procedure Laterality Date     ABDOMEN SURGERY       APPENDECTOMY       BACK SURGERY       CHOLECYSTECTOMY       COSMETIC SURGERY      blepharoplasty     EYE SURGERY       FRACTURE SURGERY       HC DILATION/CURETTAGE DIAG/THER NON OB      Description: Dilation And Curettage;  Recorded: 02/02/2010;  Comments: due to miscarriage     HC KNEE SCOPE, DIAGNOSTIC      Description: Arthroscopy Knee Right;  Recorded: 02/02/2010;  Comments: right knee  - arthroscopy for debridement; then Synvisc     LUMBAR FUSION  08/01/2013       CURRENT MEDS:    Current Outpatient Medications:      benzonatate (TESSALON) 100 MG capsule, , Disp: , Rfl:      calcium carbonate-vitamin D3 (CALCIUM 600 + D,3,) 600 mg(1,500mg) -200 unit per tablet, [CALCIUM CARBONATE-VITAMIN D3 (CALCIUM 600 + D,3,) 600 MG(1,500MG) -200 UNIT PER TABLET] Take 1 tablet by mouth 2 (two) times a day., Disp: , Rfl: 0     gabapentin (NEURONTIN) 300 MG capsule, TAKE 2 CAPSULES BY MOUTH THREE TIMES DAILY. GENERIC EQUIVALENT FOR NEURONTIN, Disp: 540 capsule, Rfl: 3     hydrochlorothiazide (HYDRODIURIL) 25 MG tablet, Take 1 tablet (25 mg) by mouth daily, Disp: 90 tablet, Rfl: 2     MULTIVITAMIN (MULTIPLE VITAMIN ORAL), [MULTIVITAMIN (MULTIPLE VITAMIN ORAL)] Take 1 tablet by mouth daily., Disp: , Rfl:      nortriptyline (PAMELOR) 75 MG capsule, Take 1 capsule (75 mg) by mouth At Bedtime, Disp: 90 capsule, Rfl: 3     fluticasone-salmeterol (ADVAIR) 250-50 MCG/ACT inhaler, Inhale 1 puff into the lungs every 12 hours (Patient not taking: Reported on 6/12/2023), Disp: 60 each, Rfl: 1     pantoprazole (PROTONIX) 40 MG EC tablet, Take 1 tablet (40 mg) by mouth daily for 90 days (Patient not taking: Reported on 6/12/2023), Disp: 90 tablet, Rfl: 0    No family history on file.     reports that she has never smoked. She has never used smokeless tobacco. She reports that she does not drink alcohol and does not use drugs.    Review of Systems:  The 12 system review is within normal limits except for as mentioned above.  General ROS: No complaints or constitutional symptoms  Ophthalmic ROS: No complaints of visual changes  Skin: No complaints or symptoms   Endocrine: No complaints or symptoms  Hematologic/Lymphatic: No symptoms or complaints  Psychiatric: No symptoms or complaints  Respiratory ROS: no cough, shortness of breath, or wheezing  Cardiovascular ROS: no chest pain or dyspnea on exertion  Gastrointestinal ROS: As  "per HPI  Genito-Urinary ROS: no dysuria, trouble voiding, or hematuria  Musculoskeletal ROS: no joint or muscle pain  Neurological ROS: no TIA or stroke symptoms      EXAM:  /82 (BP Location: Right arm, Patient Position: Sitting)   Ht 1.607 m (5' 3.25\")   Wt 67.5 kg (148 lb 14.4 oz)   BMI 26.17 kg/m    GENERAL: Well developed female, No acute distress, pleasant and conversant   EYES: Pupils equal, round and reactive, no scleral icterus  ABDOMEN: Soft, non-tender, no masses, non-distended  SKIN: Pink, warm and dry, no obvious rashes or lesions   NEURO:No focal deficits, ambulatory  MUSCULOSKELETAL:No obvious deformities, no swelling, normal appearing      LABS:  Lab Results   Component Value Date    WBC 6.7 09/08/2020    HGB 14.5 09/24/2021    HCT 42.4 09/08/2020    MCV 95 09/08/2020     09/08/2020     INR/Prothrombin Time  @LABRCNTIP(NA,K,CL,co2,bun,creatinine,labglom,glucose,calcium)@  Lab Results   Component Value Date    ALT 21 09/08/2020    AST 24 09/08/2020    ALKPHOS 68 09/08/2020       IMAGES:   Relevant images were reviewed and discussed with the patient.  Notable findings were: Esophagram images demonstrated moderate reflux    Assessment/Plan:   Deborah Mireles is a 83 year old female with signs and symptoms consistent with a chronic cough which may be attributed to reflux.  I have explained the pathophysiology of GERD in detail as well as the surgical versus non-operative management strategies.      At this point we will proceed with continued initial work-up.  This will include endoscopy to evaluate the distal esophagus as well as proximal esophagus.  There may be a component of allergies versus other upper cricopharyngeal muscular dysfunction which will require investigation in the future.    Sabas Georges, DO FACS  [phone]  [facility] Department of Surgery  "

## 2023-06-19 ENCOUNTER — OFFICE VISIT (OUTPATIENT)
Dept: FAMILY MEDICINE | Facility: CLINIC | Age: 83
End: 2023-06-19
Payer: COMMERCIAL

## 2023-06-19 VITALS
TEMPERATURE: 97.9 F | RESPIRATION RATE: 18 BRPM | HEIGHT: 63 IN | DIASTOLIC BLOOD PRESSURE: 80 MMHG | WEIGHT: 149 LBS | SYSTOLIC BLOOD PRESSURE: 135 MMHG | OXYGEN SATURATION: 97 % | BODY MASS INDEX: 26.4 KG/M2 | HEART RATE: 93 BPM

## 2023-06-19 DIAGNOSIS — G60.9 HEREDITARY AND IDIOPATHIC PERIPHERAL NEUROPATHY: ICD-10-CM

## 2023-06-19 DIAGNOSIS — Z01.818 PREOP EXAMINATION: Primary | ICD-10-CM

## 2023-06-19 DIAGNOSIS — R05.3 CHRONIC COUGH: ICD-10-CM

## 2023-06-19 DIAGNOSIS — I10 ESSENTIAL HYPERTENSION: ICD-10-CM

## 2023-06-19 DIAGNOSIS — R15.2 INCONTINENCE OF FECES WITH FECAL URGENCY: ICD-10-CM

## 2023-06-19 DIAGNOSIS — R15.9 INCONTINENCE OF FECES WITH FECAL URGENCY: ICD-10-CM

## 2023-06-19 DIAGNOSIS — M16.11 PRIMARY OSTEOARTHRITIS OF RIGHT HIP: ICD-10-CM

## 2023-06-19 LAB
ANION GAP SERPL CALCULATED.3IONS-SCNC: 14 MMOL/L (ref 7–15)
BUN SERPL-MCNC: 14.9 MG/DL (ref 8–23)
CALCIUM SERPL-MCNC: 10 MG/DL (ref 8.8–10.2)
CHLORIDE SERPL-SCNC: 101 MMOL/L (ref 98–107)
CREAT SERPL-MCNC: 0.85 MG/DL (ref 0.51–0.95)
DEPRECATED HCO3 PLAS-SCNC: 25 MMOL/L (ref 22–29)
ERYTHROCYTE [DISTWIDTH] IN BLOOD BY AUTOMATED COUNT: 13 % (ref 10–15)
GFR SERPL CREATININE-BSD FRML MDRD: 68 ML/MIN/1.73M2
GLUCOSE SERPL-MCNC: 96 MG/DL (ref 70–99)
HCT VFR BLD AUTO: 43 % (ref 35–47)
HGB BLD-MCNC: 13.9 G/DL (ref 11.7–15.7)
MCH RBC QN AUTO: 30.5 PG (ref 26.5–33)
MCHC RBC AUTO-ENTMCNC: 32.3 G/DL (ref 31.5–36.5)
MCV RBC AUTO: 95 FL (ref 78–100)
PLATELET # BLD AUTO: 282 10E3/UL (ref 150–450)
POTASSIUM SERPL-SCNC: 3.9 MMOL/L (ref 3.4–5.3)
RBC # BLD AUTO: 4.55 10E6/UL (ref 3.8–5.2)
SODIUM SERPL-SCNC: 140 MMOL/L (ref 136–145)
WBC # BLD AUTO: 5.7 10E3/UL (ref 4–11)

## 2023-06-19 PROCEDURE — 85027 COMPLETE CBC AUTOMATED: CPT | Performed by: FAMILY MEDICINE

## 2023-06-19 PROCEDURE — 80048 BASIC METABOLIC PNL TOTAL CA: CPT | Performed by: FAMILY MEDICINE

## 2023-06-19 PROCEDURE — 99214 OFFICE O/P EST MOD 30 MIN: CPT | Performed by: FAMILY MEDICINE

## 2023-06-19 PROCEDURE — 36415 COLL VENOUS BLD VENIPUNCTURE: CPT | Performed by: FAMILY MEDICINE

## 2023-06-19 NOTE — H&P (VIEW-ONLY)
[facility] [address] [address] [place] [address]  Phone: [phone]  Fax: [phone]  Primary Provider: Emmanuelle Woodall  Pre-op Performing Provider: EMMANUELLE WOODALL      PREOPERATIVE EVALUATION:  Today's date: 6/19/2023    Deborah Mireles is a 83 year old female who presents for a preoperative evaluation.      6/19/2023     9:46 AM   Additional Questions   Roomed by Margarita   Accompanied by Spouse     Surgical Information:  Surgery/Procedure: Esophagogastrodudodenoscopy , with biopsy  Surgery Location: [facility]  Surgeon: Dr. Georges  Surgery Date: 6/27/23  Time of Surgery: 10:45am  Where patient plans to recover: At home with family  Fax number for surgical facility: Note does not need to be faxed, will be available electronically in Epic.    Assessment & Plan     The proposed surgical procedure is considered LOW risk.    Problem List Items Addressed This Visit        Nervous and Auditory    Peripheral Neuropathy       Respiratory    Chronic cough       Circulatory    Hypertension    Relevant Orders    Basic metabolic panel  (Ca, Cl, CO2, Creat, Gluc, K, Na, BUN) (Completed)       Musculoskeletal and Integumentary    Primary osteoarthritis of right hip - s/p right hip replacement   Other Visit Diagnoses     Preop examination    -  Primary    Relevant Orders    CBC with platelets (Completed)    Basic metabolic panel  (Ca, Cl, CO2, Creat, Gluc, K, Na, BUN) (Completed)    Incontinence of feces with fecal urgency            This is an 84 yo female with chronic cough - she is having ongoing symptoms despite multiple attempts at treatment modalities.  Has been referred for EGD - patient does report she feels better the last few days.  But tells me she has felt better before.      Other problems:  1.  Incontinence of feces with fecal urgency - patient has occasional symptoms   2.  Hypertension - blood pressure is controlled at this time.   "  3.  Restless legs - chronic -   4.  Dry mouth -           - No identified additional risk factors other than previously addressed    Antiplatelet or Anticoagulation Medication Instructions:   - Patient is on no antiplatelet or anticoagulation medications.    Additional Medication Instructions:  will take HCTZ with small sip of water early in morning of surgery; otherwise hold medications until after surgery (will skip multivitamin, calcium that day); will skip morning Gabapentin dose.      RECOMMENDATION:  APPROVAL GIVEN to proceed with proposed procedure, without further diagnostic evaluation.      Subjective       HPI related to upcoming procedure: has had cough and phlegm for \"so long\" - \"it got so bad - I went to [facility]\".  Started to get better after that - still not coughing a lot, but has a lot of phlegm.  Has to cough it up.  Was in [facility] on 5/27   Had Prednisone/Albuterol HFA inhaler, Benzonatate.  Thinks it's a little better.  Seen by ENT, referred for EGD.              6/19/2023     9:40 AM   Preop Questions   1. Have you ever had a heart attack or stroke? No   2. Have you ever had surgery on your heart or blood vessels, such as a stent placement, a coronary artery bypass, or surgery on an artery in your head, neck, heart, or legs? No   3. Do you have chest pain with activity? No   4. Do you have a history of  heart failure? No   5. Do you currently have a cold, bronchitis or symptoms of other infection? No   6. Do you have a cough, shortness of breath, or wheezing? YES -chronic cough    7. Do you or anyone in your family have previous history of blood clots? No   8. Do you or does anyone in your family have a serious bleeding problem such as prolonged bleeding following surgeries or cuts? No   9. Have you ever had problems with anemia or been told to take iron pills? No   10. Have you had any abnormal blood loss such as black, tarry or bloody stools, or abnormal vaginal bleeding? No   11. Have you " ever had a blood transfusion? No   12. Are you willing to have a blood transfusion if it is medically needed before, during, or after your surgery? Yes   13. Have you or any of your relatives ever had problems with anesthesia? UNKNOWN - none known (no personal history)     14. Do you have sleep apnea, excessive snoring or daytime drowsiness? No   15. Do you have any artifical heart valves or other implanted medical devices like a pacemaker, defibrillator, or continuous glucose monitor? No   16. Do you have artificial joints? YES - right hip     17. Are you allergic to latex? No       Health Care Directive:  Patient has a Health Care Directive on file      Preoperative Review of :  Filled  Written  Sold  ID  Drug  QTY  Days  Prescriber    05/22/2023 02/28/2023 05/22/2023  1  Gabapentin 300 Mg Capsule 540.00  90  Ch Uls    02/28/2023 02/28/2023 02/28/2023  1  Gabapentin 300 Mg Capsule 540.00  90  Ch Uls    12/08/2022 04/01/2022 12/08/2022  1  Gabapentin 300 Mg Capsule 540.00  90  Ch Uls    08/24/2022 04/01/2022 08/24/2022  1  Gabapentin 300 Mg Capsule 540.00  90  Ch Uls         reviewed - controlled substances reflected in medication list.      Status of Chronic Conditions:  See problem list for active medical problems.  Problems all longstanding and stable, except as noted/documented.  See ROS for pertinent symptoms related to these conditions.      Review of Systems   Constitutional: Negative.  Negative for chills and fever.   HENT:        Dry mouth   Eyes: Negative for visual disturbance.   Respiratory: Positive for cough. Negative for shortness of breath.    Cardiovascular: Negative for peripheral edema.   Gastrointestinal: Positive for constipation.   Genitourinary: Negative.    Musculoskeletal: Positive for arthralgias.   Skin: Negative.    Allergic/Immunologic: Negative.    Neurological:        Restless legs     Hematological: Does not bruise/bleed easily.   Psychiatric/Behavioral: Negative.     All other systems reviewed and are negative.        Patient Active Problem List    Diagnosis Date Noted     Chronic cough 06/12/2023     Priority: Medium     Added automatically from request for surgery [id number]       Primary osteoarthritis of right hip - s/p right hip replacement 08/31/2021     Priority: Medium     Formatting of this note might be different from the original.  Added automatically from request for surgery [id number]    Formatting of this note might be different from the original.  Added automatically from request for surgery [id number]       Hallux limitus, right 11/02/2020     Priority: Medium     Formatting of this note might be different from the original.  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       Osteoporosis      Priority: Medium     Created by Conversion  Replacement Utility updated for latest IMO load         Peripheral Neuropathy      Priority: Medium     Created by Conversion  [facility] Annotation: Jan 31 2009  4:00PM - Chance Hussein: unclear etiology  Replacement Utility updated for latest IMO load         Asymptomatic Coronary Arteriosclerosis      Priority: Medium      Created by Conversion  Replacement Utility updated for latest IMO load         Hypertension      Priority: Medium     Created by Conversion  Replacement Utility updated for latest IMO load         Skin tag 12/25/2015     Priority: Medium     Restless Legs Syndrome      Priority: Medium     Created by Conversion         Onychomycosis Of The Toenails      Priority: Medium     Created by Conversion         Seborrheic Dermatitis Of The Scalp      Priority: Medium     Created by Conversion         Spinal Stenosis      Priority: Medium     Created by Conversion         Patellofemoral Syndrome      Priority: Medium     Created by Conversion  [facility] Annotation: Jan 31 2009  4:00PM - Emmanuelle Woodall:   steroid injection per Dr. Mattson          Past Medical History:   Diagnosis Date     Atypical Chest Pain     Created by Conversion      Chronic cough 01/05/2015     Constipation      Coronary atherosclerosis      Dizziness and giddiness      Epistaxis      Essential hypertension      Gastroesophageal reflux disease without esophagitis 02/18/2016     Hereditary and idiopathic peripheral neuropathy      Hypermagnesemia     Created by Conversion Replacement Utility updated for latest IMO load      Impacted cerumen      Osteoarthrosis involving lower leg      Osteoporosis      Pain in joint, lower leg      Pseudotumor (inflammatory) of orbit, 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     Restless legs syndrome (RLS)      Seborrheic dermatitis, unspecified      Spinal stenosis, unspecified region other than cervical      Past Surgical History:   Procedure Laterality Date     ABDOMEN SURGERY       APPENDECTOMY       BACK SURGERY       CHOLECYSTECTOMY       COSMETIC SURGERY      blepharoplasty     EYE SURGERY       FRACTURE SURGERY       HC DILATION/CURETTAGE DIAG/THER NON OB      Description: Dilation And Curettage;  Recorded: 02/02/2010;  Comments: due to miscarriage      KNEE SCOPE, DIAGNOSTIC      Description: Arthroscopy Knee  "Right;  Recorded: 02/02/2010;  Comments: right knee - arthroscopy for debridement; then Synvisc     LUMBAR FUSION  08/01/2013     Current Outpatient Medications   Medication Sig Dispense Refill     calcium carbonate-vitamin D3 (CALCIUM 600 + D,3,) 600 mg(1,500mg) -200 unit per tablet [CALCIUM CARBONATE-VITAMIN D3 (CALCIUM 600 + D,3,) 600 MG(1,500MG) -200 UNIT PER TABLET] Take 1 tablet by mouth 2 (two) times a day. (Patient taking differently: Take 1 tablet by mouth daily)  0     gabapentin (NEURONTIN) 300 MG capsule TAKE 2 CAPSULES BY MOUTH THREE TIMES DAILY. GENERIC EQUIVALENT FOR NEURONTIN 540 capsule 3     hydrochlorothiazide (HYDRODIURIL) 25 MG tablet Take 1 tablet (25 mg) by mouth daily 90 tablet 2     MULTIVITAMIN (MULTIPLE VITAMIN ORAL) [MULTIVITAMIN (MULTIPLE VITAMIN ORAL)] Take 1 tablet by mouth daily.       nortriptyline (PAMELOR) 75 MG capsule Take 1 capsule (75 mg) by mouth At Bedtime 90 capsule 3       No Known Allergies     Social History     Tobacco Use     Smoking status: Never     Smokeless tobacco: Never   Substance Use Topics     Alcohol use: No     History reviewed. No pertinent family history.  History   Drug Use No         Objective     /80 (BP Location: Right arm, Patient Position: Sitting, Cuff Size: Adult Regular)   Pulse 93   Temp 97.9  F (36.6  C) (Temporal)   Resp 18   Ht 1.6 m (5' 2.99\")   Wt 67.6 kg (149 lb)   SpO2 97%   BMI 26.40 kg/m      Physical Exam  Vitals reviewed.   Constitutional:       General: She is not in acute distress.     Appearance: Normal appearance.   HENT:      Head: Normocephalic.      Right Ear: Tympanic membrane, ear canal and external ear normal.      Left Ear: Tympanic membrane, ear canal and external ear normal.      Nose: Nose normal.      Mouth/Throat:      Mouth: Mucous membranes are moist.      Pharynx: No posterior oropharyngeal erythema.   Eyes:      Extraocular Movements: Extraocular movements intact.      Conjunctiva/sclera: Conjunctivae " normal.      Pupils: Pupils are equal, round, and reactive to light.   Cardiovascular:      Rate and Rhythm: Normal rate and regular rhythm.      Pulses: Normal pulses.      Heart sounds: Normal heart sounds. No murmur heard.  Pulmonary:      Effort: Pulmonary effort is normal.      Breath sounds: Normal breath sounds.   Abdominal:      Palpations: Abdomen is soft. There is no mass.      Tenderness: There is no abdominal tenderness. There is no guarding or rebound.   Musculoskeletal:         General: No deformity. Normal range of motion.      Cervical back: Normal range of motion and neck supple.   Lymphadenopathy:      Cervical: No cervical adenopathy.   Skin:     General: Skin is warm and dry.   Neurological:      General: No focal deficit present.      Mental Status: She is alert.   Psychiatric:         Mood and Affect: Mood normal.         Behavior: Behavior normal.           Recent Labs   Lab Test 05/04/23  1015 09/30/22  1345 04/26/22  1600 09/24/21  1208   HGB  --   --   --  14.5   NA  --  137 141 141   POTASSIUM  --  4.1 3.9 3.7   CR 0.8 0.88 0.83 0.87        Diagnostics:  Recent Results (from the past 240 hour(s))   CBC with platelets    Collection Time: 06/19/23 10:58 AM   Result Value Ref Range    WBC Count 5.7 4.0 - 11.0 10e3/uL    RBC Count 4.55 3.80 - 5.20 10e6/uL    Hemoglobin 13.9 11.7 - 15.7 g/dL    Hematocrit 43.0 35.0 - 47.0 %    MCV 95 78 - 100 fL    MCH 30.5 26.5 - 33.0 pg    MCHC 32.3 31.5 - 36.5 g/dL    RDW 13.0 10.0 - 15.0 %    Platelet Count 282 150 - 450 10e3/uL   Basic metabolic panel  (Ca, Cl, CO2, Creat, Gluc, K, Na, BUN)    Collection Time: 06/19/23 10:58 AM   Result Value Ref Range    Sodium 140 136 - 145 mmol/L    Potassium 3.9 3.4 - 5.3 mmol/L    Chloride 101 98 - 107 mmol/L    Carbon Dioxide (CO2) 25 22 - 29 mmol/L    Anion Gap 14 7 - 15 mmol/L    Urea Nitrogen 14.9 8.0 - 23.0 mg/dL    Creatinine 0.85 0.51 - 0.95 mg/dL    Calcium 10.0 8.8 - 10.2 mg/dL    Glucose 96 70 - 99 mg/dL     GFR Estimate 68 >60 mL/min/1.73m2          Revised Cardiac Risk Index (RCRI):  The patient has the following serious cardiovascular risks for perioperative complications:   - No serious cardiac risks = 0 points     RCRI Interpretation: 0 points: Class I (very low risk - 0.4% complication rate)           Signed Electronically by: DANICA LARA MD  Copy of this evaluation report is provided to requesting physician.        Prior to immunization administration, verified patients identity using patient s name and date of birth. Please see Immunization Activity for additional information.     Screening Questionnaire for Adult Immunization    Are you sick today?   No   Do you have allergies to medications, food, a vaccine component or latex?   No   Have you ever had a serious reaction after receiving a vaccination?   No   Do you have a long-term health problem with heart, lung, kidney, or metabolic disease (e.g., diabetes), asthma, a blood disorder, no spleen, complement component deficiency, a cochlear implant, or a spinal fluid leak?  Are you on long-term aspirin therapy?   No   Do you have cancer, leukemia, HIV/AIDS, or any other immune system problem?   No   Do you have a parent, brother, or sister with an immune system problem?   Don't Know   In the past 3 months, have you taken medications that affect  your immune system, such as prednisone, other steroids, or anticancer drugs; drugs for the treatment of rheumatoid arthritis, Crohn s disease, or psoriasis; or have you had radiation treatments?   Yes   Have you had a seizure, or a brain or other nervous system problem?   No   During the past year, have you received a transfusion of blood or blood    products, or been given immune (gamma) globulin or antiviral drug?   No   For women: Are you pregnant or is there a chance you could become       pregnant during the next month?   No   Have you received any vaccinations in the past 4 weeks?   No      Immunization questionnaire was positive for at least one answer.  Notified Dr. Cordero.      Patient instructed to remain in clinic for 15 minutes afterwards, and to report any adverse reactions.     Screening performed by Margarita Elizabeth CMA on 6/19/2023 at 9:50 AM.

## 2023-06-19 NOTE — PROGRESS NOTES
[facility] [address] [place] [address]  Phone: [phone]  Fax: [phone]  Primary Provider: Emmanuelle Woodall  Pre-op Performing Provider: EMMANUELLE WOODALL      PREOPERATIVE EVALUATION:  Today's date: 6/19/2023    Deborah Mireles is a 83 year old female who presents for a preoperative evaluation.      6/19/2023     9:46 AM   Additional Questions   Roomed by Margarita   Accompanied by Spouse     Surgical Information:  Surgery/Procedure: Esophagogastrodudodenoscopy , with biopsy  Surgery Location: [facility]  Surgeon: Dr. Georges  Surgery Date: 6/27/23  Time of Surgery: 10:45am  Where patient plans to recover: At home with family  Fax number for surgical facility: Note does not need to be faxed, will be available electronically in Epic.    Assessment & Plan     The proposed surgical procedure is considered LOW risk.    Problem List Items Addressed This Visit        Nervous and Auditory    Peripheral Neuropathy       Respiratory    Chronic cough       Circulatory    Hypertension    Relevant Orders    Basic metabolic panel  (Ca, Cl, CO2, Creat, Gluc, K, Na, BUN) (Completed)       Musculoskeletal and Integumentary    Primary osteoarthritis of right hip - s/p right hip replacement   Other Visit Diagnoses     Preop examination    -  Primary    Relevant Orders    CBC with platelets (Completed)    Basic metabolic panel  (Ca, Cl, CO2, Creat, Gluc, K, Na, BUN) (Completed)    Incontinence of feces with fecal urgency            This is an 82 yo female with chronic cough - she is having ongoing symptoms despite multiple attempts at treatment modalities.  Has been referred for EGD - patient does report she feels better the last few days.  But tells me she has felt better before.      Other problems:  1.  Incontinence of feces with fecal urgency - patient has occasional symptoms   2.  Hypertension - blood pressure is controlled at this time.   "  3.  Restless legs - chronic -   4.  Dry mouth -           - No identified additional risk factors other than previously addressed    Antiplatelet or Anticoagulation Medication Instructions:   - Patient is on no antiplatelet or anticoagulation medications.    Additional Medication Instructions:  will take HCTZ with small sip of water early in morning of surgery; otherwise hold medications until after surgery (will skip multivitamin, calcium that day); will skip morning Gabapentin dose.      RECOMMENDATION:  APPROVAL GIVEN to proceed with proposed procedure, without further diagnostic evaluation.      Subjective       HPI related to upcoming procedure: has had cough and phlegm for \"so long\" - \"it got so bad - I went to [facility]\".  Started to get better after that - still not coughing a lot, but has a lot of phlegm.  Has to cough it up.  Was in [facility] on 5/27   Had Prednisone/Albuterol HFA inhaler, Benzonatate.  Thinks it's a little better.  Seen by ENT, referred for EGD.              6/19/2023     9:40 AM   Preop Questions   1. Have you ever had a heart attack or stroke? No   2. Have you ever had surgery on your heart or blood vessels, such as a stent placement, a coronary artery bypass, or surgery on an artery in your head, neck, heart, or legs? No   3. Do you have chest pain with activity? No   4. Do you have a history of  heart failure? No   5. Do you currently have a cold, bronchitis or symptoms of other infection? No   6. Do you have a cough, shortness of breath, or wheezing? YES -chronic cough    7. Do you or anyone in your family have previous history of blood clots? No   8. Do you or does anyone in your family have a serious bleeding problem such as prolonged bleeding following surgeries or cuts? No   9. Have you ever had problems with anemia or been told to take iron pills? No   10. Have you had any abnormal blood loss such as black, tarry or bloody stools, or abnormal vaginal bleeding? No   11. Have you " ever had a blood transfusion? No   12. Are you willing to have a blood transfusion if it is medically needed before, during, or after your surgery? Yes   13. Have you or any of your relatives ever had problems with anesthesia? UNKNOWN - none known (no personal history)     14. Do you have sleep apnea, excessive snoring or daytime drowsiness? No   15. Do you have any artifical heart valves or other implanted medical devices like a pacemaker, defibrillator, or continuous glucose monitor? No   16. Do you have artificial joints? YES - right hip     17. Are you allergic to latex? No       Health Care Directive:  Patient has a Health Care Directive on file      Preoperative Review of :  Filled  Written  Sold  ID  Drug  QTY  Days  Prescriber    05/22/2023 02/28/2023 05/22/2023  1  Gabapentin 300 Mg Capsule 540.00  90  Ch Uls    02/28/2023 02/28/2023 02/28/2023  1  Gabapentin 300 Mg Capsule 540.00  90  Ch Uls    12/08/2022 04/01/2022 12/08/2022  1  Gabapentin 300 Mg Capsule 540.00  90  Ch Uls    08/24/2022 04/01/2022 08/24/2022  1  Gabapentin 300 Mg Capsule 540.00  90  Ch Uls         reviewed - controlled substances reflected in medication list.      Status of Chronic Conditions:  See problem list for active medical problems.  Problems all longstanding and stable, except as noted/documented.  See ROS for pertinent symptoms related to these conditions.      Review of Systems   Constitutional: Negative.  Negative for chills and fever.   HENT:        Dry mouth   Eyes: Negative for visual disturbance.   Respiratory: Positive for cough. Negative for shortness of breath.    Cardiovascular: Negative for peripheral edema.   Gastrointestinal: Positive for constipation.   Genitourinary: Negative.    Musculoskeletal: Positive for arthralgias.   Skin: Negative.    Allergic/Immunologic: Negative.    Neurological:        Restless legs     Hematological: Does not bruise/bleed easily.   Psychiatric/Behavioral: Negative.     All other systems reviewed and are negative.        Patient Active Problem List    Diagnosis Date Noted     Chronic cough 06/12/2023     Priority: Medium     Added automatically from request for surgery [id number]       Primary osteoarthritis of right hip - s/p right hip replacement 08/31/2021     Priority: Medium     Formatting of this note might be different from the original.  Added automatically from request for surgery [id number]    Formatting of this note might be different from the original.  Added automatically from request for surgery [id number]       Hallux limitus, right 11/02/2020     Priority: Medium     Formatting of this note might be different from the original.  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       Osteoporosis      Priority: Medium     Created by Conversion  Replacement Utility updated for latest IMO load         Peripheral Neuropathy      Priority: Medium     Created by Conversion  [facility] Annotation: Jan 31 2009  4:00PM - Chance Hussein: unclear etiology  Replacement Utility updated for latest IMO load         Asymptomatic Coronary Arteriosclerosis      Priority: Medium      Created by Conversion  Replacement Utility updated for latest IMO load         Hypertension      Priority: Medium     Created by Conversion  Replacement Utility updated for latest IMO load         Skin tag 12/25/2015     Priority: Medium     Restless Legs Syndrome      Priority: Medium     Created by Conversion         Onychomycosis Of The Toenails      Priority: Medium     Created by Conversion         Seborrheic Dermatitis Of The Scalp      Priority: Medium     Created by Conversion         Spinal Stenosis      Priority: Medium     Created by Conversion         Patellofemoral Syndrome      Priority: Medium     Created by Conversion  [facility] Annotation: Jan 31 2009  4:00PM - Emmanuelle Woodall:   steroid injection per Dr. Mattson          Past Medical History:   Diagnosis Date     Atypical Chest Pain     Created by Conversion      Chronic cough 01/05/2015     Constipation      Coronary atherosclerosis      Dizziness and giddiness      Epistaxis      Essential hypertension      Gastroesophageal reflux disease without esophagitis 02/18/2016     Hereditary and idiopathic peripheral neuropathy      Hypermagnesemia     Created by Conversion Replacement Utility updated for latest IMO load      Impacted cerumen      Osteoarthrosis involving lower leg      Osteoporosis      Pain in joint, lower leg      Pseudotumor (inflammatory) of orbit, 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     Restless legs syndrome (RLS)      Seborrheic dermatitis, unspecified      Spinal stenosis, unspecified region other than cervical      Past Surgical History:   Procedure Laterality Date     ABDOMEN SURGERY       APPENDECTOMY       BACK SURGERY       CHOLECYSTECTOMY       COSMETIC SURGERY      blepharoplasty     EYE SURGERY       FRACTURE SURGERY       HC DILATION/CURETTAGE DIAG/THER NON OB      Description: Dilation And Curettage;  Recorded: 02/02/2010;  Comments: due to miscarriage      KNEE SCOPE, DIAGNOSTIC      Description: Arthroscopy Knee  "Right;  Recorded: 02/02/2010;  Comments: right knee - arthroscopy for debridement; then Synvisc     LUMBAR FUSION  08/01/2013     Current Outpatient Medications   Medication Sig Dispense Refill     calcium carbonate-vitamin D3 (CALCIUM 600 + D,3,) 600 mg(1,500mg) -200 unit per tablet [CALCIUM CARBONATE-VITAMIN D3 (CALCIUM 600 + D,3,) 600 MG(1,500MG) -200 UNIT PER TABLET] Take 1 tablet by mouth 2 (two) times a day. (Patient taking differently: Take 1 tablet by mouth daily)  0     gabapentin (NEURONTIN) 300 MG capsule TAKE 2 CAPSULES BY MOUTH THREE TIMES DAILY. GENERIC EQUIVALENT FOR NEURONTIN 540 capsule 3     hydrochlorothiazide (HYDRODIURIL) 25 MG tablet Take 1 tablet (25 mg) by mouth daily 90 tablet 2     MULTIVITAMIN (MULTIPLE VITAMIN ORAL) [MULTIVITAMIN (MULTIPLE VITAMIN ORAL)] Take 1 tablet by mouth daily.       nortriptyline (PAMELOR) 75 MG capsule Take 1 capsule (75 mg) by mouth At Bedtime 90 capsule 3       No Known Allergies     Social History     Tobacco Use     Smoking status: Never     Smokeless tobacco: Never   Substance Use Topics     Alcohol use: No     History reviewed. No pertinent family history.  History   Drug Use No         Objective     /80 (BP Location: Right arm, Patient Position: Sitting, Cuff Size: Adult Regular)   Pulse 93   Temp 97.9  F (36.6  C) (Temporal)   Resp 18   Ht 1.6 m (5' 2.99\")   Wt 67.6 kg (149 lb)   SpO2 97%   BMI 26.40 kg/m      Physical Exam  Vitals reviewed.   Constitutional:       General: She is not in acute distress.     Appearance: Normal appearance.   HENT:      Head: Normocephalic.      Right Ear: Tympanic membrane, ear canal and external ear normal.      Left Ear: Tympanic membrane, ear canal and external ear normal.      Nose: Nose normal.      Mouth/Throat:      Mouth: Mucous membranes are moist.      Pharynx: No posterior oropharyngeal erythema.   Eyes:      Extraocular Movements: Extraocular movements intact.      Conjunctiva/sclera: Conjunctivae " normal.      Pupils: Pupils are equal, round, and reactive to light.   Cardiovascular:      Rate and Rhythm: Normal rate and regular rhythm.      Pulses: Normal pulses.      Heart sounds: Normal heart sounds. No murmur heard.  Pulmonary:      Effort: Pulmonary effort is normal.      Breath sounds: Normal breath sounds.   Abdominal:      Palpations: Abdomen is soft. There is no mass.      Tenderness: There is no abdominal tenderness. There is no guarding or rebound.   Musculoskeletal:         General: No deformity. Normal range of motion.      Cervical back: Normal range of motion and neck supple.   Lymphadenopathy:      Cervical: No cervical adenopathy.   Skin:     General: Skin is warm and dry.   Neurological:      General: No focal deficit present.      Mental Status: She is alert.   Psychiatric:         Mood and Affect: Mood normal.         Behavior: Behavior normal.           Recent Labs   Lab Test 05/04/23  1015 09/30/22  1345 04/26/22  1600 09/24/21  1208   HGB  --   --   --  14.5   NA  --  137 141 141   POTASSIUM  --  4.1 3.9 3.7   CR 0.8 0.88 0.83 0.87        Diagnostics:  Recent Results (from the past 240 hour(s))   CBC with platelets    Collection Time: 06/19/23 10:58 AM   Result Value Ref Range    WBC Count 5.7 4.0 - 11.0 10e3/uL    RBC Count 4.55 3.80 - 5.20 10e6/uL    Hemoglobin 13.9 11.7 - 15.7 g/dL    Hematocrit 43.0 35.0 - 47.0 %    MCV 95 78 - 100 fL    MCH 30.5 26.5 - 33.0 pg    MCHC 32.3 31.5 - 36.5 g/dL    RDW 13.0 10.0 - 15.0 %    Platelet Count 282 150 - 450 10e3/uL   Basic metabolic panel  (Ca, Cl, CO2, Creat, Gluc, K, Na, BUN)    Collection Time: 06/19/23 10:58 AM   Result Value Ref Range    Sodium 140 136 - 145 mmol/L    Potassium 3.9 3.4 - 5.3 mmol/L    Chloride 101 98 - 107 mmol/L    Carbon Dioxide (CO2) 25 22 - 29 mmol/L    Anion Gap 14 7 - 15 mmol/L    Urea Nitrogen 14.9 8.0 - 23.0 mg/dL    Creatinine 0.85 0.51 - 0.95 mg/dL    Calcium 10.0 8.8 - 10.2 mg/dL    Glucose 96 70 - 99 mg/dL     GFR Estimate 68 >60 mL/min/1.73m2          Revised Cardiac Risk Index (RCRI):  The patient has the following serious cardiovascular risks for perioperative complications:   - No serious cardiac risks = 0 points     RCRI Interpretation: 0 points: Class I (very low risk - 0.4% complication rate)           Signed Electronically by: DANICA LARA MD  Copy of this evaluation report is provided to requesting physician.        Prior to immunization administration, verified patients identity using patient s name and date of birth. Please see Immunization Activity for additional information.     Screening Questionnaire for Adult Immunization    Are you sick today?   No   Do you have allergies to medications, food, a vaccine component or latex?   No   Have you ever had a serious reaction after receiving a vaccination?   No   Do you have a long-term health problem with heart, lung, kidney, or metabolic disease (e.g., diabetes), asthma, a blood disorder, no spleen, complement component deficiency, a cochlear implant, or a spinal fluid leak?  Are you on long-term aspirin therapy?   No   Do you have cancer, leukemia, HIV/AIDS, or any other immune system problem?   No   Do you have a parent, brother, or sister with an immune system problem?   Don't Know   In the past 3 months, have you taken medications that affect  your immune system, such as prednisone, other steroids, or anticancer drugs; drugs for the treatment of rheumatoid arthritis, Crohn s disease, or psoriasis; or have you had radiation treatments?   Yes   Have you had a seizure, or a brain or other nervous system problem?   No   During the past year, have you received a transfusion of blood or blood    products, or been given immune (gamma) globulin or antiviral drug?   No   For women: Are you pregnant or is there a chance you could become       pregnant during the next month?   No   Have you received any vaccinations in the past 4 weeks?   No      Immunization questionnaire was positive for at least one answer.  Notified Dr. Cordero.      Patient instructed to remain in clinic for 15 minutes afterwards, and to report any adverse reactions.     Screening performed by Margarita Elizabeth CMA on 6/19/2023 at 9:50 AM.

## 2023-06-25 ASSESSMENT — ENCOUNTER SYMPTOMS
CONSTITUTIONAL NEGATIVE: 1
ARTHRALGIAS: 1
SHORTNESS OF BREATH: 0
CHILLS: 0
FEVER: 0
ALLERGIC/IMMUNOLOGIC NEGATIVE: 1
CONSTIPATION: 1
BRUISES/BLEEDS EASILY: 0
COUGH: 1
PSYCHIATRIC NEGATIVE: 1

## 2023-06-26 ENCOUNTER — ANESTHESIA EVENT (OUTPATIENT)
Dept: SURGERY | Facility: AMBULATORY SURGERY CENTER | Age: 83
End: 2023-06-26
Payer: COMMERCIAL

## 2023-06-27 ENCOUNTER — SURGERY (OUTPATIENT)
Age: 83
End: 2023-06-27
Payer: COMMERCIAL

## 2023-06-27 ENCOUNTER — HOSPITAL ENCOUNTER (OUTPATIENT)
Facility: AMBULATORY SURGERY CENTER | Age: 83
Discharge: HOME | End: 2023-06-27
Attending: SURGERY
Payer: COMMERCIAL

## 2023-06-27 ENCOUNTER — ANESTHESIA (OUTPATIENT)
Dept: SURGERY | Facility: AMBULATORY SURGERY CENTER | Age: 83
End: 2023-06-27
Payer: COMMERCIAL

## 2023-06-27 VITALS
HEART RATE: 86 BPM | RESPIRATION RATE: 14 BRPM | DIASTOLIC BLOOD PRESSURE: 76 MMHG | OXYGEN SATURATION: 97 % | SYSTOLIC BLOOD PRESSURE: 133 MMHG | TEMPERATURE: 97.5 F

## 2023-06-27 DIAGNOSIS — R05.3 CHRONIC COUGH: ICD-10-CM

## 2023-06-27 LAB — UPPER GI ENDOSCOPY: NORMAL

## 2023-06-27 PROCEDURE — 43239 EGD BIOPSY SINGLE/MULTIPLE: CPT | Performed by: SURGERY

## 2023-06-27 RX ORDER — PROPOFOL 10 MG/ML
INJECTION, EMULSION INTRAVENOUS PRN
Status: DISCONTINUED | OUTPATIENT
Start: 2023-06-27 | End: 2023-06-27

## 2023-06-27 RX ORDER — LIDOCAINE 40 MG/G
CREAM TOPICAL
Status: DISCONTINUED | OUTPATIENT
Start: 2023-06-27 | End: 2023-06-28 | Stop reason: HOSPADM

## 2023-06-27 RX ORDER — PROPOFOL 10 MG/ML
INJECTION, EMULSION INTRAVENOUS CONTINUOUS PRN
Status: DISCONTINUED | OUTPATIENT
Start: 2023-06-27 | End: 2023-06-27

## 2023-06-27 RX ORDER — ONDANSETRON 2 MG/ML
INJECTION INTRAMUSCULAR; INTRAVENOUS PRN
Status: DISCONTINUED | OUTPATIENT
Start: 2023-06-27 | End: 2023-06-27

## 2023-06-27 RX ORDER — OXYCODONE HYDROCHLORIDE 10 MG/1
10 TABLET ORAL
Status: DISCONTINUED | OUTPATIENT
Start: 2023-06-27 | End: 2023-06-28 | Stop reason: HOSPADM

## 2023-06-27 RX ORDER — LIDOCAINE HYDROCHLORIDE 20 MG/ML
INJECTION, SOLUTION INFILTRATION; PERINEURAL PRN
Status: DISCONTINUED | OUTPATIENT
Start: 2023-06-27 | End: 2023-06-27

## 2023-06-27 RX ORDER — ONDANSETRON 4 MG/1
4 TABLET, ORALLY DISINTEGRATING ORAL EVERY 30 MIN PRN
Status: DISCONTINUED | OUTPATIENT
Start: 2023-06-27 | End: 2023-06-28 | Stop reason: HOSPADM

## 2023-06-27 RX ORDER — OXYCODONE HYDROCHLORIDE 5 MG/1
5 TABLET ORAL
Status: DISCONTINUED | OUTPATIENT
Start: 2023-06-27 | End: 2023-06-28 | Stop reason: HOSPADM

## 2023-06-27 RX ORDER — ONDANSETRON 2 MG/ML
4 INJECTION INTRAMUSCULAR; INTRAVENOUS EVERY 30 MIN PRN
Status: DISCONTINUED | OUTPATIENT
Start: 2023-06-27 | End: 2023-06-28 | Stop reason: HOSPADM

## 2023-06-27 RX ORDER — SODIUM CHLORIDE, SODIUM LACTATE, POTASSIUM CHLORIDE, CALCIUM CHLORIDE 600; 310; 30; 20 MG/100ML; MG/100ML; MG/100ML; MG/100ML
INJECTION, SOLUTION INTRAVENOUS CONTINUOUS
Status: DISCONTINUED | OUTPATIENT
Start: 2023-06-27 | End: 2023-06-28 | Stop reason: HOSPADM

## 2023-06-27 RX ADMIN — LIDOCAINE HYDROCHLORIDE 2 ML: 20 INJECTION, SOLUTION INFILTRATION; PERINEURAL at 11:59

## 2023-06-27 RX ADMIN — ONDANSETRON 4 MG: 2 INJECTION INTRAMUSCULAR; INTRAVENOUS at 11:59

## 2023-06-27 RX ADMIN — PROPOFOL 30 MG: 10 INJECTION, EMULSION INTRAVENOUS at 11:59

## 2023-06-27 RX ADMIN — PROPOFOL 150 MCG/KG/MIN: 10 INJECTION, EMULSION INTRAVENOUS at 11:59

## 2023-06-27 RX ADMIN — SODIUM CHLORIDE, SODIUM LACTATE, POTASSIUM CHLORIDE, CALCIUM CHLORIDE: 600; 310; 30; 20 INJECTION, SOLUTION INTRAVENOUS at 11:35

## 2023-06-27 RX ADMIN — PROPOFOL 30 MG: 10 INJECTION, EMULSION INTRAVENOUS at 12:02

## 2023-06-27 NOTE — INTERVAL H&P NOTE
"I have reviewed the surgical (or preoperative) H&P that is linked to this encounter, and examined the patient. There are no significant changes    Clinical Conditions Present on Arrival:  Clinically Significant Risk Factors Present on Admission                  # Overweight: Estimated body mass index is 26.4 kg/m  as calculated from the following:    Height as of 6/19/23: 1.6 m (5' 2.99\").    Weight as of 6/19/23: 67.6 kg (149 lb).       Plan for egd and possible biopsise    Rao Georges [facility]  [phone]      "

## 2023-06-27 NOTE — ANESTHESIA CARE TRANSFER NOTE
Patient: Deborah Mireles    Procedure: Procedure(s):  ESOPHAGOGASTRODUODENOSCOPY, WITH BIOPSY       Diagnosis: Chronic cough [R05.3]  Diagnosis Additional Information: No value filed.    Anesthesia Type:   MAC     Note:    Oropharynx: oropharynx clear of all foreign objects  Level of Consciousness: awake  Oxygen Supplementation: room air    Independent Airway: airway patency satisfactory and stable  Dentition: dentition unchanged  Vital Signs Stable: post-procedure vital signs reviewed and stable  Report to RN Given: handoff report given  Patient transferred to: Phase II    Handoff Report: Identifed the Patient, Identified the Reponsible Provider, Reviewed the pertinent medical history, Discussed the surgical course, Reviewed Intra-OP anesthesia mangement and issues during anesthesia, Set expectations for post-procedure period and Allowed opportunity for questions and acknowledgement of understanding      Vitals:  Vitals Value Taken Time   /65 06/27/23 1215   Temp 97.5  F (36.4  C) 06/27/23 1213   Pulse 86 06/27/23 1218   Resp 14 06/27/23 1213   SpO2 92 % 06/27/23 1218   Vitals shown include unvalidated device data.    Electronically Signed By: JAD Brandon CRNA  June 27, 2023  12:22 PM

## 2023-06-27 NOTE — ANESTHESIA POSTPROCEDURE EVALUATION
Patient: Deborah Mireles    Procedure: Procedure(s):  ESOPHAGOGASTRODUODENOSCOPY, WITH BIOPSY       Anesthesia Type:  MAC    Note:  Disposition: Outpatient   Postop Pain Control: Uneventful            Sign Out: Well controlled pain   PONV: No   Neuro/Psych: Uneventful            Sign Out: Acceptable/Baseline neuro status   Airway/Respiratory: Uneventful            Sign Out: Acceptable/Baseline resp. status   CV/Hemodynamics: Uneventful            Sign Out: Acceptable CV status; No obvious hypovolemia; No obvious fluid overload   Other NRE:    DID A NON-ROUTINE EVENT OCCUR?            Last vitals:  Vitals Value Taken Time   /76 06/27/23 1231   Temp 97.5  F (36.4  C) 06/27/23 1213   Pulse 84 06/27/23 1238   Resp 14 06/27/23 1213   SpO2 96 % 06/27/23 1238   Vitals shown include unvalidated device data.    Electronically Signed By: Eric Reed MD  June 27, 2023  1:45 PM   This is a 41 year old Female PMHx  Asthma  Bipolar Disorder Borderline Personality Disorder  Cholelithiasis  Depression  Fibroids  GERD Obesity    BIBA for psych eval r/t suicidal ideations.  Medical evaluation performed. There is no clinical evidence of intoxication or any acute medical problem requiring immediate intervention. Final disposition will be determined by psychiatrist.

## 2023-06-27 NOTE — ANESTHESIA PREPROCEDURE EVALUATION
Anesthesia Pre-Procedure Evaluation    Patient: Deborah Mireles   MRN: [id number] : 1940        Procedure : Procedure(s):  ESOPHAGOGASTRODUODENOSCOPY, WITH BIOPSY          Past Medical History:   Diagnosis Date     Atypical Chest Pain     Created by Conversion      Chronic cough 2015     Constipation      Coronary atherosclerosis      Dizziness and giddiness      Epistaxis      Essential hypertension      Gastroesophageal reflux disease without esophagitis 2016     Hereditary and idiopathic peripheral neuropathy      Hypermagnesemia     Created by Conversion Replacement Utility updated for latest IMO load      Impacted cerumen      Osteoarthrosis involving lower leg      Osteoporosis      Pain in joint, lower leg      Pseudotumor (inflammatory) of orbit, right 2017     Restless legs syndrome (RLS)      Seborrheic dermatitis, unspecified      Spinal stenosis, unspecified region other than cervical       Past Surgical History:   Procedure Laterality Date     ABDOMEN SURGERY       APPENDECTOMY       BACK SURGERY       CHOLECYSTECTOMY       COSMETIC SURGERY      blepharoplasty     EYE SURGERY       FRACTURE SURGERY       HC DILATION/CURETTAGE DIAG/THER NON OB      Description: Dilation And Curettage;  Recorded: 2010;  Comments: due to miscarriage     HC KNEE SCOPE, DIAGNOSTIC      Description: Arthroscopy Knee Right;  Recorded: 2010;  Comments: right knee - arthroscopy for debridement; then Synvisc     LUMBAR FUSION  2013      No Known Allergies   Social History     Tobacco Use     Smoking status: Never     Smokeless tobacco: Never   Substance Use Topics     Alcohol use: No      Wt Readings from Last 1 Encounters:   23 67.6 kg (149 lb)        Anesthesia Evaluation            ROS/MED HX  ENT/Pulmonary:  - neg pulmonary ROS     Neurologic:     (+) peripheral neuropathy,     Cardiovascular:     (+) hypertension-----    METS/Exercise Tolerance: >4 METS    Hematologic:  -  neg hematologic  ROS     Musculoskeletal:   (+) arthritis,     GI/Hepatic:     (+) GERD, Asymptomatic on medication,     Renal/Genitourinary:  - neg Renal ROS     Endo:  - neg endo ROS     Psychiatric/Substance Use:  - neg psychiatric ROS     Infectious Disease:  - neg infectious disease ROS     Malignancy:  - neg malignancy ROS     Other:  - neg other ROS          Physical Exam    Airway  airway exam normal      Mallampati: II       Respiratory Devices and Support         Dental           Cardiovascular   cardiovascular exam normal       Rhythm and rate: regular and normal     Pulmonary   pulmonary exam normal        breath sounds clear to auscultation           OUTSIDE LABS:  CBC:   Lab Results   Component Value Date    WBC 5.7 06/19/2023    WBC 6.7 09/08/2020    HGB 13.9 06/19/2023    HGB 14.5 09/24/2021    HCT 43.0 06/19/2023    HCT 42.4 09/08/2020     06/19/2023     09/08/2020     BMP:   Lab Results   Component Value Date     06/19/2023     09/30/2022    POTASSIUM 3.9 06/19/2023    POTASSIUM 4.1 09/30/2022    CHLORIDE 101 06/19/2023    CHLORIDE 99 09/30/2022    CO2 25 06/19/2023    CO2 26 09/30/2022    BUN 14.9 06/19/2023    BUN 12.1 09/30/2022    CR 0.85 06/19/2023    CR 0.8 05/04/2023    GLC 96 06/19/2023    GLC 99 09/30/2022     COAGS: No results found for: PTT, INR, FIBR  POC: No results found for: BGM, HCG, HCGS  HEPATIC:   Lab Results   Component Value Date    ALBUMIN 3.7 09/08/2020    PROTTOTAL 7.5 09/08/2020    ALT 21 09/08/2020    AST 24 09/08/2020    ALKPHOS 68 09/08/2020    BILITOTAL 0.3 09/08/2020     OTHER:   Lab Results   Component Value Date    YANA 10.0 06/19/2023    MAG 2.1 09/08/2020    TSH 3.14 04/26/2022       Anesthesia Plan    ASA Status:  2      Anesthesia Type: MAC.   Induction: Intravenous.   Maintenance: TIVA.        Consents    Anesthesia Plan(s) and associated risks, benefits, and realistic alternatives discussed. Questions answered and  patient/representative(s) expressed understanding.    - Discussed:     - Discussed with:  Patient      - Extended Intubation/Ventilatory Support Discussed: No.      - Patient is DNR/DNI Status: No    Use of blood products discussed: No .     Postoperative Care    Pain management: IV analgesics.   PONV prophylaxis: Ondansetron (or other 5HT-3)     Comments:                Eric Reed MD

## 2023-07-05 ENCOUNTER — ALLIED HEALTH/NURSE VISIT (OUTPATIENT)
Dept: PULMONOLOGY | Facility: CLINIC | Age: 83
End: 2023-07-05
Attending: INTERNAL MEDICINE
Payer: COMMERCIAL

## 2023-07-05 VITALS
HEART RATE: 104 BPM | OXYGEN SATURATION: 97 % | SYSTOLIC BLOOD PRESSURE: 126 MMHG | HEIGHT: 63 IN | BODY MASS INDEX: 26.49 KG/M2 | WEIGHT: 149.5 LBS | DIASTOLIC BLOOD PRESSURE: 80 MMHG

## 2023-07-05 DIAGNOSIS — R05.3 CHRONIC COUGH: ICD-10-CM

## 2023-07-05 DIAGNOSIS — K21.9 GASTROESOPHAGEAL REFLUX DISEASE WITHOUT ESOPHAGITIS: Primary | ICD-10-CM

## 2023-07-05 DIAGNOSIS — K21.9 LARYNGOPHARYNGEAL REFLUX: ICD-10-CM

## 2023-07-05 LAB — HGB BLD-MCNC: 12.7 G/DL

## 2023-07-05 PROCEDURE — 99204 OFFICE O/P NEW MOD 45 MIN: CPT | Performed by: INTERNAL MEDICINE

## 2023-07-05 PROCEDURE — 94060 EVALUATION OF WHEEZING: CPT | Performed by: INTERNAL MEDICINE

## 2023-07-05 PROCEDURE — 94729 DIFFUSING CAPACITY: CPT | Performed by: INTERNAL MEDICINE

## 2023-07-05 PROCEDURE — 94726 PLETHYSMOGRAPHY LUNG VOLUMES: CPT | Performed by: INTERNAL MEDICINE

## 2023-07-05 PROCEDURE — 85018 HEMOGLOBIN: CPT

## 2023-07-05 NOTE — PROGRESS NOTES
Pulmonary Clinic Outpatient Consultation    Assessment and Plan:   83 year old female never smoker with a history of HTN, peripheral neuropathy, osteoporosis, RLS, spinal stenosis, osteoarthritis, chronic cough, recently diagnosed gastritis and hiatal hernia, presenting for evaluation.    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 DDx includes upper airway cough syndrome due to chronic rhinosinusitis (though history does not strongly support this), asthma (also no evidence of this by history, exam, or pulmonary function testing, which is normal). She is already on a decent dose of gabapentin for peripheral neuropathy, so this will not be a backup option for refractory idiopathic chronic cough.    Plan:  - start omeprazole 20 mg daily  - follow up in 3 months  - if partially effective, increase dose to 40 mg daily  - if ineffective, consider sinus CT, methacholine challenge, possible empiric trial of nasal steroid and oral antihistamine  - recommend remaining up to date with respiratory vaccinations  - encouraged her to contact us with questions or concerning symptoms    Lucas Lopez MD  [facility]  Office [phone]  Pager [phone]  he/him    CCx: chronic cough    HPI: 83 year old female never smoker with a history of HTN, peripheral neuropathy, osteoporosis, RLS, spinal stenosis, osteoarthritis, chronic cough, recently diagnosed gastritis and hiatal hernia, presenting for evaluation. Cough started gradually in December 2022. No clear precipitating events or illnesses. Feels excess phlegm in throat. Cough sometimes dry, sometimes produces greenish-yellow sputum. Does feel postnasal drainage but no sinus pressure or rhinorrhea. No burning chest pain or sour/metallic taste. Notes occasional wheezing. No dyspnea. Gradually worsened. Went to [facility] late May 2023, CXR was clear except for elevated right  hemidiaphragm. Was prescribed prednisone burst, benzonatate and albuterol HFA, all of which transiently helped. No known seasonal allergies. No childhood asthma. No toxin/fume exposures. She underwent EGD in late June 2023 which showed gastritis, likely hiatal hernia, and GE junction biopsy was benign.    ROS:  A 12-system review was obtained and was negative with the exception of the symptoms endorsed in the history of present illness.    PMH:  HTN  peripheral neuropathy  Osteoporosis  RLS  spinal stenosis  Osteoarthritis  chronic cough  Gastritis  hiatal hernia  Elevated right hemidiaphragm    PSH:  Past Surgical History:   Procedure Laterality Date     ABDOMEN SURGERY       APPENDECTOMY       BACK SURGERY       CHOLECYSTECTOMY       COSMETIC SURGERY      blepharoplasty     ESOPHAGOSCOPY, GASTROSCOPY, DUODENOSCOPY (EGD), COMBINED N/A 6/27/2023    Procedure: ESOPHAGOGASTRODUODENOSCOPY, WITH BIOPSY;  Surgeon: Sabas Georges DO;  Location: formerly [facility] OR     EYE SURGERY       FRACTURE SURGERY       HC DILATION/CURETTAGE DIAG/THER NON OB      Description: Dilation And Curettage;  Recorded: 02/02/2010;  Comments: due to miscarriage     HC KNEE SCOPE, DIAGNOSTIC      Description: Arthroscopy Knee Right;  Recorded: 02/02/2010;  Comments: right knee - arthroscopy for debridement; then Synvisc     LUMBAR FUSION  08/01/2013       Allergies:  No Known Allergies    Family HX:  No family history on file.    Social Hx:  Social History     Socioeconomic History     Marital status:      Spouse name: Not on file     Number of children: Not on file     Years of education: Not on file     Highest education level: Not on file   Occupational History     Not on file   Tobacco Use     Smoking status: Never     Smokeless tobacco: Never   Vaping Use     Vaping Use: Never used   Substance and Sexual Activity     Alcohol use: No     Drug use: No     Sexual activity: Not on file   Other Topics Concern     Not on file  "  Social History Narrative     Not on file     Social Determinants of Health     Financial Resource Strain: Not on file   Food Insecurity: Not on file   Transportation Needs: Not on file   Physical Activity: Not on file   Stress: Not on file   Social Connections: Not on file   Intimate Partner Violence: Not on file   Housing Stability: Not on file       Current Meds:  Current Outpatient Medications   Medication Sig Dispense Refill     calcium carbonate-vitamin D3 (CALCIUM 600 + D,3,) 600 mg(1,500mg) -200 unit per tablet [CALCIUM CARBONATE-VITAMIN D3 (CALCIUM 600 + D,3,) 600 MG(1,500MG) -200 UNIT PER TABLET] Take 1 tablet by mouth 2 (two) times a day. (Patient taking differently: Take 1 tablet by mouth daily)  0     gabapentin (NEURONTIN) 300 MG capsule TAKE 2 CAPSULES BY MOUTH THREE TIMES DAILY. GENERIC EQUIVALENT FOR NEURONTIN 540 capsule 3     hydrochlorothiazide (HYDRODIURIL) 25 MG tablet Take 1 tablet (25 mg) by mouth daily 90 tablet 2     MULTIVITAMIN (MULTIPLE VITAMIN ORAL) [MULTIVITAMIN (MULTIPLE VITAMIN ORAL)] Take 1 tablet by mouth daily.       nortriptyline (PAMELOR) 75 MG capsule Take 1 capsule (75 mg) by mouth At Bedtime 90 capsule 3     omeprazole (PRILOSEC) 20 MG DR capsule Take 1 capsule (20 mg) by mouth daily 30 capsule 11       Physical Exam:  /80 (BP Location: Left arm, Patient Position: Chair, Cuff Size: Adult Regular)   Pulse 104   Ht 1.6 m (5' 3\")   Wt 67.8 kg (149 lb 8 oz)   SpO2 97%   BMI 26.48 kg/m    Gen: alert, oriented, no distress  HEENT: nasal mucosa is unremarkable, no oropharyngeal lesions, no cervical or supraclavicular lymphadenopathy  CV: RRR, no M/G/R  Resp: CTAB, no focal crackles or wheezes  Skin: no apparent rashes  Ext: no cyanosis, clubbing or edema  Neuro: alert, nonfocal    Labs:  reviewed    Imaging:  CXR (May 2023):  - images directly reviewed, formal interpretation follows:    Heart and mediastinal size are normal. The right hemidiaphragm is elevated. This is " unchanged. Lungs and pleural spaces are clear. No pleural effusion or pneumothorax.    Pulmonary Function Testing  July 2023  FVC 2.41 (109%)  FEV1 2.09 (122%)  FEV1/FVC 0.87  No bronchodilator response.  RV 2.26 (102%)  TLC 4.93 (103%)  DLCOc 104%  Normal flow-volume loop    Time spent on chart and image review, meeting with the patient to obtain history, perform physical exam, discuss test results, diagnostic possibilities, further testing options, treatment plan options, and care coordination: 61 minutes

## 2023-07-10 ENCOUNTER — OFFICE VISIT (OUTPATIENT)
Dept: SURGERY | Facility: CLINIC | Age: 83
End: 2023-07-10
Payer: COMMERCIAL

## 2023-07-10 DIAGNOSIS — R05.3 CHRONIC COUGH: Primary | ICD-10-CM

## 2023-07-10 PROCEDURE — 99212 OFFICE O/P EST SF 10 MIN: CPT | Performed by: SURGERY

## 2023-07-10 NOTE — LETTER
7/10/2023         RE: Deborah Mireles  [address]  [place] [id number]        Dear Colleague,    Thank you for referring your patient, Deborah Mireles, to the [facility] [place]. Please see a copy of my visit note below.     HPI: Deborah Mireles is here for follow up after her EGD.  She has been started on 20 mg of omeprazole and states that she still has a cough although it is slightly improved.  She just started the medications last week.  She has no other complaints.      Allergies, Medications, Social History, Past Medical History and Past Surgical History were reviewed and are noted in the chart.    There were no vitals taken for this visit.  There is no height or weight on file to calculate BMI.      EXAM:   GENERAL: Appears well           Assessment/Plan: Deborah Mireles continues to have a cough which she states is relatively tolerable.  She is not interested in any surgical correction of her small hiatal hernia.  She did follow-up with her pulmonologist who appropriately started her on PPIs.  She will continue this for the next 3 months and will follow-up with me at any time if she has any further questions or concerns in terms of her hiatal hernia or reflux.  She is happy with maintaining her lifestyle at this point and is not interested in any further work-up in terms of her cough.    Sabas Georges DO [facility] Department of Surgery      Again, thank you for allowing me to participate in the care of your patient.        Sincerely,        Sabas Georges DO

## 2023-07-10 NOTE — PROGRESS NOTES
HPI: Deborah Mireles is here for follow up after her EGD.  She has been started on 20 mg of omeprazole and states that she still has a cough although it is slightly improved.  She just started the medications last week.  She has no other complaints.      Allergies, Medications, Social History, Past Medical History and Past Surgical History were reviewed and are noted in the chart.    There were no vitals taken for this visit.  There is no height or weight on file to calculate BMI.      EXAM:   GENERAL: Appears well           Assessment/Plan: Deborah Mireles continues to have a cough which she states is relatively tolerable.  She is not interested in any surgical correction of her small hiatal hernia.  She did follow-up with her pulmonologist who appropriately started her on PPIs.  She will continue this for the next 3 months and will follow-up with me at any time if she has any further questions or concerns in terms of her hiatal hernia or reflux.  She is happy with maintaining her lifestyle at this point and is not interested in any further work-up in terms of her cough.    Sabas Georges DO [facility] Department of Surgery

## 2023-08-06 ENCOUNTER — HEALTH MAINTENANCE LETTER (OUTPATIENT)
Age: 83
End: 2023-08-06

## 2023-08-31 LAB
DLCOCOR-%PRED-PRE: 104 %
DLCOCOR-PRE: 18.47 ML/MIN/MMHG
DLCOUNC-%PRED-PRE: 101 %
DLCOUNC-PRE: 18.06 ML/MIN/MMHG
DLCOUNC-PRED: 17.73 ML/MIN/MMHG
ERV-%PRED-PRE: 92 %
ERV-PRE: 0.42 L
ERV-PRED: 0.46 L
EXPTIME-PRE: 5.66 SEC
FEF2575-%PRED-POST: 241 %
FEF2575-%PRED-PRE: 185 %
FEF2575-POST: 3.36 L/SEC
FEF2575-PRE: 2.58 L/SEC
FEF2575-PRED: 1.39 L/SEC
FEFMAX-%PRED-PRE: 153 %
FEFMAX-PRE: 6.83 L/SEC
FEFMAX-PRED: 4.44 L/SEC
FEV1-%PRED-PRE: 122 %
FEV1-PRE: 2.09 L
FEV1FEV6-PRE: 87 %
FEV1FEV6-PRED: 77 %
FEV1FVC-PRE: 87 %
FEV1FVC-PRED: 78 %
FEV1SVC-PRE: 78 %
FEV1SVC-PRED: 66 %
FIFMAX-PRE: 3.21 L/SEC
FRCPLETH-%PRED-PRE: 100 %
FRCPLETH-PRE: 2.68 L
FRCPLETH-PRED: 2.67 L
FVC-%PRED-PRE: 109 %
FVC-PRE: 2.41 L
FVC-PRED: 2.21 L
IC-%PRED-PRE: 106 %
IC-PRE: 2.25 L
IC-PRED: 2.1 L
RVPLETH-%PRED-PRE: 102 %
RVPLETH-PRE: 2.26 L
RVPLETH-PRED: 2.21 L
TLCPLETH-%PRED-PRE: 103 %
TLCPLETH-PRE: 4.93 L
TLCPLETH-PRED: 4.77 L
VA-%PRED-PRE: 102 %
VA-PRE: 4.37 L
VC-%PRED-PRE: 104 %
VC-PRE: 2.67 L
VC-PRED: 2.56 L

## 2023-09-11 DIAGNOSIS — I10 ESSENTIAL HYPERTENSION: ICD-10-CM

## 2023-09-11 RX ORDER — HYDROCHLOROTHIAZIDE 25 MG/1
25 TABLET ORAL DAILY
Qty: 90 TABLET | Refills: 2 | Status: SHIPPED | OUTPATIENT
Start: 2023-09-11 | End: 2024-05-13

## 2023-09-24 DIAGNOSIS — G60.9 HEREDITARY AND IDIOPATHIC PERIPHERAL NEUROPATHY: ICD-10-CM

## 2023-09-25 RX ORDER — NORTRIPTYLINE HYDROCHLORIDE 75 MG/1
75 CAPSULE ORAL AT BEDTIME
Qty: 90 CAPSULE | Refills: 1 | Status: SHIPPED | OUTPATIENT
Start: 2023-09-25 | End: 2024-05-13

## 2023-09-25 NOTE — TELEPHONE ENCOUNTER
Warnings Override History for nortriptyline (PAMELOR) 75 MG capsule [[id number]]    Overridden by Sabas Georges DO on Jun 27, 2023 12:09 PM   Dose   1. NORTRIPTYLINE, 75 MG, ORAL, AT BEDTIME  DAILY DOSE 75 MG. OVERDOSE (MAX. 50 MG);  SINGLE DOSE 75 MG. OVERDOSE (MAX. 50 MG) [Reason: Tolerated medication/side effects in past]      Overridden by Emmanuelle Woodall MD on Oct 28, 2022 3:06 PM   Dose   1. NORTRIPTYLINE, 75 MG, ORAL, AT BEDTIME  DAILY DOSE 75 MG. OVERDOSE (MAX. 50 MG);  SINGLE DOSE 75 MG. OVERDOSE (MAX. 50 MG) [Reason: Tolerated medication/side effects in past]

## 2023-10-05 ENCOUNTER — OFFICE VISIT (OUTPATIENT)
Dept: PULMONOLOGY | Facility: CLINIC | Age: 83
End: 2023-10-05
Payer: COMMERCIAL

## 2023-10-05 VITALS
HEART RATE: 114 BPM | WEIGHT: 151 LBS | BODY MASS INDEX: 26.75 KG/M2 | OXYGEN SATURATION: 98 % | SYSTOLIC BLOOD PRESSURE: 138 MMHG | DIASTOLIC BLOOD PRESSURE: 86 MMHG

## 2023-10-05 DIAGNOSIS — R05.3 CHRONIC COUGH: Primary | ICD-10-CM

## 2023-10-05 PROCEDURE — 99214 OFFICE O/P EST MOD 30 MIN: CPT | Performed by: INTERNAL MEDICINE

## 2023-10-05 RX ORDER — GUAIFENESIN 600 MG/1
1200 TABLET, EXTENDED RELEASE ORAL 2 TIMES DAILY PRN
Qty: 120 TABLET | Refills: 11 | Status: SHIPPED | OUTPATIENT
Start: 2023-10-05 | End: 2023-10-27

## 2023-10-05 RX ORDER — FLUTICASONE PROPIONATE 50 MCG
SPRAY, SUSPENSION (ML) NASAL
Qty: 48 G | Refills: 3 | Status: SHIPPED | OUTPATIENT
Start: 2023-10-05 | End: 2024-01-15

## 2023-10-05 RX ORDER — LORATADINE 10 MG/1
10 TABLET ORAL DAILY
Qty: 90 TABLET | Refills: 3 | Status: SHIPPED | OUTPATIENT
Start: 2023-10-05 | End: 2023-10-27

## 2023-10-05 NOTE — LETTER
10/5/2023         RE: Deborah Azevedoaicha  [address] [facility] [address]        Dear Colleague,    Thank you for referring your patient, Deborah Mireles, to the [facility] [place]. Please see a copy of my visit note below.    Pulmonary Clinic Follow-up Visit    Assessment and Plan:   83 year old female never smoker with a history of HTN, peripheral neuropathy, osteoporosis, RLS, spinal stenosis, osteoarthritis, chronic cough, gastritis and hiatal hernia, presenting for follow-up.     Chronic cough: Not better with daily omeprazole 20 mg. May be worse. Coughs all day, either dry or with phlegm in the throat. Occasional postnasal drainage, but not much. Feels like phlegm is coming into the throat. She is very frustrated by it. CT coronary calcium scoring in May 2023 did not show parenchymal lung disease. PFT was normal. Her nasal turbinates appear somewhat edematous; may have rhinosinusitis causing upper airway cough syndrome. Will try empiric nasal steroid and oral nonsedating antihistamine for possible upper airway cough syndrome, obtain sinus CT and methacholine challenge, consider Bravo pH probe if above ineffective/unrevealing. She is already on a decent dose of gabapentin for peripheral neuropathy, but could increase further in attempt to suppress cough if nothing else works.     Plan:  - start nasal fluticasone one spray in each nostril daily  - start loratadine 10 mg daily  - sinus CT  - methacholine challenge  - likely to need esophageal impedance-pH probe at some point if cough remains refractory, especially as her esophagram and EGD were quite abnormal, with reflux to the thoracic inlet, food in upper esophagus, gastritis, hiatal hernia; consider referral to GI at [facility] or [place]; could consider [facility]  - for now she wants to stop omeprazole as it has not helped  - could increase gabapentin further for chronic cough, though already on 600 mg TID  - recommend  remaining up to date with respiratory vaccinations  - follow up in 3 months  - encouraged her to contact us with questions or concerning symptoms    Lucas Lopez MD  Pulmonary and Critical Care Medicine  [facility]  Office [phone]  Pager [phone]  he/him    CCx: chronic cough    HPI: 83 year old female never smoker with a history of HTN, peripheral neuropathy, osteoporosis, RLS, spinal stenosis, osteoarthritis, chronic cough, gastritis and hiatal hernia, presenting for follow-up. Not better with daily omeprazole 20 mg. May be worse. Coughs all day, either dry or with phlegm in the throat. Occasional postnasal drainage, but not much. Feels like phlegm is coming into the throat. She is very frustrated by it. CT coronary calcium scoring in May 2023 did not show parenchymal lung disease. PFT was normal. Will try empiric nasal steroid and oral nonsedating antihistamine for possible upper airway cough syndrome, obtain sinus CT and methacholine challenge, consider Bravo pH probe if above ineffective/unrevealing. She is already on a decent dose of gabapentin for peripheral neuropathy, but could increase further in attempt to suppress cough if nothing else works.    ROS:  A 12-system review was obtained and was negative with the exception of the symptoms endorsed in the history of present illness.    PMH:  HTN  peripheral neuropathy  Osteoporosis  RLS  spinal stenosis  Osteoarthritis  chronic cough  Gastritis  hiatal hernia  Elevated right hemidiaphragm    PSH:  Past Surgical History:   Procedure Laterality Date     ABDOMEN SURGERY       APPENDECTOMY       BACK SURGERY       CHOLECYSTECTOMY       COSMETIC SURGERY      blepharoplasty     ESOPHAGOSCOPY, GASTROSCOPY, DUODENOSCOPY (EGD), COMBINED N/A 6/27/2023    Procedure: ESOPHAGOGASTRODUODENOSCOPY, WITH BIOPSY;  Surgeon: Sabas Georges DO;  Location: [facility] OR     EYE SURGERY       FRACTURE SURGERY       HC DILATION/CURETTAGE  DIAG/THER NON OB      Description: Dilation And Curettage;  Recorded: 02/02/2010;  Comments: due to miscarriage     HC KNEE SCOPE, DIAGNOSTIC      Description: Arthroscopy Knee Right;  Recorded: 02/02/2010;  Comments: right knee - arthroscopy for debridement; then Synvisc     LUMBAR FUSION  08/01/2013       Allergies:  No Known Allergies    Family HX:  No family history on file.    Social Hx:  Social History     Socioeconomic History     Marital status:      Spouse name: Not on file     Number of children: Not on file     Years of education: Not on file     Highest education level: Not on file   Occupational History     Not on file   Tobacco Use     Smoking status: Never     Smokeless tobacco: Never   Vaping Use     Vaping Use: Never used   Substance and Sexual Activity     Alcohol use: No     Drug use: No     Sexual activity: Not on file   Other Topics Concern     Not on file   Social History Narrative     Not on file     Social Determinants of Health     Financial Resource Strain: Not on file   Food Insecurity: Not on file   Transportation Needs: Not on file   Physical Activity: Not on file   Stress: Not on file   Social Connections: Not on file   Interpersonal Safety: Not on file   Housing Stability: Not on file       Current Meds:  Current Outpatient Medications   Medication Sig Dispense Refill     calcium carbonate-vitamin D3 (CALCIUM 600 + D,3,) 600 mg(1,500mg) -200 unit per tablet [CALCIUM CARBONATE-VITAMIN D3 (CALCIUM 600 + D,3,) 600 MG(1,500MG) -200 UNIT PER TABLET] Take 1 tablet by mouth 2 (two) times a day. (Patient taking differently: Take 1 tablet by mouth daily)  0     fluticasone (FLONASE) 50 MCG/ACT nasal spray One spray in each nostril daily 48 g 3     gabapentin (NEURONTIN) 300 MG capsule TAKE 2 CAPSULES BY MOUTH THREE TIMES DAILY. GENERIC EQUIVALENT FOR NEURONTIN 540 capsule 3     guaiFENesin (MUCINEX) 600 MG 12 hr tablet Take 2 tablets (1,200 mg) by mouth 2 times daily as needed for cough  120 tablet 11     hydrochlorothiazide (HYDRODIURIL) 25 MG tablet TAKE 1 TABLET BY MOUTH DAILY 90 tablet 2     loratadine (CLARITIN) 10 MG tablet Take 1 tablet (10 mg) by mouth daily 90 tablet 3     MULTIVITAMIN (MULTIPLE VITAMIN ORAL) [MULTIVITAMIN (MULTIPLE VITAMIN ORAL)] Take 1 tablet by mouth daily.       nortriptyline (PAMELOR) 75 MG capsule TAKE 1 CAPSULE BY MOUTH AT BEDTIME 90 capsule 1       Physical Exam:  /86 (BP Location: Left arm, Patient Position: Chair, Cuff Size: Adult Regular)   Pulse 114   Wt 68.5 kg (151 lb)   SpO2 98%   BMI 26.75 kg/m    Gen: alert, oriented, no distress  HEENT: nasal mucosa is unremarkable, no oropharyngeal lesions, no cervical or supraclavicular lymphadenopathy  CV: RRR, no M/G/R  Resp: CTAB, no focal crackles or wheezes  Skin: no apparent rashes  Ext: no cyanosis, clubbing or edema  Neuro: alert, nonfocal    Labs:  reviewed    Imaging:  CXR (May 2023):  - images directly reviewed, formal interpretation follows:     Heart and mediastinal size are normal. The right hemidiaphragm is elevated. This is unchanged. Lungs and pleural spaces are clear. No pleural effusion or pneumothorax.    CT coronary calcium (May 2023):  - images directly reviewed, formal interpretation follows:  FINDINGS:       LIMITED CHEST: Stable elevated right hemidiaphragm.     LIMITED MEDIASTINUM: Negative.     LIMITED UPPER ABDOMEN: Negative.                                                                      IMPRESSION:    1.  No significant incidental extracardiac findings.  2.  Please refer to cardiologist's dictation for the cardiac CT report.    Esophagram (May 2023):  FINDINGS:   FLUOROSCOPIC TIME: 3.3  NUMBER OF IMAGES: 16      ESOPHAGUS:  No aspiration. There is slight narrowing of the proximal esophagus on lateral imaging, though a 12.5 mm tablet traversed this area without delay. This could represent a cricopharyngeus muscle impression. No other potential strictures. Primary   and  secondary peristalsis result in emptying of the esophagus, though mild proximal escape is noted. Intermittently, large volumes of fluid reflux to the thoracic inlet. No hiatal hernia appreciated. No mucosal abnormalities or masses.  Elevated right   hemidiaphragm.                                                                   IMPRESSION:   1.  Intermittent reflux of fluid to the thoracic inlet, which could contribute to coughing. No hiatal hernia appreciated.  2.  No definite strictures. There is narrowing of the proximal esophagus on the lateral view though this could be from a cricopharyngeus muscle impression. A 12.5 mm tablet traversed this region without delay. Consider direct visualization if there is   concern for narrowing in this region.  3.  No aspiration on this study.  4.  Elevated right hemidiaphragm.    EGD (June 2023):  Findings:         -  The second portion of the duodenum was normal.          -  Patchy mild inflammation characterized by erythema was found in the   gastric            antrum and in the gastric body.          -  The gastroesophageal flap valve was visualized endoscopically and             classified as Hill Grade III (minimal fold, loose to endoscope, hiatal    hernia             likely).          -  The Z-line was regular and was found 40 cm from the incisors.    Biopsies            were taken with a cold forceps for histology.          -  The cricopharyngeus was normal.  mild mucous and particulate food   matter in             upper esophagus and cricopharyngeal region. no obvious lesions   Impression:         -  Normal second portion of the duodenum.          -  Gastritis.          -  Gastroesophageal flap valve classified as Hill Grade III (minimal   fold,            loose to endoscope, hiatal hernia likely).          -  Z-line regular, 40 cm from the incisors.  Biopsied.          -  Normal cricopharyngeus.          - mild mucous and particulate food matter in upper esophagus  and             cricopharyngeal region. no obvious lesions      Pulmonary Function Testing  July 2023  FVC 2.41 (109%)  FEV1 2.09 (122%)  FEV1/FVC 0.87  No bronchodilator response.  RV 2.26 (102%)  TLC 4.93 (103%)  DLCOc 104%  Normal flow-volume loop    Time spent on chart and image review, meeting with the patient to obtain history, perform physical exam, discuss test results, diagnostic possibilities, further testing options, treatment plan options, and care coordination: 32 minutes      Again, thank you for allowing me to participate in the care of your patient.        Sincerely,        Lucas Lopez MD

## 2023-10-05 NOTE — PATIENT INSTRUCTIONS
It was good to see you in clinic today. This is what we discussed:    There are many causes of chronic cough and it will take some effort.  Stop omeprazole.  Start nasal fluticasone one spray in each nostril daily.  Start taking loratadine (a nonsedating antihistamine) one pill daily.  Take guaifenesin 2 pills twice daily as needed for the cough.  We will get a sinus CT.  We will get a breathing test looking for asthma.  I will contact you with results.  I will see you in 3 months.  Contact me with questions or concerning symptoms.    Lucas Lopez MD  Pulmonary and Critical Care Medicine  [facility]  Office [phone]

## 2023-10-05 NOTE — PROGRESS NOTES
Pulmonary Clinic Follow-up Visit    Assessment and Plan:   83 year old female never smoker with a history of HTN, peripheral neuropathy, osteoporosis, RLS, spinal stenosis, osteoarthritis, chronic cough, gastritis and hiatal hernia, presenting for follow-up.     Chronic cough: Not better with daily omeprazole 20 mg. May be worse. Coughs all day, either dry or with phlegm in the throat. Occasional postnasal drainage, but not much. Feels like phlegm is coming into the throat. She is very frustrated by it. CT coronary calcium scoring in May 2023 did not show parenchymal lung disease. PFT was normal. Her nasal turbinates appear somewhat edematous; may have rhinosinusitis causing upper airway cough syndrome. Will try empiric nasal steroid and oral nonsedating antihistamine for possible upper airway cough syndrome, obtain sinus CT and methacholine challenge, consider Bravo pH probe if above ineffective/unrevealing. She is already on a decent dose of gabapentin for peripheral neuropathy, but could increase further in attempt to suppress cough if nothing else works.     Plan:  - start nasal fluticasone one spray in each nostril daily  - start loratadine 10 mg daily  - sinus CT  - methacholine challenge  - likely to need esophageal impedance-pH probe at some point if cough remains refractory, especially as her esophagram and EGD were quite abnormal, with reflux to the thoracic inlet, food in upper esophagus, gastritis, hiatal hernia; consider referral to GI at [facility] or [place]; could consider [facility]  - for now she wants to stop omeprazole as it has not helped  - could increase gabapentin further for chronic cough, though already on 600 mg TID  - recommend remaining up to date with respiratory vaccinations  - follow up in 3 months  - encouraged her to contact us with questions or concerning symptoms    Lucas Lopez MD  Pulmonary and Critical Care Medicine  [facility]  Office [phone]  Pager  [phone]  he/him    CCx: chronic cough    HPI: 83 year old female never smoker with a history of HTN, peripheral neuropathy, osteoporosis, RLS, spinal stenosis, osteoarthritis, chronic cough, gastritis and hiatal hernia, presenting for follow-up. Not better with daily omeprazole 20 mg. May be worse. Coughs all day, either dry or with phlegm in the throat. Occasional postnasal drainage, but not much. Feels like phlegm is coming into the throat. She is very frustrated by it. CT coronary calcium scoring in May 2023 did not show parenchymal lung disease. PFT was normal. Will try empiric nasal steroid and oral nonsedating antihistamine for possible upper airway cough syndrome, obtain sinus CT and methacholine challenge, consider Bravo pH probe if above ineffective/unrevealing. She is already on a decent dose of gabapentin for peripheral neuropathy, but could increase further in attempt to suppress cough if nothing else works.    ROS:  A 12-system review was obtained and was negative with the exception of the symptoms endorsed in the history of present illness.    PMH:  HTN  peripheral neuropathy  Osteoporosis  RLS  spinal stenosis  Osteoarthritis  chronic cough  Gastritis  hiatal hernia  Elevated right hemidiaphragm    PSH:  Past Surgical History:   Procedure Laterality Date    ABDOMEN SURGERY      APPENDECTOMY      BACK SURGERY      CHOLECYSTECTOMY      COSMETIC SURGERY      blepharoplasty    ESOPHAGOSCOPY, GASTROSCOPY, DUODENOSCOPY (EGD), COMBINED N/A 6/27/2023    Procedure: ESOPHAGOGASTRODUODENOSCOPY, WITH BIOPSY;  Surgeon: Sabas Georges DO;  Location: [facility] OR    EYE SURGERY      FRACTURE SURGERY      HC DILATION/CURETTAGE DIAG/THER NON OB      Description: Dilation And Curettage;  Recorded: 02/02/2010;  Comments: due to miscarriage    HC KNEE SCOPE, DIAGNOSTIC      Description: Arthroscopy Knee Right;  Recorded: 02/02/2010;  Comments: right knee - arthroscopy for debridement; then Synvisc     LUMBAR FUSION  08/01/2013       Allergies:  No Known Allergies    Family HX:  No family history on file.    Social Hx:  Social History     Socioeconomic History    Marital status:      Spouse name: Not on file    Number of children: Not on file    Years of education: Not on file    Highest education level: Not on file   Occupational History    Not on file   Tobacco Use    Smoking status: Never    Smokeless tobacco: Never   Vaping Use    Vaping Use: Never used   Substance and Sexual Activity    Alcohol use: No    Drug use: No    Sexual activity: Not on file   Other Topics Concern    Not on file   Social History Narrative    Not on file     Social Determinants of Health     Financial Resource Strain: Not on file   Food Insecurity: Not on file   Transportation Needs: Not on file   Physical Activity: Not on file   Stress: Not on file   Social Connections: Not on file   Interpersonal Safety: Not on file   Housing Stability: Not on file       Current Meds:  Current Outpatient Medications   Medication Sig Dispense Refill    calcium carbonate-vitamin D3 (CALCIUM 600 + D,3,) 600 mg(1,500mg) -200 unit per tablet [CALCIUM CARBONATE-VITAMIN D3 (CALCIUM 600 + D,3,) 600 MG(1,500MG) -200 UNIT PER TABLET] Take 1 tablet by mouth 2 (two) times a day. (Patient taking differently: Take 1 tablet by mouth daily)  0    fluticasone (FLONASE) 50 MCG/ACT nasal spray One spray in each nostril daily 48 g 3    gabapentin (NEURONTIN) 300 MG capsule TAKE 2 CAPSULES BY MOUTH THREE TIMES DAILY. GENERIC EQUIVALENT FOR NEURONTIN 540 capsule 3    guaiFENesin (MUCINEX) 600 MG 12 hr tablet Take 2 tablets (1,200 mg) by mouth 2 times daily as needed for cough 120 tablet 11    hydrochlorothiazide (HYDRODIURIL) 25 MG tablet TAKE 1 TABLET BY MOUTH DAILY 90 tablet 2    loratadine (CLARITIN) 10 MG tablet Take 1 tablet (10 mg) by mouth daily 90 tablet 3    MULTIVITAMIN (MULTIPLE VITAMIN ORAL) [MULTIVITAMIN (MULTIPLE VITAMIN ORAL)] Take 1 tablet by mouth  daily.      nortriptyline (PAMELOR) 75 MG capsule TAKE 1 CAPSULE BY MOUTH AT BEDTIME 90 capsule 1       Physical Exam:  /86 (BP Location: Left arm, Patient Position: Chair, Cuff Size: Adult Regular)   Pulse 114   Wt 68.5 kg (151 lb)   SpO2 98%   BMI 26.75 kg/m    Gen: alert, oriented, no distress  HEENT: nasal mucosa is unremarkable, no oropharyngeal lesions, no cervical or supraclavicular lymphadenopathy  CV: RRR, no M/G/R  Resp: CTAB, no focal crackles or wheezes  Skin: no apparent rashes  Ext: no cyanosis, clubbing or edema  Neuro: alert, nonfocal    Labs:  reviewed    Imaging:  CXR (May 2023):  - images directly reviewed, formal interpretation follows:     Heart and mediastinal size are normal. The right hemidiaphragm is elevated. This is unchanged. Lungs and pleural spaces are clear. No pleural effusion or pneumothorax.    CT coronary calcium (May 2023):  - images directly reviewed, formal interpretation follows:  FINDINGS:       LIMITED CHEST: Stable elevated right hemidiaphragm.     LIMITED MEDIASTINUM: Negative.     LIMITED UPPER ABDOMEN: Negative.                                                                      IMPRESSION:    1.  No significant incidental extracardiac findings.  2.  Please refer to cardiologist's dictation for the cardiac CT report.    Esophagram (May 2023):  FINDINGS:   FLUOROSCOPIC TIME: 3.3  NUMBER OF IMAGES: 16      ESOPHAGUS:  No aspiration. There is slight narrowing of the proximal esophagus on lateral imaging, though a 12.5 mm tablet traversed this area without delay. This could represent a cricopharyngeus muscle impression. No other potential strictures. Primary   and secondary peristalsis result in emptying of the esophagus, though mild proximal escape is noted. Intermittently, large volumes of fluid reflux to the thoracic inlet. No hiatal hernia appreciated. No mucosal abnormalities or masses.  Elevated right   hemidiaphragm.                                                                    IMPRESSION:   1.  Intermittent reflux of fluid to the thoracic inlet, which could contribute to coughing. No hiatal hernia appreciated.  2.  No definite strictures. There is narrowing of the proximal esophagus on the lateral view though this could be from a cricopharyngeus muscle impression. A 12.5 mm tablet traversed this region without delay. Consider direct visualization if there is   concern for narrowing in this region.  3.  No aspiration on this study.  4.  Elevated right hemidiaphragm.    EGD (June 2023):  Findings:         -  The second portion of the duodenum was normal.          -  Patchy mild inflammation characterized by erythema was found in the   gastric            antrum and in the gastric body.          -  The gastroesophageal flap valve was visualized endoscopically and             classified as Hill Grade III (minimal fold, loose to endoscope, hiatal    hernia             likely).          -  The Z-line was regular and was found 40 cm from the incisors.    Biopsies            were taken with a cold forceps for histology.          -  The cricopharyngeus was normal.  mild mucous and particulate food   matter in             upper esophagus and cricopharyngeal region. no obvious lesions   Impression:         -  Normal second portion of the duodenum.          -  Gastritis.          -  Gastroesophageal flap valve classified as Hill Grade III (minimal   fold,            loose to endoscope, hiatal hernia likely).          -  Z-line regular, 40 cm from the incisors.  Biopsied.          -  Normal cricopharyngeus.          - mild mucous and particulate food matter in upper esophagus and             cricopharyngeal region. no obvious lesions      Pulmonary Function Testing  July 2023  FVC 2.41 (109%)  FEV1 2.09 (122%)  FEV1/FVC 0.87  No bronchodilator response.  RV 2.26 (102%)  TLC 4.93 (103%)  DLCOc 104%  Normal flow-volume loop    Time spent on chart and image review, meeting with the  patient to obtain history, perform physical exam, discuss test results, diagnostic possibilities, further testing options, treatment plan options, and care coordination: 32 minutes

## 2023-10-06 ENCOUNTER — HOSPITAL ENCOUNTER (OUTPATIENT)
Dept: CT IMAGING | Facility: HOSPITAL | Age: 83
Discharge: HOME | End: 2023-10-06
Attending: INTERNAL MEDICINE | Admitting: INTERNAL MEDICINE
Payer: COMMERCIAL

## 2023-10-06 DIAGNOSIS — R05.3 CHRONIC COUGH: ICD-10-CM

## 2023-10-06 PROCEDURE — 70486 CT MAXILLOFACIAL W/O DYE: CPT

## 2023-10-10 ENCOUNTER — TELEPHONE (OUTPATIENT)
Dept: FAMILY MEDICINE | Facility: CLINIC | Age: 83
End: 2023-10-10
Payer: COMMERCIAL

## 2023-10-10 NOTE — TELEPHONE ENCOUNTER
General Call      Reason for Call:  Call back    What are your questions or concerns:  Pt called and would like for PCP to call her and go over her RX as they've been changed due to testings. Please call pt when available, thanks!    Date of last appointment with provider: 6/19/2023    Could we send this information to you in [place] or would you prefer to receive a phone call?:   Patient would prefer a phone call   Okay to leave a detailed message?: Yes at Home number on file [phone] (home)

## 2023-10-11 ENCOUNTER — ALLIED HEALTH/NURSE VISIT (OUTPATIENT)
Dept: FAMILY MEDICINE | Facility: CLINIC | Age: 83
End: 2023-10-11
Payer: COMMERCIAL

## 2023-10-11 DIAGNOSIS — Z23 NEEDS FLU SHOT: Primary | ICD-10-CM

## 2023-10-11 PROCEDURE — 99207 PR NO CHARGE NURSE ONLY: CPT

## 2023-10-11 PROCEDURE — 90662 IIV NO PRSV INCREASED AG IM: CPT

## 2023-10-11 PROCEDURE — G0008 ADMIN INFLUENZA VIRUS VAC: HCPCS

## 2023-10-11 NOTE — TELEPHONE ENCOUNTER
Contacted patient and she will schedule an appointment with PCP, Dr Cordero to discuss medication changes and would like a CPE.   Direct transferred call to scheduling.    Judi Reeves RN  [facility]

## 2023-10-27 ENCOUNTER — OFFICE VISIT (OUTPATIENT)
Dept: FAMILY MEDICINE | Facility: CLINIC | Age: 83
End: 2023-10-27
Payer: COMMERCIAL

## 2023-10-27 VITALS
WEIGHT: 149 LBS | TEMPERATURE: 97 F | SYSTOLIC BLOOD PRESSURE: 158 MMHG | RESPIRATION RATE: 16 BRPM | HEIGHT: 63 IN | DIASTOLIC BLOOD PRESSURE: 90 MMHG | HEART RATE: 91 BPM | BODY MASS INDEX: 26.4 KG/M2 | OXYGEN SATURATION: 96 %

## 2023-10-27 DIAGNOSIS — G25.81 RESTLESS LEGS SYNDROME (RLS): ICD-10-CM

## 2023-10-27 DIAGNOSIS — R05.3 CHRONIC COUGH: ICD-10-CM

## 2023-10-27 DIAGNOSIS — F06.30 DEPRESSIVE DISORDER DUE TO SEPARATE MEDICAL CONDITION: ICD-10-CM

## 2023-10-27 DIAGNOSIS — G47.00 PERSISTENT INSOMNIA: ICD-10-CM

## 2023-10-27 DIAGNOSIS — Z23 NEED FOR COVID-19 VACCINE: ICD-10-CM

## 2023-10-27 DIAGNOSIS — Z00.00 ENCOUNTER FOR MEDICARE ANNUAL WELLNESS EXAM: Primary | ICD-10-CM

## 2023-10-27 DIAGNOSIS — I10 ESSENTIAL HYPERTENSION: ICD-10-CM

## 2023-10-27 LAB
ALBUMIN SERPL BCG-MCNC: 4.4 G/DL (ref 3.5–5.2)
ALP SERPL-CCNC: 62 U/L (ref 35–104)
ALT SERPL W P-5'-P-CCNC: 20 U/L (ref 0–50)
ANION GAP SERPL CALCULATED.3IONS-SCNC: 16 MMOL/L (ref 7–15)
AST SERPL W P-5'-P-CCNC: 31 U/L (ref 0–45)
BILIRUB SERPL-MCNC: 0.2 MG/DL
BUN SERPL-MCNC: 14.3 MG/DL (ref 8–23)
CALCIUM SERPL-MCNC: 9.7 MG/DL (ref 8.8–10.2)
CHLORIDE SERPL-SCNC: 97 MMOL/L (ref 98–107)
CREAT SERPL-MCNC: 0.9 MG/DL (ref 0.51–0.95)
DEPRECATED HCO3 PLAS-SCNC: 24 MMOL/L (ref 22–29)
EGFRCR SERPLBLD CKD-EPI 2021: 64 ML/MIN/1.73M2
ERYTHROCYTE [DISTWIDTH] IN BLOOD BY AUTOMATED COUNT: 13.4 % (ref 10–15)
FERRITIN SERPL-MCNC: 162 NG/ML (ref 11–328)
GLUCOSE SERPL-MCNC: 112 MG/DL (ref 70–99)
HCT VFR BLD AUTO: 43.4 % (ref 35–47)
HGB BLD-MCNC: 14.1 G/DL (ref 11.7–15.7)
IRON BINDING CAPACITY (ROCHE): 347 UG/DL (ref 240–430)
IRON SATN MFR SERPL: 24 % (ref 15–46)
IRON SERPL-MCNC: 83 UG/DL (ref 37–145)
MCH RBC QN AUTO: 30.4 PG (ref 26.5–33)
MCHC RBC AUTO-ENTMCNC: 32.5 G/DL (ref 31.5–36.5)
MCV RBC AUTO: 94 FL (ref 78–100)
PLATELET # BLD AUTO: 286 10E3/UL (ref 150–450)
POTASSIUM SERPL-SCNC: 3.7 MMOL/L (ref 3.4–5.3)
PROT SERPL-MCNC: 8 G/DL (ref 6.4–8.3)
RBC # BLD AUTO: 4.64 10E6/UL (ref 3.8–5.2)
SODIUM SERPL-SCNC: 137 MMOL/L (ref 135–145)
WBC # BLD AUTO: 4.9 10E3/UL (ref 4–11)

## 2023-10-27 PROCEDURE — 90480 ADMN SARSCOV2 VAC 1/ONLY CMP: CPT | Performed by: FAMILY MEDICINE

## 2023-10-27 PROCEDURE — 91320 SARSCV2 VAC 30MCG TRS-SUC IM: CPT | Performed by: FAMILY MEDICINE

## 2023-10-27 PROCEDURE — 80053 COMPREHEN METABOLIC PANEL: CPT | Performed by: FAMILY MEDICINE

## 2023-10-27 PROCEDURE — 82728 ASSAY OF FERRITIN: CPT | Mod: GZ | Performed by: FAMILY MEDICINE

## 2023-10-27 PROCEDURE — G0439 PPPS, SUBSEQ VISIT: HCPCS | Performed by: FAMILY MEDICINE

## 2023-10-27 PROCEDURE — 83550 IRON BINDING TEST: CPT | Mod: GZ | Performed by: FAMILY MEDICINE

## 2023-10-27 PROCEDURE — 85027 COMPLETE CBC AUTOMATED: CPT | Performed by: FAMILY MEDICINE

## 2023-10-27 PROCEDURE — 83540 ASSAY OF IRON: CPT | Mod: GZ | Performed by: FAMILY MEDICINE

## 2023-10-27 PROCEDURE — 36415 COLL VENOUS BLD VENIPUNCTURE: CPT | Performed by: FAMILY MEDICINE

## 2023-10-27 PROCEDURE — 99213 OFFICE O/P EST LOW 20 MIN: CPT | Mod: 25 | Performed by: FAMILY MEDICINE

## 2023-10-27 RX ORDER — LORATADINE 10 MG/1
10 TABLET ORAL DAILY
Qty: 90 TABLET | Refills: 3 | Status: SHIPPED | OUTPATIENT
Start: 2023-10-27 | End: 2024-01-15

## 2023-10-27 RX ORDER — GUAIFENESIN 600 MG/1
1200 TABLET, EXTENDED RELEASE ORAL 2 TIMES DAILY PRN
Qty: 360 TABLET | Refills: 3 | Status: SHIPPED | OUTPATIENT
Start: 2023-10-27 | End: 2024-05-06

## 2023-10-27 RX ORDER — ROPINIROLE 0.5 MG/1
0.5 TABLET, FILM COATED ORAL AT BEDTIME
Qty: 90 TABLET | Refills: 3 | Status: SHIPPED | OUTPATIENT
Start: 2023-10-27 | End: 2024-01-15

## 2023-10-27 ASSESSMENT — ENCOUNTER SYMPTOMS
PALPITATIONS: 0
ABDOMINAL PAIN: 0
SHORTNESS OF BREATH: 0
CONSTIPATION: 0
HEARTBURN: 0
PARESTHESIAS: 0
BREAST MASS: 0
DYSURIA: 0
HEADACHES: 0
NERVOUS/ANXIOUS: 0
HEMATURIA: 0
SORE THROAT: 0
DIZZINESS: 0
JOINT SWELLING: 0
COUGH: 1
ARTHRALGIAS: 0
FREQUENCY: 0
MYALGIAS: 0
HEMATOCHEZIA: 0
DIARRHEA: 0
EYE PAIN: 0
FEVER: 0
CHILLS: 0
NAUSEA: 0

## 2023-10-27 ASSESSMENT — ACTIVITIES OF DAILY LIVING (ADL): CURRENT_FUNCTION: NO ASSISTANCE NEEDED

## 2023-10-27 NOTE — PROGRESS NOTES
"SUBJECTIVE:   Deborah is a 83 year old who presents for Preventive Visit.      10/27/2023     7:52 AM   Additional Questions   Roomed by hser   Accompanied by        Are you in the first 12 months of your Medicare coverage?  No    Coughing - only coughs when phlegm gets built up -   Until she moves the phlegm out, she coughs  Every once in a while, misses a night of sleep due to this   Getting depressed -   Will have another breathing test -     Legs   Restless legs - keeps her up at night -   Got some medication - Melatonin       Healthy Habits:     In general, how would you rate your overall health?  Good    Frequency of exercise:  1 day/week    Duration of exercise:  Less than 15 minutes    Do you usually eat at least 4 servings of fruit and vegetables a day, include whole grains    & fiber and avoid regularly eating high fat or \"junk\" foods?  No    Taking medications regularly:  Yes    Medication side effects:  Not applicable    Ability to successfully perform activities of daily living:  No assistance needed    Home Safety:  No safety concerns identified    Hearing Impairment:  Difficulty following a conversation in a noisy restaurant or crowded room, feel that people are mumbling or not speaking clearly, need to ask people to speak up or repeat themselves and difficulty understanding soft or whispered speech    In the past 6 months, have you been bothered by leaking of urine?  No    In general, how would you rate your overall mental or emotional health?  Good    Additional concerns today:  No      Today's PHQ-2 Score:       10/27/2023     7:33 AM   PHQ-2 ( 1999 [employer])   Q1: Little interest or pleasure in doing things 0   Q2: Feeling down, depressed or hopeless 0   PHQ-2 Score 0   Q1: Little interest or pleasure in doing things Not at all   Q2: Feeling down, depressed or hopeless Not at all   PHQ-2 Score 0           Have you ever done Advance Care Planning? (For example, a Health Directive, POLST, or a " discussion with a medical provider or your loved ones about your wishes): Yes, advance care planning is on file.       Fall risk  Fallen 2 or more times in the past year?: No  Any fall with injury in the past year?: No    Cognitive Screening   1) Repeat 3 items (Leader, Season, Table)    2) Clock draw: NORMAL  3) 3 item recall: Recalls 1 object   Results: NORMAL clock, 1-2 items recalled: COGNITIVE IMPAIRMENT LESS LIKELY    Mini-CogTM Copyright DARIN Chaparro. Licensed by the author for use in [facility]; reprinted with permission (rachael@[facility]). All rights reserved.          Reviewed and updated as needed this visit by clinical staff   Tobacco  Allergies  Meds              Reviewed and updated as needed this visit by Provider                 Social History     Tobacco Use    Smoking status: Never    Smokeless tobacco: Never   Substance Use Topics    Alcohol use: No             10/27/2023     7:32 AM   Alcohol Use   Prescreen: >3 drinks/day or >7 drinks/week? Not Applicable     Do you have a current opioid prescription? No  Do you use any other controlled substances or medications that are not prescribed by a provider? None      Current providers sharing in care for this patient include:   Patient Care Team:  Emmanuelle Woodall MD as PCP - General (Family Medicine)  Emmanuelle Woodall MD as Assigned PCP  Jose Miguel Rodriguez MD as MD (Otolaryngology)  Lucas Lopez MD as Assigned Pulmonology Provider  Sabas Georges DO as Assigned Surgical Provider    The following health maintenance items are reviewed in Epic and correct as of today:  Health Maintenance   Topic Date Due    RSV VACCINE 60+ (1 - 1-dose 60+ series) Never done    ZOSTER IMMUNIZATION (3 of 3) 11/03/2020    ANNUAL REVIEW OF HM ORDERS  04/03/2024    MEDICARE ANNUAL WELLNESS VISIT  10/27/2024    FALL RISK ASSESSMENT  10/27/2024    ADVANCE CARE PLANNING  10/27/2028    DTAP/TDAP/TD IMMUNIZATION (4 - Td or Tdap)  05/02/2032    DEXA  09/30/2035    PHQ-2 (once per calendar year)  Completed    INFLUENZA VACCINE  Completed    Pneumococcal Vaccine: 65+ Years  Completed    COVID-19 Vaccine  Completed    IPV IMMUNIZATION  Aged Out    HPV IMMUNIZATION  Aged Out    MENINGITIS IMMUNIZATION  Aged Out     BP Readings from Last 3 Encounters:   10/27/23 (!) 158/90   10/05/23 138/86   07/05/23 126/80    Wt Readings from Last 3 Encounters:   10/27/23 67.6 kg (149 lb)   10/05/23 68.5 kg (151 lb)   07/05/23 67.8 kg (149 lb 8 oz)                  Patient Active Problem List   Diagnosis    Chronic Constipation    Osteoarthritis Of The Knee    Osteoporosis    Seborrheic Dermatitis Of The Scalp    Peripheral Neuropathy    Asymptomatic Coronary Arteriosclerosis    Restless Legs Syndrome    Hypertension    Spinal Stenosis    Patellofemoral Syndrome    Onychomycosis Of The Toenails    Skin tag    Pseudotumor (inflammatory) of orbit, right    Right flank pain, chronic    Hammertoe of second toe of right foot    Bilateral hearing loss    Tinnitus, bilateral    Bunion, right    Primary osteoarthritis of right hip - s/p right hip replacement    Hallux limitus, right    Chronic cough     Past Surgical History:   Procedure Laterality Date    ABDOMEN SURGERY      APPENDECTOMY      BACK SURGERY      CHOLECYSTECTOMY      COSMETIC SURGERY      blepharoplasty    ESOPHAGOSCOPY, GASTROSCOPY, DUODENOSCOPY (EGD), COMBINED N/A 6/27/2023    Procedure: ESOPHAGOGASTRODUODENOSCOPY, WITH BIOPSY;  Surgeon: Sabas Georges DO;  Location: [facility] OR    EYE SURGERY      FRACTURE SURGERY      HC DILATION/CURETTAGE DIAG/THER NON OB      Description: Dilation And Curettage;  Recorded: 02/02/2010;  Comments: due to miscarriage    HC KNEE SCOPE, DIAGNOSTIC      Description: Arthroscopy Knee Right;  Recorded: 02/02/2010;  Comments: right knee - arthroscopy for debridement; then Synvisc    LUMBAR FUSION  08/01/2013       Social History     Tobacco Use    Smoking  status: Never    Smokeless tobacco: Never   Substance Use Topics    Alcohol use: No     No family history on file.      Current Outpatient Medications   Medication Sig Dispense Refill    calcium carbonate-vitamin D3 (CALCIUM 600 + D,3,) 600 mg(1,500mg) -200 unit per tablet [CALCIUM CARBONATE-VITAMIN D3 (CALCIUM 600 + D,3,) 600 MG(1,500MG) -200 UNIT PER TABLET] Take 1 tablet by mouth 2 (two) times a day. (Patient taking differently: Take 1 tablet by mouth daily)  0    fluticasone (FLONASE) 50 MCG/ACT nasal spray One spray in each nostril daily 48 g 3    gabapentin (NEURONTIN) 300 MG capsule TAKE 2 CAPSULES BY MOUTH THREE TIMES DAILY. GENERIC EQUIVALENT FOR NEURONTIN 540 capsule 3    guaiFENesin (MUCINEX) 600 MG 12 hr tablet Take 2 tablets (1,200 mg) by mouth 2 times daily as needed for cough 360 tablet 3    hydrochlorothiazide (HYDRODIURIL) 25 MG tablet TAKE 1 TABLET BY MOUTH DAILY 90 tablet 2    loratadine (CLARITIN) 10 MG tablet Take 1 tablet (10 mg) by mouth daily 90 tablet 3    MULTIVITAMIN (MULTIPLE VITAMIN ORAL) [MULTIVITAMIN (MULTIPLE VITAMIN ORAL)] Take 1 tablet by mouth daily.      nortriptyline (PAMELOR) 75 MG capsule TAKE 1 CAPSULE BY MOUTH AT BEDTIME 90 capsule 1    rOPINIRole (REQUIP) 0.5 MG tablet Take 1 tablet (0.5 mg) by mouth at bedtime 90 tablet 3     No Known Allergies      Mammogram Screening - Patient over age 75, has elected to discontinue screenings.  Pertinent mammograms are reviewed under the imaging tab.    Review of Systems   Constitutional:  Negative for chills and fever.   HENT:  Positive for hearing loss. Negative for congestion, ear pain and sore throat.    Eyes:  Negative for pain.   Respiratory:  Positive for cough. Negative for shortness of breath.    Cardiovascular:  Negative for chest pain, palpitations and peripheral edema.   Gastrointestinal:  Negative for abdominal pain, constipation, diarrhea, heartburn, hematochezia and nausea.   Breasts:  Negative for tenderness, breast mass  "and discharge.   Genitourinary:  Negative for dysuria, frequency, genital sores, hematuria, pelvic pain, urgency, vaginal bleeding and vaginal discharge.   Musculoskeletal:  Negative for arthralgias, joint swelling and myalgias.   Skin:  Negative for rash.   Neurological:  Negative for dizziness, headaches and paresthesias.   Psychiatric/Behavioral:  Negative for mood changes. The patient is not nervous/anxious.    All other systems reviewed and are negative.        OBJECTIVE:   BP (!) 158/90 (BP Location: Right arm, Patient Position: Sitting, Cuff Size: Adult Regular)   Pulse 91   Temp 97  F (36.1  C) (Temporal)   Resp 16   Ht 1.6 m (5' 3\")   Wt 67.6 kg (149 lb)   LMP  (LMP Unknown)   SpO2 96%   BMI 26.39 kg/m   Estimated body mass index is 26.39 kg/m  as calculated from the following:    Height as of this encounter: 1.6 m (5' 3\").    Weight as of this encounter: 67.6 kg (149 lb).  Physical Exam  Vitals reviewed.   Constitutional:       General: She is not in acute distress.     Appearance: Normal appearance.   HENT:      Head: Normocephalic.      Right Ear: Tympanic membrane, ear canal and external ear normal.      Left Ear: Tympanic membrane, ear canal and external ear normal.      Nose: Nose normal.      Mouth/Throat:      Mouth: Mucous membranes are moist.      Pharynx: No posterior oropharyngeal erythema.   Eyes:      Extraocular Movements: Extraocular movements intact.      Conjunctiva/sclera: Conjunctivae normal.      Pupils: Pupils are equal, round, and reactive to light.   Cardiovascular:      Rate and Rhythm: Normal rate and regular rhythm.      Pulses: Normal pulses.      Heart sounds: Normal heart sounds. No murmur heard.  Pulmonary:      Effort: Pulmonary effort is normal.      Breath sounds: Normal breath sounds.   Abdominal:      Palpations: Abdomen is soft. There is no mass.      Tenderness: There is no abdominal tenderness. There is no guarding or rebound.   Musculoskeletal:         " "General: No deformity. Normal range of motion.      Cervical back: Normal range of motion and neck supple.   Lymphadenopathy:      Cervical: No cervical adenopathy.   Skin:     General: Skin is warm and dry.   Neurological:      General: No focal deficit present.      Mental Status: She is alert.   Psychiatric:         Mood and Affect: Mood normal.         Behavior: Behavior normal.         ASSESSMENT / PLAN:   1. Encounter for Medicare annual wellness exam  This is an 82 yo female here for AWV    2. Restless Legs Syndrome  Patient complains of restless legs syndrome - tried Melatonin and didn't help.  Tells me it only occurs a few nights a week, but adds to her overall insomnia.  Check ferritin, iron.    - Ferritin; Future  - Iron and iron binding capacity; Future  - CBC with platelets; Future  - rOPINIRole (REQUIP) 0.5 MG tablet; Take 1 tablet (0.5 mg) by mouth at bedtime  Dispense: 90 tablet; Refill: 3    3. Essential hypertension  Blood pressure is elevated today - encourage BP management.      4. Chronic cough  Patient complains of chronic cough - this is wearing her down.  She has several further tests scheduled - \"I don't understand why I'm doing more tests\".  We had a long discussion about this - she continues to complain of cough - is certain it has to do with mucus/phlegm.  Dr. Lopez gave her a prescription for Loratadine and Guaifenesin, but she never started these.  Wonders if she should take them - we discussed that if mucus is her issue, it would be prudent to do something designed to affect this symptom.   - loratadine (CLARITIN) 10 MG tablet; Take 1 tablet (10 mg) by mouth daily  Dispense: 90 tablet; Refill: 3  - guaiFENesin (MUCINEX) 600 MG 12 hr tablet; Take 2 tablets (1,200 mg) by mouth 2 times daily as needed for cough  Dispense: 360 tablet; Refill: 3    5. Need for COVID-19 vaccine  This patient desires current COVID-19 vaccine - ordered   - COVID-19 12+ (2023-24) (PFIZER)    6. 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  Discussed treatment option - she is not interested in taking medication for depression.      7. Persistent insomnia  Patient with persistent insomnia - continues to work on this.  But, restless legs syndrome does contribute.      Patient has been advised of split billing requirements and indicates understanding: Yes      COUNSELING:  Reviewed preventive health counseling, as reflected in patient instructions       Regular exercise       Healthy diet/nutrition      BMI:   Estimated body mass index is 26.39 kg/m  as calculated from the following:    Height as of this encounter: 1.6 m (5' 3\").    Weight as of this encounter: 67.6 kg (149 lb).         She reports that she has never smoked. She has never used smokeless tobacco.      Appropriate preventive services were discussed with this patient, including applicable screening as appropriate for fall prevention, nutrition, physical activity, Tobacco-use cessation, weight loss and cognition.  Checklist reviewing preventive services available has been given to the patient.    Reviewed patients plan of care and provided an AVS. The Basic Care Plan (routine screening as documented in Health Maintenance) for Deborah meets the Care Plan requirement. This Care Plan has been established and reviewed with the Patient.        DANICA LARA MD  [facility]  Prior to immunization administration, verified patients identity using patient s name and date of birth. Please see Immunization Activity for additional information.     Screening Questionnaire for Adult Immunization    Are you sick today?   No   Do you have allergies to medications, food, a vaccine component or latex?   No   Have you ever had a serious reaction after receiving a vaccination?   No   Do you have a long-term health problem " with heart, lung, kidney, or metabolic disease (e.g., diabetes), asthma, a blood disorder, no spleen, complement component deficiency, a cochlear implant, or a spinal fluid leak?  Are you on long-term aspirin therapy?   No   Do you have cancer, leukemia, HIV/AIDS, or any other immune system problem?   No   Do you have a parent, brother, or sister with an immune system problem?   No   In the past 3 months, have you taken medications that affect  your immune system, such as prednisone, other steroids, or anticancer drugs; drugs for the treatment of rheumatoid arthritis, Crohn s disease, or psoriasis; or have you had radiation treatments?   No   Have you had a seizure, or a brain or other nervous system problem?   No   During the past year, have you received a transfusion of blood or blood    products, or been given immune (gamma) globulin or antiviral drug?   No   For women: Are you pregnant or is there a chance you could become       pregnant during the next month?   No   Have you received any vaccinations in the past 4 weeks?   Yes     Immunization questionnaire was positive for at least one answer.  Notified provider.      Patient instructed to remain in clinic for 15 minutes afterwards, and to report any adverse reactions.     Screening performed by Doris Malik MA on 10/27/2023 at 7:56 AM.       Identified Health Risks:  I have reviewed Opioid Use Disorder and Substance Use Disorder risk factors and made any needed referrals.   She is at risk for lack of exercise and has been provided with information to increase physical activity for the benefit of her well-being.  The patient was counseled and encouraged to consider modifying their diet and eating habits. She was provided with information on recommended healthy diet options.  The patient was provided with written information regarding signs of hearing loss.

## 2023-10-27 NOTE — PATIENT INSTRUCTIONS
"Patient Education   Personalized Prevention Plan  You are due for the preventive services outlined below.  Your care team is available to assist you in scheduling these services.  If you have already completed any of these items, please share that information with your care team to update in your medical record.  Health Maintenance Due   Topic Date Due     RSV VACCINE 60+ (1 - 1-dose 60+ series) Never done     Zoster (Shingles) Vaccine (3 of 3) 11/03/2020     Annual Wellness Visit  05/02/2023     COVID-19 Vaccine (4 - 2023-24 season) 09/01/2023     Learning About Being Physically Active  What is physical activity?     Being physically active means doing any kind of activity that gets your body moving.  The types of physical activity that can help you get fit and stay healthy include:  Aerobic or \"cardio\" activities. These make your heart beat faster and make you breathe harder, such as brisk walking, riding a bike, or running. They strengthen your heart and lungs and build up your endurance.  Strength training activities. These make your muscles work against, or \"resist,\" something. Examples include lifting weights or doing push-ups. These activities help tone and strengthen your muscles and bones.  Stretches. These let you move your joints and muscles through their full range of motion. Stretching helps you be more flexible.  Reaching a balance between these three types of physical activity is important because each one contributes to your overall fitness.  What are the benefits of being active?  Being active is one of the best things you can do for your health. It helps you to:  Feel stronger and have more energy to do all the things you like to do.  Focus better at school or work.  Feel, think, and sleep better.  Reach and stay at a healthy weight.  Lose fat and build lean muscle.  Lower your risk for serious health problems, including diabetes, heart attack, high blood pressure, and some cancers.  Keep your " "heart, lungs, bones, muscles, and joints strong and healthy.  How can you make being active part of your life?  Start slowly. Make it your long-term goal to get at least 30 minutes of exercise on most days of the week. Walking is a good choice. You also may want to do other activities, such as running, swimming, cycling, or playing tennis or team sports.  Pick activities that you like--ones that make your heart beat faster, your muscles stronger, and your muscles and joints more flexible. If you find more than one thing you like doing, do them all. You don't have to do the same thing every day.  Get your heart pumping every day. Any activity that makes your heart beat faster and keeps it at that rate for a while counts.  Here are some great ways to get your heart beating faster:  Go for a brisk walk, run, or bike ride.  Go for a hike or swim.  Go in-line skating.  Play a game of touch football, basketball, or soccer.  Ride a bike.  Play tennis or racquetball.  Climb stairs.  Even some household chores can be aerobic--just do them at a faster pace. Vacuuming, raking or mowing the lawn, sweeping the garage, and washing and waxing the car all can help get your heart rate up.  Strengthen your muscles during the week. You don't have to lift heavy weights or grow big, bulky muscles to get stronger. Doing a few simple activities that make your muscles work against, or \"resist,\" something can help you get stronger.  For example, you can:  Do push-ups or sit-ups, which use your own body weight as resistance.  Lift weights or dumbbells or use stretch bands at home or in a gym or community center.  Stretch your muscles often. Stretching will help you as you become more active. It can help you stay flexible, loosen tight muscles, and avoid injury. It can also help improve your balance and posture and can be a great way to relax.  Be sure to stretch the muscles you'll be using when you work out. It's best to warm your muscles " "slightly before you stretch them. Walk or do some other light aerobic activity for a few minutes, and then start stretching.  When you stretch your muscles:  Do it slowly. Stretching is not about going fast or making sudden movements.  Don't push or bounce during a stretch.  Hold each stretch for at least 15 to 30 seconds, if you can. You should feel a stretch in the muscle, but not pain.  Breathe out as you do the stretch. Then breathe in as you hold the stretch. Don't hold your breath.  If you're worried about how more activity might affect your health, have a checkup before you start. Follow any special advice your doctor gives you for getting a smart start.  Where can you learn more?  Go to https://www.[employer]net/patiented  Enter W332 in the search box to learn more about \"Learning About Being Physically Active.\"  Current as of: October 10, 2022               Content Version: 13.7    1838-9857 [employer].   Care instructions adapted under license by your healthcare professional. If you have questions about a medical condition or this instruction, always ask your healthcare professional. [employer] disclaims any warranty or liability for your use of this information.      Learning About Dietary Guidelines  What are the Dietary Guidelines for Americans?     Dietary Guidelines for Americans provide tips for eating well and staying healthy. This helps reduce the risk for long-term (chronic) diseases.  These guidelines recommend that you:  Eat and drink the right amount for you. The U.[place] government's food guide is called MyPlate. It can help you make your own well-balanced eating plan.  Try to balance your eating with your activity. This helps you stay at a healthy weight.  Drink alcohol in moderation, if at all.  Limit foods high in salt, saturated fat, trans fat, and added sugar.  These guidelines are from the U.S. Department of Agriculture and the U.S. Department of Health and Human " "Services. They are updated every 5 years.  What is MyPlate?  MyPlate is the U.[place] government's food guide. It can help you make your own well-balanced eating plan. A balanced eating plan means that you eat enough, but not too much, and that your food gives you the nutrients you need to stay healthy.  MyPlate focuses on eating plenty of whole grains, fruits, and vegetables, and on limiting fat and sugar. It is available online at www.ChooseMyPlate.gov.  How can you get started?  If you're trying to eat healthier, you can slowly change your eating habits over time. You don't have to make big changes all at once. Start by adding one or two healthy foods to your meals each day.  Grains  Choose whole-grain breads and cereals and whole-wheat pasta and whole-grain crackers.  Vegetables  Eat a variety of vegetables every day. They have lots of nutrients and are part of a heart-healthy diet.  Fruits  Eat a variety of fruits every day. Fruits contain lots of nutrients. Choose fresh fruit instead of fruit juice.  Protein foods  Choose fish and lean poultry more often. Eat red meat and fried meats less often. Dried beans, tofu, and nuts are also good sources of protein.  Dairy  Choose low-fat or fat-free products from this food group. If you have problems digesting milk, try eating cheese or yogurt instead.  Fats and oils  Limit fats and oils if you're trying to cut calories. Choose healthy fats when you cook. These include canola oil and olive oil.  Where can you learn more?  Go to https://www.[employer].net/patiented  Enter D676 in the search box to learn more about \"Learning About Dietary Guidelines.\"  Current as of: March 1, 2023               Content Version: 13.7    8529-2605 [employer].   Care instructions adapted under license by your healthcare professional. If you have questions about a medical condition or this instruction, always ask your healthcare professional. [employer] disclaims any " warranty or liability for your use of this information.      Hearing Loss: Care Instructions  Overview     Hearing loss is a sudden or slow decrease in how well you hear. It can range from slight to profound. Permanent hearing loss can occur with aging. It also can happen when you are exposed long-term to loud noise. Examples include listening to loud music, riding motorcycles, or being around other loud machines.  Hearing loss can affect your work and home life. It can make you feel lonely or depressed. You may feel that you have lost your independence. But hearing aids and other devices can help you hear better and feel connected to others.  Follow-up care is a key part of your treatment and safety. Be sure to make and go to all appointments, and call your doctor if you are having problems. It's also a good idea to know your test results and keep a list of the medicines you take.  How can you care for yourself at home?  Avoid loud noises whenever possible. This helps keep your hearing from getting worse.  Always wear hearing protection around loud noises.  Wear a hearing aid as directed.  A professional can help you pick a hearing aid that will work best for you.  You can also get hearing aids over the counter for mild to moderate hearing loss.  Have hearing tests as your doctor suggests. They can show whether your hearing has changed. Your hearing aid may need to be adjusted.  Use other devices as needed. These may include:  Telephone amplifiers and hearing aids that can connect to a television, stereo, radio, or microphone.  Devices that use lights or vibrations. These alert you to the doorbell, a ringing telephone, or a baby monitor.  Television closed-captioning. This shows the words at the bottom of the screen. Most new TVs can do this.  TTY (text telephone). This lets you type messages back and forth on the telephone instead of talking or listening. These devices are also called TDD. When messages are typed  "on the keyboard, they are sent over the phone line to a receiving TTY. The message is shown on a monitor.  Use text messaging, social media, and email if it is hard for you to communicate by telephone.  Try to learn a listening technique called speechreading. It is not lipreading. You pay attention to people's gestures, expressions, posture, and tone of voice. These clues can help you understand what a person is saying. Face the person you are talking to, and have them face you. Make sure the lighting is good. You need to see the other person's face clearly.  Think about counseling if you need help to adjust to your hearing loss.  When should you call for help?  Watch closely for changes in your health, and be sure to contact your doctor if:    You think your hearing is getting worse.     You have new symptoms, such as dizziness or nausea.   Where can you learn more?  Go to https://www.[employer]net/patiented  Enter R798 in the search box to learn more about \"Hearing Loss: Care Instructions.\"  Current as of: March 1, 2023               Content Version: 13.7    4785-5695 [employer].   Care instructions adapted under license by your healthcare professional. If you have questions about a medical condition or this instruction, always ask your healthcare professional. [employer] disclaims any warranty or liability for your use of this information.         "

## 2023-10-30 DIAGNOSIS — H93.13 TINNITUS, BILATERAL: Primary | ICD-10-CM

## 2023-11-14 RX ORDER — ALBUTEROL SULFATE 0.83 MG/ML
2.5 SOLUTION RESPIRATORY (INHALATION) ONCE
Status: COMPLETED | OUTPATIENT
Start: 2023-11-15 | End: 2023-11-15

## 2023-11-15 ENCOUNTER — HOSPITAL ENCOUNTER (OUTPATIENT)
Dept: RESPIRATORY THERAPY | Facility: CLINIC | Age: 83
Discharge: HOME | End: 2023-11-15
Attending: INTERNAL MEDICINE | Admitting: INTERNAL MEDICINE
Payer: COMMERCIAL

## 2023-11-15 VITALS — SYSTOLIC BLOOD PRESSURE: 143 MMHG | DIASTOLIC BLOOD PRESSURE: 70 MMHG | OXYGEN SATURATION: 99 %

## 2023-11-15 DIAGNOSIS — R05.3 CHRONIC COUGH: ICD-10-CM

## 2023-11-15 LAB
EXPTIME-%CHANGE-CHLG: -8 %
EXPTIME-CHLG: 5.34 SEC
EXPTIME-PRE: 5.83 SEC
FEF2575-%CHANGE-CHLG: -14 %
FEF2575-%PRED-CHLG: 134 %
FEF2575-%PRED-POST: 159 %
FEF2575-%PRED-PRE: 157 %
FEF2575-POST: 2.18 L/SEC
FEF2575-PRE: 2.16 L/SEC
FEF2575-PRED: 1.37 L/SEC
FEFMAX-%PRED-PRE: 143 %
FEFMAX-PRE: 6.26 L/SEC
FEFMAX-PRED: 4.36 L/SEC
FEV1-%PRED-PRE: 122 %
FEV1-PRE: 2.06 L
FEV1FEV6-PRE: 82 %
FEV1FEV6-PRED: 77 %
FEV1FVC-PRE: 83 %
FEV1FVC-PRED: 78 %
FIFMAX-%CHANGE-CHLG: 44 %
FIFMAX-CHLG: 4.41 L/SEC
FIFMAX-PRE: 3.05 L/SEC
FVC-%PRED-PRE: 113 %
FVC-PRE: 2.48 L
FVC-PRED: 2.19 L

## 2023-11-15 PROCEDURE — 95070 INHLJ BRNCL CHALLENGE TSTG: CPT

## 2023-11-15 PROCEDURE — 999N000157 HC STATISTIC RCP TIME EA 10 MIN

## 2023-11-15 PROCEDURE — 250N000009 HC RX 250: Performed by: INTERNAL MEDICINE

## 2023-11-15 PROCEDURE — 94070 EVALUATION OF WHEEZING: CPT | Mod: 26 | Performed by: INTERNAL MEDICINE

## 2023-11-15 PROCEDURE — 94070 EVALUATION OF WHEEZING: CPT

## 2023-11-15 RX ADMIN — ALBUTEROL SULFATE 2.5 MG: 2.5 SOLUTION RESPIRATORY (INHALATION) at 09:14

## 2023-11-15 NOTE — PROGRESS NOTES
RESPIRATORY CARE NOTE    Methacholine challenge testing completed by patient. Good patient effort and cooperation. Albuterol 2.5 mg neb given for bronchodilation post methacholine. PT tolerated well after bronchodilation. Test will be scanned into Epic. The results of this test meet the ATS standards for acceptability and repeatability.  PT left in no distress.    Methacholine dosages administered:    0.0625mg  0.25mg  1mg  4mg  16mg    Bina Moe, RT   11/15/2023

## 2023-11-16 ENCOUNTER — TELEPHONE (OUTPATIENT)
Dept: PULMONOLOGY | Facility: CLINIC | Age: 83
End: 2023-11-16
Payer: COMMERCIAL

## 2023-11-16 DIAGNOSIS — K21.00 GASTROESOPHAGEAL REFLUX DISEASE WITH ESOPHAGITIS WITHOUT HEMORRHAGE: Primary | ICD-10-CM

## 2023-11-16 DIAGNOSIS — R05.3 CHRONIC COUGH: ICD-10-CM

## 2023-11-16 DIAGNOSIS — K21.00 GASTROESOPHAGEAL REFLUX DISEASE WITH ESOPHAGITIS WITHOUT HEMORRHAGE: ICD-10-CM

## 2023-11-16 RX ORDER — OMEPRAZOLE 40 MG/1
40 CAPSULE, DELAYED RELEASE ORAL DAILY
Qty: 30 CAPSULE | Refills: 11 | Status: SHIPPED | OUTPATIENT
Start: 2023-11-16 | End: 2023-11-16

## 2023-11-16 RX ORDER — OMEPRAZOLE 40 MG/1
40 CAPSULE, DELAYED RELEASE ORAL DAILY
Qty: 90 CAPSULE | Refills: 3 | Status: SHIPPED | OUTPATIENT
Start: 2023-11-16 | End: 2024-05-24

## 2023-11-16 NOTE — TELEPHONE ENCOUNTER
Pulmonary Telephone Note    Methacholine challenge test was negative. The patient is very frustrated, ongoing cough and sputum. She stopped omeprazole. 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 She is upset and does not want more tests. Her esophagram in May was very abnormal with reflux to the thoracic inlet and slight narrowing of the proximal esophagus. 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 Gastritis was noted. With the marked reflux on esophagram, laryngopharyngeal reflux is likely. I recommended restarting omeprazole and referring to GI at the [facility] or [place] to consider esophageal impedance probe, could consider Nissen if refractory symptomatic reflux. Also could consider just suppressing the cough with codeine, a more palliative approach, though this would carry some risks. She is very frustrated and does not want this referral now. She is willing to restart omeprazole 40 mg daily. She will talk with family over Thanksgiving and decide whether or not to proceed with GI referral. She will then let me know.    Lucas Lopez MD  Pulmonary and Critical Care Medicine  [facility]  Office [phone]  Pager [phone]  he/him

## 2023-11-16 NOTE — LETTER
2023    Dear Dr. Woodall,    See below for documentation regarding Deborah Mireles ( 1940). Please feel free to call me at any time if needed.    Sincerely,    Lucas Lopez MD  Pulmonary and Critical Care Medicine  [facility]  Cell [phone]  Office [phone]  Pager [phone]  he/him    Pulmonary Telephone Note    Methacholine challenge test was negative. The patient is very frustrated, ongoing cough and sputum. She stopped omeprazole. 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 She is upset and does not want more tests. Her esophagram in May was very abnormal with reflux to the thoracic inlet and slight narrowing of the proximal esophagus. EGD in  showed Hill grade III GE flap valve, hiatal hernia likely. Gastritis was noted. With the marked reflux on esophagram, laryngopharyngeal reflux is likely. I recommended restarting omeprazole and referring to GI at the [facility] or [place] to consider esophageal impedance probe, could consider Nissen if refractory symptomatic reflux. Also could consider just suppressing the cough with codeine, a more palliative approach, though this would carry some risks. She is very frustrated and does not want this referral now. She is willing to restart omeprazole 40 mg daily. She will talk with family over Thanksgiving and decide whether or not to proceed with GI referral. She will then let me know.

## 2023-11-28 ENCOUNTER — DOCUMENTATION ONLY (OUTPATIENT)
Dept: PULMONOLOGY | Facility: CLINIC | Age: 83
End: 2023-11-28
Payer: COMMERCIAL

## 2023-11-28 NOTE — PROGRESS NOTES
Pulmonary Documentation    I received the following MyChart message from Deborah:    Dr. Lopez     We have had time to think about your recommendations.  We talked to our kids and discussed the situation.  After having fewer symptoms in [place], we have decided to  have an air quality test done in our home.  With that in mind, we will put the [place] and [facility]. on hold.  We will get back in touch after we get the results from the  air quality test.  Thanks again for your concern and help.  Deborah Mireles  November 28, 2023    See my telephone encounter from 11/16/23 for additional details. I replied to Deborah, and encourage her to reach out to me with any updates, questions, or concerns.    Lucas Lopez MD  Pulmonary and Critical Care Medicine  [facility]  Office [phone]

## 2024-01-15 ENCOUNTER — OFFICE VISIT (OUTPATIENT)
Dept: PULMONOLOGY | Facility: CLINIC | Age: 84
End: 2024-01-15
Attending: INTERNAL MEDICINE
Payer: COMMERCIAL

## 2024-01-15 VITALS
DIASTOLIC BLOOD PRESSURE: 74 MMHG | WEIGHT: 147 LBS | OXYGEN SATURATION: 99 % | HEART RATE: 96 BPM | SYSTOLIC BLOOD PRESSURE: 134 MMHG | BODY MASS INDEX: 26.04 KG/M2

## 2024-01-15 DIAGNOSIS — K21.9 LARYNGOPHARYNGEAL REFLUX: ICD-10-CM

## 2024-01-15 DIAGNOSIS — K21.00 GASTROESOPHAGEAL REFLUX DISEASE WITH ESOPHAGITIS WITHOUT HEMORRHAGE: ICD-10-CM

## 2024-01-15 DIAGNOSIS — R05.3 CHRONIC COUGH: Primary | ICD-10-CM

## 2024-01-15 PROCEDURE — 99214 OFFICE O/P EST MOD 30 MIN: CPT | Performed by: INTERNAL MEDICINE

## 2024-01-15 NOTE — PATIENT INSTRUCTIONS
It was good to see you in clinic today. This is what we discussed:    You have significant reflux on your prior esophagram, and evidence of a hiatal hernia and stomach inflammation on the upper endoscopy.  I would consider going back to see the gastroenterologist if your cough persists.  Pursue your air quality assessments.  If you have questions or concerns, let me know.    Lucas Lopez MD  Pulmonary and Critical Care Medicine  [facility]  Office [phone]

## 2024-01-15 NOTE — LETTER
"    1/15/2024         RE: Deborah Mireles  [address] [facility] [address]        Dear Colleague,    Thank you for referring your patient, Deborah Mireles, to the [facility]. Please see a copy of my visit note below.    Pulmonary Clinic Follow-up Visit    Assessment and Plan:   83 year old female never smoker with a history of primary HTN, peripheral neuropathy, osteoporosis, RLS, spinal stenosis, osteoarthritis, chronic cough, GERD, gastritis and hiatal hernia, presenting for follow-up.     Chronic cough: Persistent daily bothersome cough with intermittent phlegm production. Likely due to laryngopharyngeal reflux from hiatal hernia and GERD. Esophagram in May 2023 showed \"intermittent large volumes of fluid reflux to the thoracic inlet.\" EGD in June 2023 showed mucous and particulate food matter in the upper esophagus and cricopharyngeal region, gastroesophageal flap valve Hill grade III (hiatal hernia likely), gastritis. Normal PFT, negative methacholine challenge, normal lung parenchyma on CT coronary calcium scoring. No improvement with empiric loratadine and nasal fluticasone for possible rhinosinusitis; sinus CT unremarkable. She is on omeprazole 40 mg daily and I previously advised follow-up with GI and considering esophageal impedance-pH probe to further evaluate reflux. She would first like to pursue home air quality testing, as she felt that the cough was less prominent when she was in [place] and [place]. Already on gabapentin, which is sometimes used for refractory idiopathic chronic cough.     Plan:  - recommended referral to GI for esophageal impedance-pH probe given above findings and persistent cough; she would like to defer until she completes home air quality assessment  - continue omeprazole 40 mg daily  - already on gabapentin 600 mg TID (sometimes used for refractory idiopathic chronic cough)  - recommend remaining up to date with respiratory " "vaccinations  - follow up as needed  - encouraged her to contact us with questions or concerning symptoms    Lucas Lopez MD  Pulmonary and Critical Care Medicine  [facility]  Office [phone]  Pager [phone]  he/him    CCx: chronic cough    HPI: 83 year old female never smoker with a history of primary HTN, peripheral neuropathy, osteoporosis, RLS, spinal stenosis, osteoarthritis, chronic cough, GERD, gastritis and hiatal hernia, presenting for follow-up. Persistent daily bothersome cough with intermittent phlegm production. Likely due to laryngopharyngeal reflux from hiatal hernia and GERD. Esophagram in May 2023 showed \"intermittent large volumes of fluid reflux to the thoracic inlet.\" EGD in June 2023 showed mucous and particulate food matter in the upper esophagus and cricopharyngeal region, gastroesophageal flap valve Hill grade III (hiatal hernia likely), gastritis. Normal PFT, negative methacholine challenge, normal lung parenchyma on CT coronary calcium scoring. No improvement with empiric loratadine and nasal fluticasone for possible rhinosinusitis; sinus CT unremarkable. She is on omeprazole 40 mg daily and I previously advised follow-up with GI and considering esophageal impedance-pH probe to further evaluate reflux. She would first like to pursue home air quality testing, as she felt that the cough was less prominent when she was in [place] and [place]. Already on gabapentin, which is sometimes used for refractory idiopathic chronic cough.    ROS:  A 12-system review was obtained and was negative with the exception of the symptoms endorsed in the history of present illness.    PMH:  primary HTN, peripheral neuropathy, osteoporosis, RLS, spinal stenosis, osteoarthritis, chronic cough, GERD, gastritis and hiatal hernia    PSH:  Past Surgical History:   Procedure Laterality Date     ABDOMEN SURGERY       APPENDECTOMY       BACK SURGERY       CHOLECYSTECTOMY       COSMETIC SURGERY   "    blepharoplasty     ESOPHAGOSCOPY, GASTROSCOPY, DUODENOSCOPY (EGD), COMBINED N/A 6/27/2023    Procedure: ESOPHAGOGASTRODUODENOSCOPY, WITH BIOPSY;  Surgeon: Sabas Georges DO;  Location: [facility] OR     EYE SURGERY       FRACTURE SURGERY       HC DILATION/CURETTAGE DIAG/THER NON OB      Description: Dilation And Curettage;  Recorded: 02/02/2010;  Comments: due to miscarriage     HC KNEE SCOPE, DIAGNOSTIC      Description: Arthroscopy Knee Right;  Recorded: 02/02/2010;  Comments: right knee - arthroscopy for debridement; then Synvisc     LUMBAR FUSION  08/01/2013       Allergies:  No Known Allergies    Family HX:  No family history on file.    Social Hx:  Social History     Socioeconomic History     Marital status:      Spouse name: Not on file     Number of children: Not on file     Years of education: Not on file     Highest education level: Not on file   Occupational History     Not on file   Tobacco Use     Smoking status: Never     Smokeless tobacco: Never   Vaping Use     Vaping Use: Never used   Substance and Sexual Activity     Alcohol use: No     Drug use: No     Sexual activity: Not on file   Other Topics Concern     Not on file   Social History Narrative     Not on file     Social Determinants of Health     Financial Resource Strain: Low Risk  (10/27/2023)    Financial Resource Strain      Within the past 12 months, have you or your family members you live with been unable to get utilities (heat, electricity) when it was really needed?: No   Food Insecurity: Low Risk  (10/27/2023)    Food Insecurity      Within the past 12 months, did you worry that your food would run out before you got money to buy more?: No      Within the past 12 months, did the food you bought just not last and you didn t have money to get more?: No   Transportation Needs: Low Risk  (10/27/2023)    Transportation Needs      Within the past 12 months, has lack of transportation kept you from medical appointments,  getting your medicines, non-medical meetings or appointments, work, or from getting things that you need?: No   Physical Activity: Not on file   Stress: Not on file   Social Connections: Not on file   Interpersonal Safety: Not on file   Housing Stability: Low Risk  (10/27/2023)    Housing Stability      Do you have housing? : Yes      Are you worried about losing your housing?: No       Current Meds:  Current Outpatient Medications   Medication Sig Dispense Refill     calcium carbonate-vitamin D3 (CALCIUM 600 + D,3,) 600 mg(1,500mg) -200 unit per tablet [CALCIUM CARBONATE-VITAMIN D3 (CALCIUM 600 + D,3,) 600 MG(1,500MG) -200 UNIT PER TABLET] Take 1 tablet by mouth 2 (two) times a day. (Patient taking differently: Take 1 tablet by mouth daily)  0     gabapentin (NEURONTIN) 300 MG capsule TAKE 2 CAPSULES BY MOUTH THREE TIMES DAILY. GENERIC EQUIVALENT FOR NEURONTIN 540 capsule 3     guaiFENesin (MUCINEX) 600 MG 12 hr tablet Take 2 tablets (1,200 mg) by mouth 2 times daily as needed for cough 360 tablet 3     hydrochlorothiazide (HYDRODIURIL) 25 MG tablet TAKE 1 TABLET BY MOUTH DAILY 90 tablet 2     MULTIVITAMIN (MULTIPLE VITAMIN ORAL) [MULTIVITAMIN (MULTIPLE VITAMIN ORAL)] Take 1 tablet by mouth daily.       nortriptyline (PAMELOR) 75 MG capsule TAKE 1 CAPSULE BY MOUTH AT BEDTIME 90 capsule 1     omeprazole (PRILOSEC) 40 MG DR capsule Take 1 capsule (40 mg) by mouth daily 90 capsule 3       Physical Exam:  /74 (BP Location: Left arm, Patient Position: Chair, Cuff Size: Adult Regular)   Pulse 96   Wt 66.7 kg (147 lb)   LMP  (LMP Unknown)   SpO2 99%   BMI 26.04 kg/m    Gen: alert, oriented, no distress  HEENT: no cervical or supraclavicular lymphadenopathy  CV: tachycardic, regular, no M/G/R  Resp: CTAB, no focal crackles or wheezes  Skin: no apparent rashes  Ext: no cyanosis, clubbing or edema  Neuro: alert, nonfocal    Labs:  reviewed    Imaging:  CXR (May 2023):  - images directly reviewed, formal  interpretation follows:     Heart and mediastinal size are normal. The right hemidiaphragm is elevated. This is unchanged. Lungs and pleural spaces are clear. No pleural effusion or pneumothorax.     CT coronary calcium (May 2023):  - images directly reviewed, formal interpretation follows:  FINDINGS:       LIMITED CHEST: Stable elevated right hemidiaphragm.     LIMITED MEDIASTINUM: Negative.     LIMITED UPPER ABDOMEN: Negative.                                                                   IMPRESSION:    1.  No significant incidental extracardiac findings.  2.  Please refer to cardiologist's dictation for the cardiac CT report.    CT sinuses (October 2023):  - images directly reviewed, formal interpretation follows:  FINDINGS:      FRONTAL SINUSES: Clear.     ETHMOID SINUSES: Clear.     SPHENOID SINUSES: Clear.      MAXILLARY SINUSES: Clear. The floors of the maxillary sinuses are intact.     NASAL CAVITY/SKULL BASE: Nasal septal deviation to the left with osseous spur deviated to the left abutting the left inferior nasal turbinate. Intact nasal septum. Elsie bullosa of the right middle turbinate. The cribriform plate is intact.      PARANASAL SINUS DRAINAGE PATHWAYS:  The paranasal sinus drainage pathways are patent. Bilateral Pippa cells.     NON-SINUS STRUCTURES: No abnormality of the visualized orbits or intracranial compartment. Visualized mastoid air cells bilaterally are essentially clear. Bilateral TMJ degenerative changes.                                                                   IMPRESSION:  1.  Paranasal sinuses are clear.  2.  No paranasal sinus air-fluid levels.     Esophagram (May 2023):  FINDINGS:   FLUOROSCOPIC TIME: 3.3  NUMBER OF IMAGES: 16      ESOPHAGUS:  No aspiration. There is slight narrowing of the proximal esophagus on lateral imaging, though a 12.5 mm tablet traversed this area without delay. This could represent a cricopharyngeus muscle impression. No other potential  strictures. Primary   and secondary peristalsis result in emptying of the esophagus, though mild proximal escape is noted. Intermittently, large volumes of fluid reflux to the thoracic inlet. No hiatal hernia appreciated. No mucosal abnormalities or masses.  Elevated right   hemidiaphragm.                                                                   IMPRESSION:   1.  Intermittent reflux of fluid to the thoracic inlet, which could contribute to coughing. No hiatal hernia appreciated.  2.  No definite strictures. There is narrowing of the proximal esophagus on the lateral view though this could be from a cricopharyngeus muscle impression. A 12.5 mm tablet traversed this region without delay. Consider direct visualization if there is   concern for narrowing in this region.  3.  No aspiration on this study.  4.  Elevated right hemidiaphragm.     EGD (June 2023):  Findings:         -  The second portion of the duodenum was normal.          -  Patchy mild inflammation characterized by erythema was found in the   gastric            antrum and in the gastric body.          -  The gastroesophageal flap valve was visualized endoscopically and             classified as Hill Grade III (minimal fold, loose to endoscope, hiatal    hernia             likely).          -  The Z-line was regular and was found 40 cm from the incisors.    Biopsies            were taken with a cold forceps for histology.          -  The cricopharyngeus was normal.  mild mucous and particulate food   matter in             upper esophagus and cricopharyngeal region. no obvious lesions   Impression:         -  Normal second portion of the duodenum.          -  Gastritis.          -  Gastroesophageal flap valve classified as Hill Grade III (minimal   fold,            loose to endoscope, hiatal hernia likely).          -  Z-line regular, 40 cm from the incisors.  Biopsied.          -  Normal cricopharyngeus.          - mild mucous and particulate food  matter in upper esophagus and             cricopharyngeal region. no obvious lesions      Pulmonary Function Testing  July 2023  FVC 2.41 (109%)  FEV1 2.09 (122%)  FEV1/FVC 0.87  No bronchodilator response.  RV 2.26 (102%)  TLC 4.93 (103%)  DLCOc 104%  Normal flow-volume loop    Time spent on chart and image review, meeting with the patient to obtain history, perform physical exam, discuss test results, diagnostic possibilities, further testing options, treatment plan options, and care coordination: 34 minutes      Again, thank you for allowing me to participate in the care of your patient.        Sincerely,        Lucas Lopez MD

## 2024-01-15 NOTE — PROGRESS NOTES
"Pulmonary Clinic Follow-up Visit    Assessment and Plan:   83 year old female never smoker with a history of primary HTN, peripheral neuropathy, osteoporosis, RLS, spinal stenosis, osteoarthritis, chronic cough, GERD, gastritis and hiatal hernia, presenting for follow-up.     Chronic cough: Persistent daily bothersome cough with intermittent phlegm production. Likely due to laryngopharyngeal reflux from hiatal hernia and GERD. Esophagram in May 2023 showed \"intermittent large volumes of fluid reflux to the thoracic inlet.\" EGD in June 2023 showed mucous and particulate food matter in the upper esophagus and cricopharyngeal region, gastroesophageal flap valve Hill grade III (hiatal hernia likely), gastritis. Normal PFT, negative methacholine challenge, normal lung parenchyma on CT coronary calcium scoring. No improvement with empiric loratadine and nasal fluticasone for possible rhinosinusitis; sinus CT unremarkable. She is on omeprazole 40 mg daily and I previously advised follow-up with GI and considering esophageal impedance-pH probe to further evaluate reflux. She would first like to pursue home air quality testing, as she felt that the cough was less prominent when she was in [place] and [place]. Already on gabapentin, which is sometimes used for refractory idiopathic chronic cough.     Plan:  - recommended referral to GI for esophageal impedance-pH probe given above findings and persistent cough; she would like to defer until she completes home air quality assessment  - continue omeprazole 40 mg daily  - already on gabapentin 600 mg TID (sometimes used for refractory idiopathic chronic cough)  - recommend remaining up to date with respiratory vaccinations  - follow up as needed  - encouraged her to contact us with questions or concerning symptoms    Lucas Lopez MD  Pulmonary and Critical Care Medicine  [facility]  Office [phone]  Pager [phone]  he/him    CCx: chronic cough    HPI: " "83 year old female never smoker with a history of primary HTN, peripheral neuropathy, osteoporosis, RLS, spinal stenosis, osteoarthritis, chronic cough, GERD, gastritis and hiatal hernia, presenting for follow-up. Persistent daily bothersome cough with intermittent phlegm production. Likely due to laryngopharyngeal reflux from hiatal hernia and GERD. Esophagram in May 2023 showed \"intermittent large volumes of fluid reflux to the thoracic inlet.\" EGD in June 2023 showed mucous and particulate food matter in the upper esophagus and cricopharyngeal region, gastroesophageal flap valve Hill grade III (hiatal hernia likely), gastritis. Normal PFT, negative methacholine challenge, normal lung parenchyma on CT coronary calcium scoring. No improvement with empiric loratadine and nasal fluticasone for possible rhinosinusitis; sinus CT unremarkable. She is on omeprazole 40 mg daily and I previously advised follow-up with GI and considering esophageal impedance-pH probe to further evaluate reflux. She would first like to pursue home air quality testing, as she felt that the cough was less prominent when she was in [place] and [place]. Already on gabapentin, which is sometimes used for refractory idiopathic chronic cough.    ROS:  A 12-system review was obtained and was negative with the exception of the symptoms endorsed in the history of present illness.    PMH:  primary HTN, peripheral neuropathy, osteoporosis, RLS, spinal stenosis, osteoarthritis, chronic cough, GERD, gastritis and hiatal hernia    PSH:  Past Surgical History:   Procedure Laterality Date    ABDOMEN SURGERY      APPENDECTOMY      BACK SURGERY      CHOLECYSTECTOMY      COSMETIC SURGERY      blepharoplasty    ESOPHAGOSCOPY, GASTROSCOPY, DUODENOSCOPY (EGD), COMBINED N/A 6/27/2023    Procedure: ESOPHAGOGASTRODUODENOSCOPY, WITH BIOPSY;  Surgeon: Sabas Georges DO;  Location: [facility] OR    EYE SURGERY      FRACTURE SURGERY      HC DILATION/CURETTAGE " DIAG/THER NON OB      Description: Dilation And Curettage;  Recorded: 02/02/2010;  Comments: due to miscarriage    HC KNEE SCOPE, DIAGNOSTIC      Description: Arthroscopy Knee Right;  Recorded: 02/02/2010;  Comments: right knee - arthroscopy for debridement; then Synvisc    LUMBAR FUSION  08/01/2013       Allergies:  No Known Allergies    Family HX:  No family history on file.    Social Hx:  Social History     Socioeconomic History    Marital status:      Spouse name: Not on file    Number of children: Not on file    Years of education: Not on file    Highest education level: Not on file   Occupational History    Not on file   Tobacco Use    Smoking status: Never    Smokeless tobacco: Never   Vaping Use    Vaping Use: Never used   Substance and Sexual Activity    Alcohol use: No    Drug use: No    Sexual activity: Not on file   Other Topics Concern    Not on file   Social History Narrative    Not on file     Social Determinants of Health     Financial Resource Strain: Low Risk  (10/27/2023)    Financial Resource Strain     Within the past 12 months, have you or your family members you live with been unable to get utilities (heat, electricity) when it was really needed?: No   Food Insecurity: Low Risk  (10/27/2023)    Food Insecurity     Within the past 12 months, did you worry that your food would run out before you got money to buy more?: No     Within the past 12 months, did the food you bought just not last and you didn t have money to get more?: No   Transportation Needs: Low Risk  (10/27/2023)    Transportation Needs     Within the past 12 months, has lack of transportation kept you from medical appointments, getting your medicines, non-medical meetings or appointments, work, or from getting things that you need?: No   Physical Activity: Not on file   Stress: Not on file   Social Connections: Not on file   Interpersonal Safety: Not on file   Housing Stability: Low Risk  (10/27/2023)    Housing Stability      Do you have housing? : Yes     Are you worried about losing your housing?: No       Current Meds:  Current Outpatient Medications   Medication Sig Dispense Refill    calcium carbonate-vitamin D3 (CALCIUM 600 + D,3,) 600 mg(1,500mg) -200 unit per tablet [CALCIUM CARBONATE-VITAMIN D3 (CALCIUM 600 + D,3,) 600 MG(1,500MG) -200 UNIT PER TABLET] Take 1 tablet by mouth 2 (two) times a day. (Patient taking differently: Take 1 tablet by mouth daily)  0    gabapentin (NEURONTIN) 300 MG capsule TAKE 2 CAPSULES BY MOUTH THREE TIMES DAILY. GENERIC EQUIVALENT FOR NEURONTIN 540 capsule 3    guaiFENesin (MUCINEX) 600 MG 12 hr tablet Take 2 tablets (1,200 mg) by mouth 2 times daily as needed for cough 360 tablet 3    hydrochlorothiazide (HYDRODIURIL) 25 MG tablet TAKE 1 TABLET BY MOUTH DAILY 90 tablet 2    MULTIVITAMIN (MULTIPLE VITAMIN ORAL) [MULTIVITAMIN (MULTIPLE VITAMIN ORAL)] Take 1 tablet by mouth daily.      nortriptyline (PAMELOR) 75 MG capsule TAKE 1 CAPSULE BY MOUTH AT BEDTIME 90 capsule 1    omeprazole (PRILOSEC) 40 MG DR capsule Take 1 capsule (40 mg) by mouth daily 90 capsule 3       Physical Exam:  /74 (BP Location: Left arm, Patient Position: Chair, Cuff Size: Adult Regular)   Pulse 96   Wt 66.7 kg (147 lb)   LMP  (LMP Unknown)   SpO2 99%   BMI 26.04 kg/m    Gen: alert, oriented, no distress  HEENT: no cervical or supraclavicular lymphadenopathy  CV: tachycardic, regular, no M/G/R  Resp: CTAB, no focal crackles or wheezes  Skin: no apparent rashes  Ext: no cyanosis, clubbing or edema  Neuro: alert, nonfocal    Labs:  reviewed    Imaging:  CXR (May 2023):  - images directly reviewed, formal interpretation follows:     Heart and mediastinal size are normal. The right hemidiaphragm is elevated. This is unchanged. Lungs and pleural spaces are clear. No pleural effusion or pneumothorax.     CT coronary calcium (May 2023):  - images directly reviewed, formal interpretation follows:  FINDINGS:       LIMITED  CHEST: Stable elevated right hemidiaphragm.     LIMITED MEDIASTINUM: Negative.     LIMITED UPPER ABDOMEN: Negative.                                                                   IMPRESSION:    1.  No significant incidental extracardiac findings.  2.  Please refer to cardiologist's dictation for the cardiac CT report.    CT sinuses (October 2023):  - images directly reviewed, formal interpretation follows:  FINDINGS:      FRONTAL SINUSES: Clear.     ETHMOID SINUSES: Clear.     SPHENOID SINUSES: Clear.      MAXILLARY SINUSES: Clear. The floors of the maxillary sinuses are intact.     NASAL CAVITY/SKULL BASE: Nasal septal deviation to the left with osseous spur deviated to the left abutting the left inferior nasal turbinate. Intact nasal septum. Elsie bullosa of the right middle turbinate. The cribriform plate is intact.      PARANASAL SINUS DRAINAGE PATHWAYS:  The paranasal sinus drainage pathways are patent. Bilateral Pippa cells.     NON-SINUS STRUCTURES: No abnormality of the visualized orbits or intracranial compartment. Visualized mastoid air cells bilaterally are essentially clear. Bilateral TMJ degenerative changes.                                                                   IMPRESSION:  1.  Paranasal sinuses are clear.  2.  No paranasal sinus air-fluid levels.     Esophagram (May 2023):  FINDINGS:   FLUOROSCOPIC TIME: 3.3  NUMBER OF IMAGES: 16      ESOPHAGUS:  No aspiration. There is slight narrowing of the proximal esophagus on lateral imaging, though a 12.5 mm tablet traversed this area without delay. This could represent a cricopharyngeus muscle impression. No other potential strictures. Primary   and secondary peristalsis result in emptying of the esophagus, though mild proximal escape is noted. Intermittently, large volumes of fluid reflux to the thoracic inlet. No hiatal hernia appreciated. No mucosal abnormalities or masses.  Elevated right   hemidiaphragm.                                                                    IMPRESSION:   1.  Intermittent reflux of fluid to the thoracic inlet, which could contribute to coughing. No hiatal hernia appreciated.  2.  No definite strictures. There is narrowing of the proximal esophagus on the lateral view though this could be from a cricopharyngeus muscle impression. A 12.5 mm tablet traversed this region without delay. Consider direct visualization if there is   concern for narrowing in this region.  3.  No aspiration on this study.  4.  Elevated right hemidiaphragm.     EGD (June 2023):  Findings:         -  The second portion of the duodenum was normal.          -  Patchy mild inflammation characterized by erythema was found in the   gastric            antrum and in the gastric body.          -  The gastroesophageal flap valve was visualized endoscopically and             classified as Hill Grade III (minimal fold, loose to endoscope, hiatal    hernia             likely).          -  The Z-line was regular and was found 40 cm from the incisors.    Biopsies            were taken with a cold forceps for histology.          -  The cricopharyngeus was normal.  mild mucous and particulate food   matter in             upper esophagus and cricopharyngeal region. no obvious lesions   Impression:         -  Normal second portion of the duodenum.          -  Gastritis.          -  Gastroesophageal flap valve classified as Hill Grade III (minimal   fold,            loose to endoscope, hiatal hernia likely).          -  Z-line regular, 40 cm from the incisors.  Biopsied.          -  Normal cricopharyngeus.          - mild mucous and particulate food matter in upper esophagus and             cricopharyngeal region. no obvious lesions      Pulmonary Function Testing  July 2023  FVC 2.41 (109%)  FEV1 2.09 (122%)  FEV1/FVC 0.87  No bronchodilator response.  RV 2.26 (102%)  TLC 4.93 (103%)  DLCOc 104%  Normal flow-volume loop    Time spent on chart and image review, meeting  with the patient to obtain history, perform physical exam, discuss test results, diagnostic possibilities, further testing options, treatment plan options, and care coordination: 34 minutes

## 2024-01-19 DIAGNOSIS — G25.81 RESTLESS LEG SYNDROME: ICD-10-CM

## 2024-01-19 NOTE — TELEPHONE ENCOUNTER
Medication Question or Refill    Contacts         Type Contact Phone/Fax    01/19/2024 04:45 PM CST Fax (Incoming) [facility] (MAIL SERVICE) [facility] - [address] AT [facility] (Pharmacy) [phone]            What medication are you calling about (include dose and sig)?: gabapentin (NEURONTIN) 300 MG capsule     Preferred Pharmacy:    [facility] (MAIL SERVICE) [facility] - [address]  [facility] [address]  Phone: [phone] Fax: [phone] Alternate Fax: [phone]      Controlled Substance Agreement on file:   CSA -- Patient Level:    CSA: None found at the patient level.       Who prescribed the medication?: [place]    Do you need a refill? Yes    When did you use the medication last? N/A

## 2024-01-22 RX ORDER — GABAPENTIN 300 MG/1
CAPSULE ORAL
Qty: 540 CAPSULE | Refills: 3 | Status: SHIPPED | OUTPATIENT
Start: 2024-01-22

## 2024-02-19 DIAGNOSIS — R05.3 CHRONIC COUGH: Primary | ICD-10-CM

## 2024-05-06 ENCOUNTER — OFFICE VISIT (OUTPATIENT)
Dept: FAMILY MEDICINE | Facility: CLINIC | Age: 84
End: 2024-05-06
Payer: COMMERCIAL

## 2024-05-06 VITALS
HEIGHT: 63 IN | DIASTOLIC BLOOD PRESSURE: 84 MMHG | SYSTOLIC BLOOD PRESSURE: 135 MMHG | TEMPERATURE: 97.6 F | RESPIRATION RATE: 20 BRPM | BODY MASS INDEX: 26.22 KG/M2 | HEART RATE: 96 BPM | OXYGEN SATURATION: 99 % | WEIGHT: 148 LBS

## 2024-05-06 DIAGNOSIS — R05.3 CHRONIC COUGH: Primary | ICD-10-CM

## 2024-05-06 DIAGNOSIS — Z29.11 NEED FOR VACCINATION AGAINST RESPIRATORY SYNCYTIAL VIRUS: ICD-10-CM

## 2024-05-06 DIAGNOSIS — Z23 NEED FOR SHINGLES VACCINE: ICD-10-CM

## 2024-05-06 PROCEDURE — 99214 OFFICE O/P EST MOD 30 MIN: CPT | Performed by: FAMILY MEDICINE

## 2024-05-06 RX ORDER — RESPIRATORY SYNCYTIAL VIRUS VACCINE 120MCG/0.5
0.5 KIT INTRAMUSCULAR ONCE
Qty: 1 EACH | Refills: 0 | Status: CANCELLED | OUTPATIENT
Start: 2024-05-06 | End: 2024-05-06

## 2024-05-06 ASSESSMENT — PAIN SCALES - GENERAL: PAINLEVEL: NO PAIN (0)

## 2024-05-06 NOTE — PROGRESS NOTES
"  1. Chronic cough  This is an 85 yo female with cough - this has been going on for a very long time - she has had multiple interventions (with inhalers, steroids, cough medications, PPI, antibiotics).  She has had multiple evaluations , including that by GI, and pulmonary.  In spite of all this, she is still really bothered by a cough - occurs mainly at night.  Her  endorses that she is worse at night.  No other obvious exposures.  She was to complete a further workup with GI (per Pulmonary) and she didn't want to do that.  She laments that she has had a lot of stuff done and just wants me to \"tix it\".  We discussed that there is no other obvious cause for her cough (?psychogenic).  Ultimately we have agreed that she should at least finish the workup with GI - and I have referred her back for this.    - Adult GI  Referral - Consult Only; Future    2. Need for shingles vaccine  3. Need for vaccination against respiratory syncytial virus  Due for shingles/RSV vaccines - these need to be done at pharmacy       Subjective   Deborah is a 84 year old, presenting for the following health issues:  Cough (Still having cough with  out improvement  )      5/6/2024     8:13 AM   Additional Questions   Roomed by sarbjit     Still having cough - phlegm   Cough - mostly in lower throat -   Phlegm - green yucky stuff that comes up   Once a day, goes through coughing - once it comes up , it's good for the day -   Doesn't cough when she sleeps   \"Tried everything\" - rx cough tablets, cough medicine with DM, hot water, hot coffee, tea  Tried Mucinex;     Did air quality study - inside/outside home - \"all fine\"    December 2022 - cough started - and has been problematic since that time       History of Present Illness       Reason for visit:  Constant cough and seeing all the specialists and not getting any answers    She eats 2-3 servings of fruits and vegetables daily.She consumes 2 sweetened beverage(s) daily.She " "exercises with enough effort to increase her heart rate 9 or less minutes per day.  She exercises with enough effort to increase her heart rate 3 or less days per week.   She is taking medications regularly.       Review of Systems   Constitutional:  Negative for chills and fever.   HENT:  Negative for ear pain and sore throat.         \"Phlegm\" in back of throat   Eyes:  Negative for pain and visual disturbance.   Respiratory:  Positive for cough. Negative for shortness of breath.    Cardiovascular:  Negative for chest pain and palpitations.   Gastrointestinal:  Negative for abdominal pain and vomiting.   Genitourinary:  Negative for dysuria and hematuria.   Musculoskeletal:  Negative for arthralgias and back pain.   Skin:  Negative for color change and rash.   Neurological:  Negative for seizures and syncope.   All other systems reviewed and are negative.          Objective    BP (!) 160/83 (BP Location: Right arm, Patient Position: Sitting, Cuff Size: Adult Regular)   Pulse 96   Temp 97.6  F (36.4  C) (Temporal)   Resp 20   Ht 1.6 m (5' 2.99\")   Wt 67.1 kg (148 lb)   LMP  (LMP Unknown)   SpO2 99%   BMI 26.22 kg/m    Body mass index is 26.22 kg/m .  Physical Exam  Vitals reviewed.   Constitutional:       General: She is not in acute distress.     Appearance: Normal appearance.   HENT:      Head: Normocephalic.      Right Ear: Tympanic membrane, ear canal and external ear normal.      Left Ear: Tympanic membrane, ear canal and external ear normal.      Nose: Nose normal.      Mouth/Throat:      Mouth: Mucous membranes are moist.      Pharynx: No posterior oropharyngeal erythema.   Eyes:      Extraocular Movements: Extraocular movements intact.      Conjunctiva/sclera: Conjunctivae normal.      Pupils: Pupils are equal, round, and reactive to light.   Cardiovascular:      Rate and Rhythm: Normal rate and regular rhythm.      Pulses: Normal pulses.      Heart sounds: Normal heart sounds. No murmur " heard.  Pulmonary:      Effort: Pulmonary effort is normal.      Breath sounds: Normal breath sounds.   Abdominal:      Palpations: Abdomen is soft. There is no mass.      Tenderness: There is no abdominal tenderness. There is no guarding or rebound.   Musculoskeletal:         General: No deformity. Normal range of motion.      Cervical back: Normal range of motion and neck supple.   Lymphadenopathy:      Cervical: No cervical adenopathy.   Skin:     General: Skin is warm and dry.   Neurological:      General: No focal deficit present.      Mental Status: She is alert.   Psychiatric:         Mood and Affect: Mood normal.         Behavior: Behavior normal.            No results found for any visits on 05/06/24.    Prior to immunization administration, verified patients identity using patient s name and date of birth. Please see Immunization Activity for additional information.     Screening Questionnaire for Adult Immunization    Are you sick today?   No   Do you have allergies to medications, food, a vaccine component or latex?   No   Have you ever had a serious reaction after receiving a vaccination?   No   Do you have a long-term health problem with heart, lung, kidney, or metabolic disease (e.g., diabetes), asthma, a blood disorder, no spleen, complement component deficiency, a cochlear implant, or a spinal fluid leak?  Are you on long-term aspirin therapy?   No   Do you have cancer, leukemia, HIV/AIDS, or any other immune system problem?   No   Do you have a parent, brother, or sister with an immune system problem?   No   In the past 3 months, have you taken medications that affect  your immune system, such as prednisone, other steroids, or anticancer drugs; drugs for the treatment of rheumatoid arthritis, Crohn s disease, or psoriasis; or have you had radiation treatments?   No   Have you had a seizure, or a brain or other nervous system problem?   No   During the past year, have you received a transfusion of  blood or blood    products, or been given immune (gamma) globulin or antiviral drug?   No   For women: Are you pregnant or is there a chance you could become       pregnant during the next month?   No   Have you received any vaccinations in the past 4 weeks?   No     Immunization questionnaire answers were all negative.      Patient instructed to remain in clinic for 15 minutes afterwards, and to report any adverse reactions.     Screening performed by Demetrice Tamayo MA on 5/6/2024 at 8:19 AM.         Signed Electronically by: DANICA LARA MD

## 2024-05-09 ENCOUNTER — TELEPHONE (OUTPATIENT)
Dept: FAMILY MEDICINE | Facility: CLINIC | Age: 84
End: 2024-05-09
Payer: COMMERCIAL

## 2024-05-09 DIAGNOSIS — R05.3 CHRONIC COUGH: Primary | ICD-10-CM

## 2024-05-09 NOTE — TELEPHONE ENCOUNTER
Order/Referral Request    Who is requesting: Patient     Orders being requested: There is a referral in the chart for [facility] the first opening was in December.  I was able to get them into [facility] next Wednesday    Reason service is needed/diagnosis: referral to [facility], esophogeal issues    When are orders needed by: ASAP    Has this been discussed with Provider: Yes    Does patient have a preference on a Group/Provider/Facility? Patient has an Appoint at [facility]    Does patient have an appointment scheduled?: Yes: 5/15/24    Where to send orders: Fax [phone]    Could we send this information to you in [employer] or would you prefer to receive a phone call?:   Patient would prefer a phone call   Okay to leave a detailed message?: Yes at Home number on file [phone] (home)

## 2024-05-12 DIAGNOSIS — I10 ESSENTIAL HYPERTENSION: ICD-10-CM

## 2024-05-12 ASSESSMENT — ENCOUNTER SYMPTOMS
COUGH: 1
EYE PAIN: 0
ABDOMINAL PAIN: 0
COLOR CHANGE: 0
SORE THROAT: 0
DYSURIA: 0
VOMITING: 0
PALPITATIONS: 0
ARTHRALGIAS: 0
FEVER: 0
CHILLS: 0
SHORTNESS OF BREATH: 0
HEMATURIA: 0
BACK PAIN: 0
SEIZURES: 0

## 2024-05-13 DIAGNOSIS — G60.9 HEREDITARY AND IDIOPATHIC PERIPHERAL NEUROPATHY: ICD-10-CM

## 2024-05-13 RX ORDER — HYDROCHLOROTHIAZIDE 25 MG/1
25 TABLET ORAL DAILY
Qty: 90 TABLET | Refills: 2 | Status: SHIPPED | OUTPATIENT
Start: 2024-05-13

## 2024-05-13 RX ORDER — NORTRIPTYLINE HYDROCHLORIDE 75 MG/1
75 CAPSULE ORAL AT BEDTIME
Qty: 90 CAPSULE | Refills: 2 | Status: SHIPPED | OUTPATIENT
Start: 2024-05-13 | End: 2024-09-17

## 2024-05-15 ENCOUNTER — TRANSFERRED RECORDS (OUTPATIENT)
Dept: HEALTH INFORMATION MANAGEMENT | Facility: CLINIC | Age: 84
End: 2024-05-15
Payer: COMMERCIAL

## 2024-05-24 ENCOUNTER — OFFICE VISIT (OUTPATIENT)
Dept: OTOLARYNGOLOGY | Facility: CLINIC | Age: 84
End: 2024-05-24
Attending: OTOLARYNGOLOGY
Payer: COMMERCIAL

## 2024-05-24 ENCOUNTER — OFFICE VISIT (OUTPATIENT)
Dept: AUDIOLOGY | Facility: CLINIC | Age: 84
End: 2024-05-24
Attending: OTOLARYNGOLOGY
Payer: COMMERCIAL

## 2024-05-24 DIAGNOSIS — H61.23 BILATERAL IMPACTED CERUMEN: Primary | ICD-10-CM

## 2024-05-24 DIAGNOSIS — R05.3 CHRONIC COUGH: ICD-10-CM

## 2024-05-24 DIAGNOSIS — R42 NONSPECIFIC DIZZINESS: ICD-10-CM

## 2024-05-24 DIAGNOSIS — H90.3 SENSORINEURAL HEARING LOSS (SNHL) OF BOTH EARS: Primary | ICD-10-CM

## 2024-05-24 DIAGNOSIS — H93.13 TINNITUS, BILATERAL: ICD-10-CM

## 2024-05-24 PROCEDURE — 99213 OFFICE O/P EST LOW 20 MIN: CPT | Mod: 25 | Performed by: OTOLARYNGOLOGY

## 2024-05-24 PROCEDURE — 69210 REMOVE IMPACTED EAR WAX UNI: CPT | Performed by: OTOLARYNGOLOGY

## 2024-05-24 PROCEDURE — 92557 COMPREHENSIVE HEARING TEST: CPT | Performed by: AUDIOLOGIST

## 2024-05-24 PROCEDURE — 92550 TYMPANOMETRY & REFLEX THRESH: CPT | Performed by: AUDIOLOGIST

## 2024-05-24 NOTE — PROGRESS NOTES
FOLLOW UP VISIT NOTE      HISTORY OF PRESENT ILLNESS    Deborah was seen in follow up after previous 5/1/2023 visit for ear cleaning  She feels like her hearing has gotten worse. She has difficulty with background noises, hearing her , hearing the TV.  Denies ringing in hear, no balance issues. She spends most of the day home alone.   She notes that she has had a chronic phlegmy cough. Cough only happens during the day. She has seen several doctors for this and feels very frustrated.   No sinus concerns.       REVIEW OF SYSTEMS    Review of Systems: a 10-system review is reviewed at this encounter.  See scanned document.       No Known Allergies        PHYSICAL EXAM:        HEAD: Normal appearance and symmetry:  No cutaneous lesions.      EARS:        RIGHT: cerumen impaction noted   LEFT:   cerumen impaction noted     CERUMEN IMPACTION REMOVAL    After obtaining verbal consent, and using the binocular microscope.  Cerumen impaction(s) were removed from the affected ear canal(s)  using a wire loops and/or suction.  The patient tolerated the procedure well without incident.    NOSE:    [place]:   straight       ORAL CAVITY/OROPHARYNX:    Lips:  Normal.     NECK:  Trachea:  midline     NEURO:   Alert and Oriented    GAIT AND STATION:  normal     RESPIRATORY:   Symmetry and Respiratory effort    PSYCH:   normal mood and affect    SKIN:  warm and dry         IMPRESSION:   Encounter Diagnoses   Name Primary?    Bilateral impacted cerumen Yes    Chronic cough     Nonspecific dizziness             RECOMMENDATIONS:  -Consider hearing aids  -Consider balance therapy in the future  -Consider speech therapy in the future  -Reflux precautions  -Return visit 6 months for ear cleaning

## 2024-05-24 NOTE — PROGRESS NOTES
AUDIOLOGY REPORT    SUMMARY: Audiology visit completed. Deborah is accompanied by her spouse. See audiogram for results.     RECOMMENDATIONS: Follow-up with ENT for ear cleaning in 6 months as scheduled. Provided copy of the audiogram. Hearing aid candidate if motivated.    Alfonso Mccarty, [facility]  [place] Licensed Audiologist #[id number]

## 2024-05-24 NOTE — LETTER
5/24/2024         RE: Deborah Mireles  [address]  [place] [address]        Dear Colleague,    Thank you for referring your patient, Deborah Mireles, to the [facility]. Please see a copy of my visit note below.    FOLLOW UP VISIT NOTE      HISTORY OF PRESENT ILLNESS    Deborah was seen in follow up after previous 5/1/2023 visit for ear cleaning  She feels like her hearing has gotten worse. She has difficulty with background noises, hearing her , hearing the TV.  Denies ringing in hear, no balance issues. She spends most of the day home alone.   She notes that she has had a chronic phlegmy cough. Cough only happens during the day. She has seen several doctors for this and feels very frustrated.   No sinus concerns.       REVIEW OF SYSTEMS    Review of Systems: a 10-system review is reviewed at this encounter.  See scanned document.       No Known Allergies        PHYSICAL EXAM:        HEAD: Normal appearance and symmetry:  No cutaneous lesions.      EARS:        RIGHT: cerumen impaction noted   LEFT:   cerumen impaction noted     CERUMEN IMPACTION REMOVAL    After obtaining verbal consent, and using the binocular microscope.  Cerumen impaction(s) were removed from the affected ear canal(s)  using a wire loops and/or suction.  The patient tolerated the procedure well without incident.    NOSE:    Dorsum:   straight       ORAL CAVITY/OROPHARYNX:    Lips:  Normal.     NECK:  Trachea:  midline     NEURO:   Alert and Oriented    GAIT AND STATION:  normal     RESPIRATORY:   Symmetry and Respiratory effort    PSYCH:   normal mood and affect    SKIN:  warm and dry         IMPRESSION:   Encounter Diagnoses   Name Primary?     Bilateral impacted cerumen Yes     Chronic cough      Nonspecific dizziness             RECOMMENDATIONS:  -Consider hearing aids  -Consider balance therapy in the future  -Consider speech therapy in the future  -Reflux precautions  -Return visit 6  months for ear cleaning      Again, thank you for allowing me to participate in the care of your patient.        Sincerely,        Jose Miguel Rodriguez MD

## 2024-05-24 NOTE — PATIENT INSTRUCTIONS
-Consider hearing aids  -Follow up in 6 months  -Consider balance therapy and/or seeing a speech therapy in the future- call us or send a message via [facility] if you would like either of these referrals  -Decrease the amount of acidic food/drinks to see if that will help with your phlegmy cough (see below)      Acid Suppression Treatment    Lifestyle changes:    Avoid eating 2-3 hours before bedtime.   You may find it helpful to elevate the head of your bed.     Avoid following foods that are likely to trigger acid reflux:    Coffee or tea (try LOW ACID coffee or herbal tea)  Anything that s fizzy or has caffeine in it  Alcohol   Citrus fruits, such as oranges and chad  Tomato based foods (salsa, pizza, lasanga)  Chocolate   Mint or peppermint  Fatty foods (ice cream)  Spicy foods  Onions and garlic      Try alkaline water (ph 9.5) instead of regular water

## 2024-08-01 ENCOUNTER — OFFICE VISIT (OUTPATIENT)
Dept: ALLERGY | Facility: CLINIC | Age: 84
End: 2024-08-01
Attending: FAMILY MEDICINE
Payer: COMMERCIAL

## 2024-08-01 VITALS — HEART RATE: 104 BPM | WEIGHT: 147 LBS | BODY MASS INDEX: 26.05 KG/M2 | OXYGEN SATURATION: 96 %

## 2024-08-01 DIAGNOSIS — J31.0 CHRONIC RHINITIS: ICD-10-CM

## 2024-08-01 DIAGNOSIS — R05.3 CHRONIC COUGH: Primary | ICD-10-CM

## 2024-08-01 PROCEDURE — 95004 PERQ TESTS W/ALRGNC XTRCS: CPT | Performed by: ALLERGY & IMMUNOLOGY

## 2024-08-01 PROCEDURE — 99203 OFFICE O/P NEW LOW 30 MIN: CPT | Mod: 25 | Performed by: ALLERGY & IMMUNOLOGY

## 2024-08-01 RX ORDER — OMEPRAZOLE 40 MG/1
40 CAPSULE, DELAYED RELEASE ORAL DAILY
COMMUNITY
Start: 2024-05-15

## 2024-08-01 NOTE — PATIENT INSTRUCTIONS
Allergy testing negative    Other options: [facility]'s voice clinic, Dr. Polanco, Chronic cough clinic---

## 2024-08-01 NOTE — PROGRESS NOTES
Rosa Isela Cabrera is a 84 year old, presenting for the following health issues:  Allergy Consult (Chronic cough)    HPI     Chief complaint: Chronic cough    History of present illness: This is a pleasant 84 year old woman that I was asked to see for evaluation by Dr. Woodall in regards to chronic cough.  Cough began in December 2022.  She states that she feels like the cough builds up over time where she has a lot of phlegm that sits on her vocal cords and then she will have to cough it out.  She will cough in clusters.  She has been seen gastroenterology and has a follow-up visit with them soon as she was noted to have some reflux on her esophagram.  She was seen by pulmonary and underwent pulmonary function test with methacholine challenge which was negative.  Previous chest x-rays have been reported to be negative as well.  She was seen by ENT who mentioned speech therapy.  She is here today to see if allergies could be causing her cough.  She denies any postnasal drainage.  No previous allergy testing.  She does not use any allergy medication regularly.  She was tried on Flonase, which she reports did not help.    Past medical history:  Hypertension,    Social history: She is  and retired  lives in a [facility] with central air, no pets    Family history positive for allergies        Objective    Pulse 104   Wt 66.7 kg (147 lb)   LMP  (LMP Unknown)   SpO2 96%   BMI 26.05 kg/m    Body mass index is 26.05 kg/m .  Physical Exam   Gen: Pleasant female not in acute distress  HEENT: Eyes no erythema of the bulbar or palpebral conjunctiva, no edema. Ears: TMs well visualized, no effusions. Nose: No congestion, mucosa normal. Mouth: Throat clear, no lip or tongue edema.   Respiratory: Clear to auscultation bilaterally, no adventitious breath sounds  Skin: No rashes or lesions  Psych: Alert and oriented times 3    At today s visit the patient/parent and I engaged in an informed  consent discussion about allergy testing.  We discussed skin testing, blood testing,  and the alternative of not undergoing any testing. The patient/ parent has a preference for skin testing. We then discussed the risks and benefits of skin testing.  The patient/ parent understands skin testing risks can include, but are not limited to, urticaria, angioedema, shortness of breath, and severe anaphylaxis.  The benefits include, but are not limited, to evaluation for allergens causing symptoms.  After answering the patients/parents questions they have agreed to proceed with skin testing.    30 percutaneous test were undertaken to the environmental skin test panel.  Positive histamine control with a negative allergy skin test.  Please see scanned photograph.    Impression report and plan:  1.  Chronic cough    Allergy testing was negative.  I am not sure I have much more to offer, however, could consider referral to the [facility] as discussed by ENT.  Could also consider referral to the chronic cough clinic at [facility].  Follow as needed.    Signed Electronically by: Juana VANESSA MD

## 2024-08-01 NOTE — LETTER
8/1/2024      Deborah Mireles  [address]  [place] [address]      Dear Colleague,    Thank you for referring your patient, Deborah Mireles, to the [facility]. Please see a copy of my visit note below.          Rosa Isela Cabrera is a 84 year old, presenting for the following health issues:  Allergy Consult (Chronic cough)    HPI     Chief complaint: Chronic cough    History of present illness: This is a pleasant 84 year old woman that I was asked to see for evaluation by Dr. Woodall in regards to chronic cough.  Cough began in December 2022.  She states that she feels like the cough builds up over time where she has a lot of phlegm that sits on her vocal cords and then she will have to cough it out.  She will cough in clusters.  She has been seen gastroenterology and has a follow-up visit with them soon as she was noted to have some reflux on her esophagram.  She was seen by pulmonary and underwent pulmonary function test with methacholine challenge which was negative.  Previous chest x-rays have been reported to be negative as well.  She was seen by ENT who mentioned speech therapy.  She is here today to see if allergies could be causing her cough.  She denies any postnasal drainage.  No previous allergy testing.  She does not use any allergy medication regularly.  She was tried on Flonase, which she reports did not help.    Past medical history:  Hypertension,    Social history: She is  and retired  [facility] [place] [facility] [place], [place]    Family history positive for allergies        Objective   Pulse 104   Wt 66.7 kg (147 lb)   LMP  (LMP Unknown)   SpO2 96%   BMI 26.05 kg/m    Body mass index is 26.05 kg/m .  Physical Exam   Gen: Pleasant female not in acute distress  HEENT: Eyes no erythema of the bulbar or palpebral conjunctiva, no edema. Ears: TMs well visualized, no effusions. Nose: No congestion, mucosa normal. Mouth:  Throat clear, no lip or tongue edema.   Respiratory: Clear to auscultation bilaterally, no adventitious breath sounds  Skin: No rashes or lesions  Psych: Alert and oriented times 3    At today s visit the patient/parent and I engaged in an informed consent discussion about allergy testing.  We discussed skin testing, blood testing,  and the alternative of not undergoing any testing. The patient/ parent has a preference for skin testing. We then discussed the risks and benefits of skin testing.  The patient/ parent understands skin testing risks can include, but are not limited to, urticaria, angioedema, shortness of breath, and severe anaphylaxis.  The benefits include, but are not limited, to evaluation for allergens causing symptoms.  After answering the patients/parents questions they have agreed to proceed with skin testing.    30 percutaneous test were undertaken to the environmental skin test panel.  Positive histamine control with a negative allergy skin test.  Please see scanned photograph.    Impression report and plan:  1.  Chronic cough    Allergy testing was negative.  I am not sure I have much more to offer, however, could consider referral to the [facility] as discussed by ENT.  Could also consider referral to the chronic cough clinic at [facility].  Follow as needed.    Signed Electronically by: Juana VANESSA MD        Again, thank you for allowing me to participate in the care of your patient.        Sincerely,        Juana VANESSA MD

## 2024-08-06 ENCOUNTER — TELEPHONE (OUTPATIENT)
Dept: FAMILY MEDICINE | Facility: CLINIC | Age: 84
End: 2024-08-06
Payer: COMMERCIAL

## 2024-08-06 NOTE — TELEPHONE ENCOUNTER
General Call    Contacts       Contact Date/Time Type Contact Phone/Fax    08/06/2024 01:48 PM CDT Phone (Outgoing) Deborah Mireles (Self) [phone] (M)    Left Message           Reason for Call:  schedule AWV    What are your questions or concerns:  Patient is due for AWV on 10/27/2024. Writer called and lvm for patient to call back. If patient calls back, please assist patient with scheduling AWV, thanks!    Date of last appointment with provider: 5/6/2024    Could we send this information to you in [employer] or would you prefer to receive a phone call?:   Patient would prefer a phone call   Okay to leave a detailed message?: Yes at Cell number on file:    Telephone Information:   Mobile [phone]

## 2024-09-06 ENCOUNTER — TRANSFERRED RECORDS (OUTPATIENT)
Dept: HEALTH INFORMATION MANAGEMENT | Facility: CLINIC | Age: 84
End: 2024-09-06
Payer: COMMERCIAL

## 2024-09-12 ENCOUNTER — TRANSFERRED RECORDS (OUTPATIENT)
Dept: HEALTH INFORMATION MANAGEMENT | Facility: CLINIC | Age: 84
End: 2024-09-12
Payer: COMMERCIAL

## 2024-09-16 ENCOUNTER — TRANSCRIBE ORDERS (OUTPATIENT)
Dept: OTHER | Age: 84
End: 2024-09-16

## 2024-09-16 DIAGNOSIS — R05.3 CHRONIC COUGH: Primary | ICD-10-CM

## 2024-09-17 DIAGNOSIS — G47.00 PERSISTENT INSOMNIA: ICD-10-CM

## 2024-09-17 DIAGNOSIS — G25.81 RESTLESS LEG SYNDROME: Primary | ICD-10-CM

## 2024-09-17 RX ORDER — NORTRIPTYLINE HCL 10 MG
10-20 CAPSULE ORAL AT BEDTIME
Qty: 180 CAPSULE | Refills: 1 | Status: SHIPPED | OUTPATIENT
Start: 2024-09-17

## 2024-09-26 ENCOUNTER — HOSPITAL ENCOUNTER (OUTPATIENT)
Dept: SPEECH THERAPY | Facility: HOSPITAL | Age: 84
Discharge: HOME | End: 2024-09-26
Attending: INTERNAL MEDICINE
Payer: COMMERCIAL

## 2024-09-26 ENCOUNTER — HOSPITAL ENCOUNTER (OUTPATIENT)
Dept: RADIOLOGY | Facility: HOSPITAL | Age: 84
Discharge: HOME | End: 2024-09-26
Attending: INTERNAL MEDICINE
Payer: COMMERCIAL

## 2024-09-26 DIAGNOSIS — R05.3 CHRONIC COUGH: ICD-10-CM

## 2024-09-26 PROCEDURE — 92611 MOTION FLUOROSCOPY/SWALLOW: CPT | Mod: GN

## 2024-09-26 PROCEDURE — 92610 EVALUATE SWALLOWING FUNCTION: CPT | Mod: GN

## 2024-09-26 PROCEDURE — 74230 X-RAY XM SWLNG FUNCJ C+: CPT

## 2024-09-26 NOTE — PROGRESS NOTES
SPEECH LANGUAGE PATHOLOGY EVALUATION     Fall Risk Screen:  Fall screen completed by: SLP  Have you fallen 2 or more times in the past year?: No  Have you fallen and had an injury in the past year?: No  Is patient a fall risk?: No    Subjective      Presenting condition or subjective complaint: chronic cough for the past few years, coughing up phlegm throughout the day. She's had work ups by GI, ENT, Allergy/Immunology, and Pulmonary. Previous EGD and esophagram 2023 noting mucous and food particulate in the upper esophagus and cricopharyngeus.   She has EGD scheduled in the next couple of weeks.  Objective     SWALLOW EVALUTION  Dysphagia history: no known oral pharyngeal dysphagia  Current Diet/Method of Nutritional Intake: thin liquids (level 0), regular diet        CLINICAL SWALLOW EVALUATION  Oral Motor Function: generally intact  Dentition: adequate dentition  Secretion management: WFL  Mucosal quality: dry  Mandibular function: intact  Oral labial function: WFL  Lingual function: WFL  Buccal function: intact  Laryngeal function: throat clear, voicing WFL   VIDEOFLUOROSCOPIC SWALLOW STUDY  Radiologist: Dr Lynn  Views Taken: left lateral, A/P   Physical location of procedure: 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    VFSS Eval:  Purees  Mode of Presentation: spoon   Order of Presentation: 4, 5, 8  Preparatory Phase: WFL  Oral Phase: premature pharyngeal entry  Bolus Location When Swallow Initiated: valleculae  Pharyngeal Phase: impaired pharyngoesophageal segment opening  Rosenbeck s Penetration Aspiration Scale: 1 - no aspiration, contrast does not enter airway  Response to Aspiration:  NA  Strategies and Compensations: liquid wash, double swallow  Diagnostic Statement: small amount of barium retention just below the cricopharyngeus, clears with sips of liquid.    VFSS Eval: Solids  Mode of Presentation: spoon   Order of Presentation: 6, 7  Preparatory Phase: WFL  Oral Phase: premature pharyngeal entry  Bolus Location When Swallow Initiated: valleculae  Pharyngeal Phase: impaired pharyngoesophageal segment opening   Rosenbeck s Penetration Aspiration Scale: 1 - no aspiration, contrast does not enter airway  Response to Aspiration:  NA  Strategies and Compensations: liquid wash, double swallow  Diagnostic Statement: small amount of barium retention just below the cricopharyngeus, clears with sips of liquid.     ESOPHAGEAL PHASE OF SWALLOW  please refer to radiologist's report for details     SWALLOW ASSESSMENT CLINICAL IMPRESSIONS AND RATIONALE  Diet Consistency Recommendations: thin liquids (level 0), regular diet    Recommended Feeding/Eating Techniques:  take frequent sips of liquid after bites of food    Medication Administration Recommendations: patient preference  IAssessment & Plan   CLINICAL IMPRESSIONS   Impression/Assessment:  Patient presents with normal oral phase. Swallow is minimally delayed with boluses spilling to the valleculae or past the epiglottis. There is good tongue back retraction and hyolaryngeal movement. Epiglottis inverts and maintains closure during rapid consecutive swallows. There is no laryngeal penetration or aspiration with any consistency. No pharyngeal stasis. 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 Osteophytes at C4-C6 create an impression on the UES segment and cricopharyngeus also appears prominent. This results in a narrow luminal opening. There is no stasis above the UES, however there is retention of puree and solid textures below the cricopharyngeus. This clears with sips of liquid.    PLAN OF CARE  Recommended Referrals to Other Professionals:  follow up with GI  Education Assessment:   Learner/Method: Patient;Listening;Pictures/Video  Education Comments: Utilized discussion and imaging playback to educate patient on results of study  Evaluation Time:    SLP Eval: oral/pharyngeal swallow function, clinical minutes (88970): 8  SLP Eval: VideoFluoroscopic Swallow function Minutes (66723): 18    Signing Clinician: KELLE Simon      [facility]                                                                                   OUTPATIENT SPEECH LANGUAGE PATHOLOGY

## 2024-09-27 ENCOUNTER — PATIENT OUTREACH (OUTPATIENT)
Dept: CARE COORDINATION | Facility: CLINIC | Age: 84
End: 2024-09-27
Payer: COMMERCIAL

## 2024-10-01 ENCOUNTER — TRANSFERRED RECORDS (OUTPATIENT)
Dept: HEALTH INFORMATION MANAGEMENT | Facility: CLINIC | Age: 84
End: 2024-10-01
Payer: COMMERCIAL

## 2024-10-04 DIAGNOSIS — G47.00 PERSISTENT INSOMNIA: ICD-10-CM

## 2024-10-04 DIAGNOSIS — G25.81 RESTLESS LEG SYNDROME: Primary | ICD-10-CM

## 2024-10-04 RX ORDER — NORTRIPTYLINE HYDROCHLORIDE 25 MG/1
25 CAPSULE ORAL AT BEDTIME
Qty: 30 CAPSULE | Refills: 1 | Status: SHIPPED | OUTPATIENT
Start: 2024-10-04

## 2024-10-09 ENCOUNTER — TRANSFERRED RECORDS (OUTPATIENT)
Dept: HEALTH INFORMATION MANAGEMENT | Facility: CLINIC | Age: 84
End: 2024-10-09
Payer: COMMERCIAL

## 2024-10-11 ENCOUNTER — PATIENT OUTREACH (OUTPATIENT)
Dept: CARE COORDINATION | Facility: CLINIC | Age: 84
End: 2024-10-11
Payer: COMMERCIAL

## 2024-10-23 ENCOUNTER — TRANSFERRED RECORDS (OUTPATIENT)
Dept: HEALTH INFORMATION MANAGEMENT | Facility: CLINIC | Age: 84
End: 2024-10-23
Payer: COMMERCIAL

## 2024-10-25 ENCOUNTER — TRANSFERRED RECORDS (OUTPATIENT)
Dept: HEALTH INFORMATION MANAGEMENT | Facility: CLINIC | Age: 84
End: 2024-10-25
Payer: COMMERCIAL

## 2024-11-18 ENCOUNTER — OFFICE VISIT (OUTPATIENT)
Dept: FAMILY MEDICINE | Facility: CLINIC | Age: 84
End: 2024-11-18
Payer: COMMERCIAL

## 2024-11-18 VITALS
HEART RATE: 95 BPM | BODY MASS INDEX: 25.34 KG/M2 | RESPIRATION RATE: 18 BRPM | SYSTOLIC BLOOD PRESSURE: 143 MMHG | TEMPERATURE: 97.6 F | HEIGHT: 63 IN | WEIGHT: 143 LBS | OXYGEN SATURATION: 98 % | DIASTOLIC BLOOD PRESSURE: 86 MMHG

## 2024-11-18 DIAGNOSIS — I10 ESSENTIAL HYPERTENSION: Primary | ICD-10-CM

## 2024-11-18 DIAGNOSIS — R15.9 INCONTINENCE OF FECES WITH FECAL URGENCY: ICD-10-CM

## 2024-11-18 DIAGNOSIS — Z23 NEED FOR COVID-19 VACCINE: ICD-10-CM

## 2024-11-18 DIAGNOSIS — I10 ESSENTIAL HYPERTENSION: ICD-10-CM

## 2024-11-18 DIAGNOSIS — Z29.11 NEED FOR VACCINATION AGAINST RESPIRATORY SYNCYTIAL VIRUS: ICD-10-CM

## 2024-11-18 DIAGNOSIS — G25.81 RESTLESS LEG SYNDROME: ICD-10-CM

## 2024-11-18 DIAGNOSIS — Z23 NEED FOR SHINGLES VACCINE: ICD-10-CM

## 2024-11-18 DIAGNOSIS — Z23 NEED FOR IMMUNIZATION AGAINST INFLUENZA: ICD-10-CM

## 2024-11-18 DIAGNOSIS — Z82.49 FAMILY HISTORY OF ISCHEMIC HEART DISEASE: ICD-10-CM

## 2024-11-18 DIAGNOSIS — R05.3 CHRONIC COUGH: ICD-10-CM

## 2024-11-18 DIAGNOSIS — Z00.00 ENCOUNTER FOR MEDICARE ANNUAL WELLNESS EXAM: Primary | ICD-10-CM

## 2024-11-18 DIAGNOSIS — R15.2 INCONTINENCE OF FECES WITH FECAL URGENCY: ICD-10-CM

## 2024-11-18 DIAGNOSIS — G47.00 PERSISTENT INSOMNIA: ICD-10-CM

## 2024-11-18 LAB
ERYTHROCYTE [DISTWIDTH] IN BLOOD BY AUTOMATED COUNT: 13 % (ref 10–15)
HCT VFR BLD AUTO: 42.2 % (ref 35–47)
HGB BLD-MCNC: 14.1 G/DL (ref 11.7–15.7)
MCH RBC QN AUTO: 30.7 PG (ref 26.5–33)
MCHC RBC AUTO-ENTMCNC: 33.4 G/DL (ref 31.5–36.5)
MCV RBC AUTO: 92 FL (ref 78–100)
PLATELET # BLD AUTO: 321 10E3/UL (ref 150–450)
RBC # BLD AUTO: 4.6 10E6/UL (ref 3.8–5.2)
WBC # BLD AUTO: 6.4 10E3/UL (ref 4–11)

## 2024-11-18 PROCEDURE — 90480 ADMN SARSCOV2 VAC 1/ONLY CMP: CPT | Performed by: FAMILY MEDICINE

## 2024-11-18 PROCEDURE — 85027 COMPLETE CBC AUTOMATED: CPT | Performed by: FAMILY MEDICINE

## 2024-11-18 PROCEDURE — 90662 IIV NO PRSV INCREASED AG IM: CPT | Performed by: FAMILY MEDICINE

## 2024-11-18 PROCEDURE — 80061 LIPID PANEL: CPT | Performed by: FAMILY MEDICINE

## 2024-11-18 PROCEDURE — 36415 COLL VENOUS BLD VENIPUNCTURE: CPT | Performed by: FAMILY MEDICINE

## 2024-11-18 PROCEDURE — G0008 ADMIN INFLUENZA VIRUS VAC: HCPCS | Performed by: FAMILY MEDICINE

## 2024-11-18 PROCEDURE — 80053 COMPREHEN METABOLIC PANEL: CPT | Performed by: FAMILY MEDICINE

## 2024-11-18 PROCEDURE — 91320 SARSCV2 VAC 30MCG TRS-SUC IM: CPT | Performed by: FAMILY MEDICINE

## 2024-11-18 PROCEDURE — G0439 PPPS, SUBSEQ VISIT: HCPCS | Performed by: FAMILY MEDICINE

## 2024-11-18 RX ORDER — ASPIRIN 81 MG/1
81 TABLET ORAL DAILY
COMMUNITY

## 2024-11-18 SDOH — HEALTH STABILITY: PHYSICAL HEALTH: ON AVERAGE, HOW MANY MINUTES DO YOU ENGAGE IN EXERCISE AT THIS LEVEL?: 30 MIN

## 2024-11-18 SDOH — HEALTH STABILITY: PHYSICAL HEALTH: ON AVERAGE, HOW MANY DAYS PER WEEK DO YOU ENGAGE IN MODERATE TO STRENUOUS EXERCISE (LIKE A BRISK WALK)?: 2 DAYS

## 2024-11-18 ASSESSMENT — SOCIAL DETERMINANTS OF HEALTH (SDOH): HOW OFTEN DO YOU GET TOGETHER WITH FRIENDS OR RELATIVES?: TWICE A WEEK

## 2024-11-18 NOTE — PROGRESS NOTES
Preventive Care Visit  [facility]  DANICA LARA MD, Family Medicine  2024  {Provider  Link to SmartSet :368523}        Rosa Isela Cabrera is a 84 year old, presenting for the following:  Physical (Pt would like heart test brothers had heart attack and dad), Sleep Problem (Pt stated some night don't sleep  fatigue ), and Bowel Problems (Irregular bowels movement some )        2024     1:41 PM   Additional Questions   Roomed by Obey     {ROOMER if patient is in their first year of Medicare a vision screen is required click here to document the Vison screen and then refresh the note to pull in results  :971032}      Cough has improved a lot -   3-4 times/day   Just coughs when has to get phlegm up     GI put her on :Omeprazole  Wonders about ASA   Has 4 brothers; - 2 , 1 has had multiple MI, 1 has had problems as well; dad also  of heart disease - his brothers all had heart disease; no h/o females in family with heart disease; patient and her 2 older brothers are all fine   Patient had 8 brothers -    Lost weight from 150 to 146  Not hungry as much   Doesn't want to eat    Sleep is disturbed     Bowels are irregular -   Is embarrassed to be in public       ***  {MA/LPN/RN Pre-Provider Visit Orders- hCG/UA/Strep (Optional):600420}  {SUPERLIST (Optional):015912}  {additonal problems for provider to add (Optional):479678}  Health Care Directive  Patient has a Health Care Directive on file  Advance care planning document is on file and is current.      2024   General Health   How would you rate your overall physical health? Good   Feel stress (tense, anxious, or unable to sleep) Only a little      (!) STRESS CONCERN      2024   Nutrition   Diet: Regular (no restrictions)            2024   Exercise   Days per week of moderate/strenous exercise 2 days   Average minutes spent exercising at this level 30 min      (!) EXERCISE CONCERN       11/18/2024   Social Factors   Frequency of gathering with friends or relatives Twice a week   Worry food won't last until get money to buy more No   Food not last or not have enough money for food? No   Do you have housing? (Housing is defined as stable permanent housing and does not include staying ouside in a car, in a tent, in an abandoned building, in an overnight shelter, or couch-surfing.) Yes   Are you worried about losing your housing? No   Lack of transportation? No   Unable to get utilities (heat,electricity)? No            11/18/2024   Fall Risk   Fallen 2 or more times in the past year? No    Trouble with walking or balance? No        Patient-reported          11/18/2024   Activities of Daily Living- Home Safety   Needs help with the following daily activites None of the above   Safety concerns in the home None of the above            11/18/2024   Dental   Dentist two times every year? (!) NO            11/18/2024   Hearing Screening   Hearing concerns? (!) I FEEL THAT PEOPLE ARE MUMBLING OR NOT SPEAKING CLEARLY.            11/18/2024   Driving Risk Screening   Patient/family members have concerns about driving No            11/18/2024   General Alertness/Fatigue Screening   Have you been more tired than usual lately? (!) YES            11/18/2024   Urinary Incontinence Screening   Bothered by leaking urine in past 6 months No            11/18/2024   TB Screening   Were you born outside of the [place]? No          {Rooming Staff Patient needs a PHQ as part of the AWV.  Use this link to complete and then refresh the note to pull results Link to PHQ2 Assessment :904778}  {USE TO PULL IN PHQ RESULTS FOR TODAY:682625}      11/18/2024   Substance Use   Alcohol more than 3/day or more than 7/wk No   Do you have a current opioid prescription? No   How severe/bad is pain from 1 to 10? 0/10 (No Pain)   Do you use any other substances recreationally? No        Social History     Tobacco Use    Smoking status: Never      Passive exposure: Never    Smokeless tobacco: Never   Vaping Use    Vaping status: Never Used   Substance Use Topics    Alcohol use: No    Drug use: No     {Provider  If there are gaps in the social history shown above, please follow the link to update and then refresh the note Link to Social and Substance History :483953}     {Mammogram Decision Support (Optional):860478}      {Link to Fracture Risk Assessment Tool (Optional):019685}    {Provider  REQUIRED FOR AWV Use the storyboard to review patient history, after sections have been marked as reviewed, refresh note to capture documentation:646975}  {Provider   REQUIRED AWV use this link to review and update sexual activity history  after section has been marked as reviewed, refresh note to capture documentation:663277}  Reviewed and updated as needed this visit by Provider                    {HISTORY OPTIONS (Optional):300774}  Current providers sharing in care for this patient include:  Patient Care Team:  Emmanuelle Woodall MD as PCP - General (Family Medicine)  Emmanuelle Woodall MD as Assigned PCP  Jose Miguel Rodriguez MD as MD (Otolaryngology)  Lucas Lopez MD as Assigned Pulmonology Provider  Juana Koo MD as MD (Allergy & Immunology)  Manas Michelle DO as Physician (Gastroenterology)  Jose Miguel Rodriguez MD as Assigned Surgical Provider  Juana Koo MD as Assigned Allergy Provider    The following health maintenance items are reviewed in Epic and correct as of today:  Health Maintenance   Topic Date Due    RSV VACCINE (1 - 1-dose 75+ series) Never done    ZOSTER IMMUNIZATION (3 of 3) 11/03/2020    INFLUENZA VACCINE (1) 09/01/2024    COVID-19 Vaccine (5 - 2024-25 season) 09/01/2024    BMP  10/27/2024    MEDICARE ANNUAL WELLNESS VISIT  10/27/2024    ANNUAL REVIEW OF HM ORDERS  05/06/2025    FALL RISK ASSESSMENT  11/18/2025    ADVANCE CARE PLANNING  11/18/2029    DTAP/TDAP/TD IMMUNIZATION (4 - Td or Tdap) 05/02/2032     "DEXA  09/30/2035    PHQ-2 (once per calendar year)  Completed    Pneumococcal Vaccine: 65+ Years  Completed    HPV IMMUNIZATION  Aged Out    MENINGITIS IMMUNIZATION  Aged Out    RSV MONOCLONAL ANTIBODY  Aged Out       {ROS Picklists (Optional):818598}     Objective    Exam  BP (!) 143/86 (BP Location: Right arm, Patient Position: Sitting, Cuff Size: Adult Regular)   Pulse 95   Temp 97.6  F (36.4  C) (Temporal)   Resp 18   Ht 1.6 m (5' 2.99\")   Wt 64.9 kg (143 lb)   LMP  (LMP Unknown)   SpO2 98%   BMI 25.34 kg/m     Estimated body mass index is 25.34 kg/m  as calculated from the following:    Height as of this encounter: 1.6 m (5' 2.99\").    Weight as of this encounter: 64.9 kg (143 lb).    Physical Exam  {Exam Choices (Optional):956140}        11/18/2024   Mini Cog   Clock Draw Score 2 Normal   3 Item Recall 2 objects recalled   Mini Cog Total Score 4        {A Mini-Cog total score of 0-2 suggests the possibility of dementia, score of 3-5 suggests no dementia:899667}         Signed Electronically by: DANICA LARA MD  {Email feedback regarding this note to [email or web address]   :564797}  "     1 Term              BP Readings from Last 3 Encounters:   11/18/24 (!) 143/86   05/06/24 135/84   01/15/24 134/74    Wt Readings from Last 3 Encounters:   11/18/24 64.9 kg (143 lb)   08/01/24 66.7 kg (147 lb)   05/06/24 67.1 kg (148 lb)                  Patient Active Problem List   Diagnosis    Chronic Constipation    Osteoarthritis Of The Knee    Osteoporosis    Seborrheic Dermatitis Of The Scalp    Peripheral Neuropathy    Asymptomatic Coronary Arteriosclerosis    Restless Legs Syndrome    Hypertension    Spinal Stenosis    Patellofemoral Syndrome    Onychomycosis Of The Toenails    Skin tag    Pseudotumor (inflammatory) of orbit, right    Right flank pain, chronic    Hammertoe of second toe of right foot    Bilateral hearing loss    Tinnitus, bilateral    Bunion, right    Primary osteoarthritis of right hip - s/p right hip replacement    Hallux limitus, right    Chronic cough     Past Surgical History:   Procedure Laterality Date    ABDOMEN SURGERY      APPENDECTOMY      BACK SURGERY      CHOLECYSTECTOMY      COSMETIC SURGERY      blepharoplasty    ESOPHAGOSCOPY, GASTROSCOPY, DUODENOSCOPY (EGD), COMBINED N/A 6/27/2023    Procedure: ESOPHAGOGASTRODUODENOSCOPY, WITH BIOPSY;  Surgeon: Sabas Georges DO;  Location[facility] OR    EYE SURGERY      FRACTURE SURGERY      HC DILATION/CURETTAGE DIAG/THER NON OB      Description: Dilation And Curettage;  Recorded: 02/02/2010;  Comments: due to miscarriage    HC KNEE SCOPE, DIAGNOSTIC      Description: Arthroscopy Knee Right;  Recorded: 02/02/2010;  Comments: right knee - arthroscopy for debridement; then Synvisc    LUMBAR FUSION  08/01/2013       Social History     Tobacco Use    Smoking status: Never     Passive exposure: Never    Smokeless tobacco: Never   Substance Use Topics    Alcohol use: No     No family history on file.      Current Outpatient Medications   Medication Sig Dispense Refill    aspirin 81 MG EC tablet Take 81 mg by mouth daily.       gabapentin (NEURONTIN) 300 MG capsule TAKE 2 CAPSULES BY MOUTH THREE TIMES DAILY. GENERIC EQUIVALENT FOR NEURONTIN 540 capsule 3    hydrochlorothiazide (HYDRODIURIL) 25 MG tablet TAKE 1 TABLET BY MOUTH DAILY 90 tablet 2    MULTIVITAMIN (MULTIPLE VITAMIN ORAL) [MULTIVITAMIN (MULTIPLE VITAMIN ORAL)] Take 1 tablet by mouth daily.      nortriptyline (PAMELOR) 10 MG capsule Take 1-2 capsules (10-20 mg) by mouth at bedtime. 180 capsule 1    omeprazole (PRILOSEC) 40 MG DR capsule Take 40 mg by mouth daily       No Known Allergies  Recent Labs   Lab Test 11/18/24  1500 10/27/23  0920 09/30/22  1345 05/02/22  1152 04/26/22  1600 11/09/20  1153 09/08/20  1038 09/04/19  0916   *  --   --  100  --   --  83 103   HDL 50  --   --  59  --   --  48* 54   TRIG 242*  --   --  131  --   --  173* 169*   ALT 20 20  --   --   --   --  21 22   CR 0.76 0.90   < >  --  0.83   < > 0.80 0.90   GFRESTIMATED 77 64   < >  --  70   < > >60 >60   GFRESTBLACK  --   --   --   --   --   --  >60 >60   POTASSIUM 3.8 3.7   < >  --  3.9   < > 3.9 4.1   TSH  --   --   --   --  3.14  --   --  3.13    < > = values in this interval not displayed.      Current providers sharing in care for this patient include:  Patient Care Team:  Emmanuelle Woodall MD as PCP - General (Family Medicine)  Emmanuelle Woodall MD as Assigned PCP  Jose Miguel Rodriguez MD as MD (Otolaryngology)  Lucas Lopez MD as Assigned Pulmonology Provider  Juana Koo MD as MD (Allergy & Immunology)  Manas Michelle DO as Physician (Gastroenterology)  Jose Miguel Rodriguez MD as Assigned Surgical Provider  Juana Koo MD as Assigned Allergy Provider    The following health maintenance items are reviewed in Epic and correct as of today:  Health Maintenance   Topic Date Due    RSV VACCINE (1 - 1-dose 75+ series) Never done    ZOSTER IMMUNIZATION (3 of 3) 11/03/2020    INFLUENZA VACCINE (1) 09/01/2024    COVID-19 Vaccine (5 - 2024-25 season) 09/01/2024     "BMP  10/27/2024    MEDICARE ANNUAL WELLNESS VISIT  10/27/2024    ANNUAL REVIEW OF HM ORDERS  05/06/2025    FALL RISK ASSESSMENT  11/18/2025    ADVANCE CARE PLANNING  11/18/2029    DTAP/TDAP/TD IMMUNIZATION (4 - Td or Tdap) 05/02/2032    DEXA  09/30/2035    PHQ-2 (once per calendar year)  Completed    Pneumococcal Vaccine: 65+ Years  Completed    HPV IMMUNIZATION  Aged Out    MENINGITIS IMMUNIZATION  Aged Out    RSV MONOCLONAL ANTIBODY  Aged Out       Review of Systems   Constitutional:  Positive for activity change (slowing down). Negative for chills and fever.   HENT:  Negative for ear pain and sore throat.         Dry mouth   Eyes:  Negative for pain and visual disturbance.   Respiratory:  Positive for cough (chronic cough, improved recently). Negative for shortness of breath.    Cardiovascular:  Negative for chest pain and palpitations.   Gastrointestinal:  Negative for abdominal pain and vomiting.        Fecal incontinence   Genitourinary:  Negative for dysuria and hematuria.   Musculoskeletal:  Negative for arthralgias and back pain.   Skin:  Negative for color change and rash.   Neurological:  Negative for seizures and syncope.        Restless legs   All other systems reviewed and are negative.           Objective    Exam  BP (!) 143/86 (BP Location: Right arm, Patient Position: Sitting, Cuff Size: Adult Regular)   Pulse 95   Temp 97.6  F (36.4  C) (Temporal)   Resp 18   Ht 1.6 m (5' 2.99\")   Wt 64.9 kg (143 lb)   LMP  (LMP Unknown)   SpO2 98%   BMI 25.34 kg/m     Estimated body mass index is 25.34 kg/m  as calculated from the following:    Height as of this encounter: 1.6 m (5' 2.99\").    Weight as of this encounter: 64.9 kg (143 lb).    Physical Exam  Vitals reviewed.   Constitutional:       General: She is not in acute distress.     Appearance: Normal appearance.   HENT:      Head: Normocephalic.      Right Ear: Tympanic membrane, ear canal and external ear normal.      Left Ear: Tympanic membrane, " ear canal and external ear normal.      Nose: Nose normal.      Mouth/Throat:      Mouth: Mucous membranes are dry.      Pharynx: No posterior oropharyngeal erythema.   Eyes:      Extraocular Movements: Extraocular movements intact.      Conjunctiva/sclera: Conjunctivae normal.      Pupils: Pupils are equal, round, and reactive to light.   Cardiovascular:      Rate and Rhythm: Normal rate and regular rhythm.      Pulses: Normal pulses.      Heart sounds: Normal heart sounds. No murmur heard.  Pulmonary:      Effort: Pulmonary effort is normal.      Breath sounds: Normal breath sounds.   Abdominal:      Palpations: Abdomen is soft. There is no mass.      Tenderness: There is no abdominal tenderness. There is no guarding or rebound.   Musculoskeletal:         General: No deformity. Normal range of motion.      Cervical back: Normal range of motion and neck supple.   Lymphadenopathy:      Cervical: No cervical adenopathy.   Skin:     General: Skin is warm and dry.   Neurological:      General: No focal deficit present.      Mental Status: She is alert and oriented to person, place, and time.      Comments: Mild memory deficit     Psychiatric:         Behavior: Behavior normal.      Comments: anxious       Results for orders placed or performed in visit on 11/18/24   Lipid Profile (Chol, Trig, HDL, LDL calc)     Status: Abnormal   Result Value Ref Range    Cholesterol 206 (H) <200 mg/dL    Triglycerides 242 (H) <150 mg/dL    Direct Measure HDL 50 >=50 mg/dL    LDL Cholesterol Calculated 108 (H) <100 mg/dL    Non HDL Cholesterol 156 (H) <130 mg/dL    Patient Fasting > 8hrs? Yes     Narrative    Cholesterol  Desirable: < 200 mg/dL  Borderline High: 200 - 239 mg/dL  High: >= 240 mg/dL    Triglycerides  Normal: < 150 mg/dL  Borderline High: 150 - 199 mg/dL  High: 200-499 mg/dL  Very High: >= 500 mg/dL    Direct Measure HDL  Female: >= 50 mg/dL   Male: >= 40 mg/dL    LDL Cholesterol  Desirable: < 100 mg/dL  Above Desirable:  100 - 129 mg/dL   Borderline High: 130 - 159 mg/dL   High:  160 - 189 mg/dL   Very High: >= 190 mg/dL    Non HDL Cholesterol  Desirable: < 130 mg/dL  Above Desirable: 130 - 159 mg/dL  Borderline High: 160 - 189 mg/dL  High: 190 - 219 mg/dL  Very High: >= 220 mg/dL   Comprehensive metabolic panel (BMP + Alb, Alk Phos, ALT, AST, Total. Bili, TP)     Status: None   Result Value Ref Range    Sodium 140 135 - 145 mmol/L    Potassium 3.8 3.4 - 5.3 mmol/L    Carbon Dioxide (CO2) 27 22 - 29 mmol/L    Anion Gap 14 7 - 15 mmol/L    Urea Nitrogen 8.8 8.0 - 23.0 mg/dL    Creatinine 0.76 0.51 - 0.95 mg/dL    GFR Estimate 77 >60 mL/min/1.73m2    Calcium 9.9 8.8 - 10.4 mg/dL    Chloride 99 98 - 107 mmol/L    Glucose 97 70 - 99 mg/dL    Alkaline Phosphatase 62 40 - 150 U/L    AST 33 0 - 45 U/L    ALT 20 0 - 50 U/L    Protein Total 7.9 6.4 - 8.3 g/dL    Albumin 4.4 3.5 - 5.2 g/dL    Bilirubin Total 0.4 <=1.2 mg/dL    Patient Fasting > 8hrs? Yes    CBC with platelets     Status: Normal   Result Value Ref Range    WBC Count 6.4 4.0 - 11.0 10e3/uL    RBC Count 4.60 3.80 - 5.20 10e6/uL    Hemoglobin 14.1 11.7 - 15.7 g/dL    Hematocrit 42.2 35.0 - 47.0 %    MCV 92 78 - 100 fL    MCH 30.7 26.5 - 33.0 pg    MCHC 33.4 31.5 - 36.5 g/dL    RDW 13.0 10.0 - 15.0 %    Platelet Count 321 150 - 450 10e3/uL             11/18/2024   Mini Cog   Clock Draw Score 2 Normal   3 Item Recall 2 objects recalled   Mini Cog Total Score 4                 Signed Electronically by: DANICA LARA MD

## 2024-11-19 LAB
ALBUMIN SERPL BCG-MCNC: 4.4 G/DL (ref 3.5–5.2)
ALP SERPL-CCNC: 62 U/L (ref 40–150)
ALT SERPL W P-5'-P-CCNC: 20 U/L (ref 0–50)
ANION GAP SERPL CALCULATED.3IONS-SCNC: 14 MMOL/L (ref 7–15)
AST SERPL W P-5'-P-CCNC: 33 U/L (ref 0–45)
BILIRUB SERPL-MCNC: 0.4 MG/DL
BUN SERPL-MCNC: 8.8 MG/DL (ref 8–23)
CALCIUM SERPL-MCNC: 9.9 MG/DL (ref 8.8–10.4)
CHLORIDE SERPL-SCNC: 99 MMOL/L (ref 98–107)
CHOLEST SERPL-MCNC: 206 MG/DL
CREAT SERPL-MCNC: 0.76 MG/DL (ref 0.51–0.95)
EGFRCR SERPLBLD CKD-EPI 2021: 77 ML/MIN/1.73M2
FASTING STATUS PATIENT QL REPORTED: YES
FASTING STATUS PATIENT QL REPORTED: YES
GLUCOSE SERPL-MCNC: 97 MG/DL (ref 70–99)
HCO3 SERPL-SCNC: 27 MMOL/L (ref 22–29)
HDLC SERPL-MCNC: 50 MG/DL
LDLC SERPL CALC-MCNC: 108 MG/DL
NONHDLC SERPL-MCNC: 156 MG/DL
POTASSIUM SERPL-SCNC: 3.8 MMOL/L (ref 3.4–5.3)
PROT SERPL-MCNC: 7.9 G/DL (ref 6.4–8.3)
SODIUM SERPL-SCNC: 140 MMOL/L (ref 135–145)
TRIGL SERPL-MCNC: 242 MG/DL

## 2024-11-25 ASSESSMENT — ENCOUNTER SYMPTOMS
COLOR CHANGE: 0
HEMATURIA: 0
ROS GI COMMENTS: FECAL INCONTINENCE
DYSURIA: 0
ARTHRALGIAS: 0
CHILLS: 0
FEVER: 0
ACTIVITY CHANGE: 1
PALPITATIONS: 0
VOMITING: 0
SORE THROAT: 0
EYE PAIN: 0
BACK PAIN: 0
SEIZURES: 0
COUGH: 1
ABDOMINAL PAIN: 0
SHORTNESS OF BREATH: 0

## 2025-01-13 DIAGNOSIS — I10 ESSENTIAL HYPERTENSION: ICD-10-CM

## 2025-01-15 RX ORDER — HYDROCHLOROTHIAZIDE 25 MG/1
25 TABLET ORAL DAILY
Qty: 90 TABLET | Refills: 2 | Status: SHIPPED | OUTPATIENT
Start: 2025-01-15

## 2025-02-11 ENCOUNTER — TELEPHONE (OUTPATIENT)
Dept: FAMILY MEDICINE | Facility: CLINIC | Age: 85
End: 2025-02-11
Payer: COMMERCIAL

## 2025-02-11 NOTE — TELEPHONE ENCOUNTER
"Caridad, from [employer] [facility] called to verified current Nortriptyline dose. Per Caridad, pt have multiple strength (10mg, 25mg, 75mg). RN advised per current medication list, Nortiptyline 10mg is the only active strength on file. Dose history showed the 25 mg was d/c'ed on 11/18/24 for dose adjustment, and 75mg was d/c'ed by \"another health care provider on 9/17/24\".    Radha PATRICIA RN  [facility]    "

## 2025-02-17 ENCOUNTER — OFFICE VISIT (OUTPATIENT)
Dept: FAMILY MEDICINE | Facility: CLINIC | Age: 85
End: 2025-02-17
Payer: COMMERCIAL

## 2025-02-17 VITALS
BODY MASS INDEX: 26.5 KG/M2 | HEART RATE: 103 BPM | TEMPERATURE: 97.5 F | WEIGHT: 144 LBS | SYSTOLIC BLOOD PRESSURE: 143 MMHG | DIASTOLIC BLOOD PRESSURE: 83 MMHG | OXYGEN SATURATION: 97 % | HEIGHT: 62 IN

## 2025-02-17 DIAGNOSIS — M79.661 RIGHT CALF PAIN: ICD-10-CM

## 2025-02-17 DIAGNOSIS — M25.511 ACUTE PAIN OF RIGHT SHOULDER: Primary | ICD-10-CM

## 2025-02-17 DIAGNOSIS — Z82.49 FAMILY HISTORY OF ISCHEMIC HEART DISEASE: ICD-10-CM

## 2025-02-17 PROCEDURE — G2211 COMPLEX E/M VISIT ADD ON: HCPCS | Performed by: FAMILY MEDICINE

## 2025-02-17 PROCEDURE — 99213 OFFICE O/P EST LOW 20 MIN: CPT | Performed by: FAMILY MEDICINE

## 2025-02-17 ASSESSMENT — PATIENT HEALTH QUESTIONNAIRE - PHQ9
SUM OF ALL RESPONSES TO PHQ QUESTIONS 1-9: 9
SUM OF ALL RESPONSES TO PHQ QUESTIONS 1-9: 9

## 2025-02-17 NOTE — PROGRESS NOTES
"  1. Acute pain of right shoulder (Primary)  Patient complains of pain in right shoudler with inability to fully lift arm at shoulder; decreased ROM - including int/ext rotation as well as abduction.  No clear injury.  Will refer to Ortho for further evaluation.  Patient is quite clear about where she wants to go for care.    - Orthopedic  Referral; Future    2. Right calf pain  Patient describes a brief shooting pain in muscle of lateral right calf.  She is sure this was a blood clot and gives me a list of family members with heart disease.  She has one brother who was said to have  of a blood clot - she has no information beyond that.  She has multiple other brother - many with heart disease.    Reassured her that what she describes would be very unlikely to represent a blood clot (mostly because she has fleeting pain in the calf and no recurrence - pain sounds much more like a muscle cramp - discussed staying hydrated).      3. Family history of ischemic heart disease  As above - patient relates an entire list of family members (all of her brothers) with heart disease.  She has given me this list in past and we referred her for calcium coronary artery scoring.  There was no concern at that time.  Reassured.      The longitudinal plan of care for the diagnosis(es)/condition(s) as documented were addressed during this visit. Due to the added complexity in care, I will continue to support Deborah in the subsequent management and with ongoing continuity of care.      Rosa Isela Cabrera is a 85 year old, presenting for the following health issues:  Shoulder Pain (On rt)        2025    11:22 AM   Additional Questions   Roomed by M   Accompanied by      \"Really bad\" right shoulder and can't lift it up at all  Getting worse   No injury    One morning - got up and had terrible pain in lateral right calf   Has family history of blood clots - was really scared -   No swelling -       History of " "Present Illness       Reason for visit:  Pain shoulder and leg  Symptom onset:  1-2 weeks ago  Symptoms include:  Pain  Symptom intensity:  Severe  Symptom progression:  Worsening  Had these symptoms before:  No  What makes it worse:  Moving my arm and shoulder   She is taking medications regularly.       Review of Systems   Constitutional:  Negative for chills and fever.        Dry mouth     HENT:  Negative for ear pain and sore throat.    Eyes:  Negative for pain and visual disturbance.   Respiratory:  Negative for cough and shortness of breath.    Cardiovascular:  Negative for chest pain and palpitations.   Gastrointestinal:  Negative for abdominal pain and vomiting.   Genitourinary:  Negative for dysuria and hematuria.   Musculoskeletal:  Negative for arthralgias and back pain.        Shoulder pain - decreased ROM, increased pain  Fleeting pain in right calf (one time)   Skin:  Negative for color change and rash.   Neurological:  Negative for seizures and syncope.   Psychiatric/Behavioral:  Positive for sleep disturbance.    All other systems reviewed and are negative.          Objective    BP (!) 143/83 (BP Location: Left arm, Patient Position: Sitting, Cuff Size: Adult Regular)   Pulse 103   Temp 97.5  F (36.4  C) (Temporal)   Ht 1.575 m (5' 2\")   Wt 65.3 kg (144 lb)   LMP  (LMP Unknown)   SpO2 97%   BMI 26.34 kg/m    Body mass index is 26.34 kg/m .  Physical Exam  Vitals reviewed.   Constitutional:       General: She is not in acute distress.     Appearance: Normal appearance.   HENT:      Head: Normocephalic.      Right Ear: Tympanic membrane, ear canal and external ear normal.      Left Ear: Tympanic membrane, ear canal and external ear normal.      Nose: Nose normal.      Mouth/Throat:      Mouth: Mucous membranes are moist.      Pharynx: No posterior oropharyngeal erythema.   Eyes:      Extraocular Movements: Extraocular movements intact.      Conjunctiva/sclera: Conjunctivae normal.      Pupils: " Pupils are equal, round, and reactive to light.   Cardiovascular:      Rate and Rhythm: Normal rate and regular rhythm.      Pulses: Normal pulses.      Heart sounds: Normal heart sounds. No murmur heard.  Pulmonary:      Effort: Pulmonary effort is normal.      Breath sounds: Normal breath sounds.   Abdominal:      Palpations: Abdomen is soft. There is no mass.      Tenderness: There is no abdominal tenderness. There is no guarding or rebound.   Musculoskeletal:         General: No deformity. Normal range of motion.      Cervical back: Normal range of motion and neck supple.      Comments: No calf tenderness - negative exam     Lymphadenopathy:      Cervical: No cervical adenopathy.   Skin:     General: Skin is warm and dry.   Neurological:      General: No focal deficit present.      Mental Status: She is alert.      Comments: Mild cognitive decline   Psychiatric:         Mood and Affect: Mood normal.         Behavior: Behavior normal.            No results found for any visits on 02/17/25.        Signed Electronically by: DANICA LARA MD

## 2025-02-17 NOTE — PROGRESS NOTES
Prior to immunization administration, verified patients identity using patient s name and date of birth. Please see Immunization Activity for additional information.     Screening Questionnaire for Adult Immunization    Are you sick today?   No   Do you have allergies to medications, food, a vaccine component or latex?   No   Have you ever had a serious reaction after receiving a vaccination?   No   Do you have a long-term health problem with heart, lung, kidney, or metabolic disease (e.g., diabetes), asthma, a blood disorder, no spleen, complement component deficiency, a cochlear implant, or a spinal fluid leak?  Are you on long-term aspirin therapy?   No   Do you have cancer, leukemia, HIV/AIDS, or any other immune system problem?   No   Do you have a parent, brother, or sister with an immune system problem?   No   In the past 3 months, have you taken medications that affect  your immune system, such as prednisone, other steroids, or anticancer drugs; drugs for the treatment of rheumatoid arthritis, Crohn s disease, or psoriasis; or have you had radiation treatments?   No   Have you had a seizure, or a brain or other nervous system problem?   No   During the past year, have you received a transfusion of blood or blood    products, or been given immune (gamma) globulin or antiviral drug?   No   For women: Are you pregnant or is there a chance you could become       pregnant during the next month?   No   Have you received any vaccinations in the past 4 weeks?   No     Immunization questionnaire answers were all negative.      Patient instructed to remain in clinic for 15 minutes afterwards, and to report any adverse reactions.     Screening performed by JENNIFER George MA on 2/17/2025 at 11:25 AM.   Answers submitted by the patient for this visit:  Patient Health Questionnaire (Submitted on 2/17/2025)  PHQ9 TOTAL SCORE: 9  General Questionnaire (Submitted on 2/17/2025)  Chief Complaint: Chronic problems general questions  HPI Form  How many days per week do you miss taking your medication?: 0  General Concern (Submitted on 2/17/2025)  Chief Complaint: Chronic problems general questions HPI Form  What is the reason for your visit today?: pain shoulder and leg  When did your symptoms begin?: 1-2 weeks ago  What are your symptoms?: pain  How would you describe these symptoms?: Severe  Are your symptoms:: Worsening  Have you had these symptoms before?: No  Is there anything that makes you feel worse?: moving my arm and shoulder  Questionnaire about: Chronic problems general questions HPI Form (Submitted on 2/17/2025)  Chief Complaint: Chronic problems general questions HPI Form

## 2025-02-18 ENCOUNTER — PATIENT OUTREACH (OUTPATIENT)
Dept: CARE COORDINATION | Facility: CLINIC | Age: 85
End: 2025-02-18
Payer: COMMERCIAL

## 2025-02-20 ENCOUNTER — PATIENT OUTREACH (OUTPATIENT)
Dept: CARE COORDINATION | Facility: CLINIC | Age: 85
End: 2025-02-20
Payer: COMMERCIAL

## 2025-02-23 ASSESSMENT — ENCOUNTER SYMPTOMS
SHORTNESS OF BREATH: 0
COUGH: 0
SEIZURES: 0
EYE PAIN: 0
DYSURIA: 0
SORE THROAT: 0
CHILLS: 0
ABDOMINAL PAIN: 0
SLEEP DISTURBANCE: 1
HEMATURIA: 0
ARTHRALGIAS: 0
FEVER: 0
COLOR CHANGE: 0
BACK PAIN: 0
VOMITING: 0
PALPITATIONS: 0

## 2025-03-02 DIAGNOSIS — G25.81 RESTLESS LEG SYNDROME: ICD-10-CM

## 2025-03-02 RX ORDER — GABAPENTIN 300 MG/1
CAPSULE ORAL
Qty: 540 CAPSULE | Refills: 3 | Status: SHIPPED | OUTPATIENT
Start: 2025-03-02

## 2025-03-26 ENCOUNTER — OFFICE VISIT (OUTPATIENT)
Dept: FAMILY MEDICINE | Facility: CLINIC | Age: 85
End: 2025-03-26
Payer: COMMERCIAL

## 2025-03-26 VITALS
WEIGHT: 143 LBS | HEIGHT: 63 IN | TEMPERATURE: 97.3 F | RESPIRATION RATE: 16 BRPM | DIASTOLIC BLOOD PRESSURE: 81 MMHG | SYSTOLIC BLOOD PRESSURE: 147 MMHG | BODY MASS INDEX: 25.34 KG/M2 | OXYGEN SATURATION: 99 % | HEART RATE: 77 BPM

## 2025-03-26 DIAGNOSIS — M19.032 PRIMARY OSTEOARTHRITIS OF LEFT WRIST: Primary | ICD-10-CM

## 2025-03-26 PROBLEM — K22.2 OBSTRUCTION OF ESOPHAGUS: Status: ACTIVE | Noted: 2024-10-21

## 2025-03-26 PROBLEM — K44.9 DIAPHRAGMATIC HERNIA: Status: ACTIVE | Noted: 2024-10-09

## 2025-03-26 LAB
CRP SERPL-MCNC: 6.5 MG/L
URATE SERPL-MCNC: 5.4 MG/DL (ref 2.4–5.7)

## 2025-03-26 PROCEDURE — 84550 ASSAY OF BLOOD/URIC ACID: CPT | Performed by: FAMILY MEDICINE

## 2025-03-26 PROCEDURE — 3077F SYST BP >= 140 MM HG: CPT | Performed by: FAMILY MEDICINE

## 2025-03-26 PROCEDURE — 36415 COLL VENOUS BLD VENIPUNCTURE: CPT | Performed by: FAMILY MEDICINE

## 2025-03-26 PROCEDURE — 3078F DIAST BP <80 MM HG: CPT | Performed by: FAMILY MEDICINE

## 2025-03-26 PROCEDURE — 99213 OFFICE O/P EST LOW 20 MIN: CPT | Performed by: FAMILY MEDICINE

## 2025-03-26 PROCEDURE — 86140 C-REACTIVE PROTEIN: CPT | Performed by: FAMILY MEDICINE

## 2025-03-26 RX ORDER — PREDNISONE 10 MG/1
TABLET ORAL
Qty: 7 TABLET | Refills: 0 | Status: SHIPPED | OUTPATIENT
Start: 2025-03-26

## 2025-03-26 NOTE — PROGRESS NOTES
"  {PROVIDER CHARTING PREFERENCE:446294}    Rosa Isela Cabrera is a 85 year old, presenting for the following health issues:  RECHECK (Follow up for fall)      3/26/2025     9:44 AM   Additional Questions   Roomed by Debbie   Accompanied by      Left wrist - pain began acutely   Got really red     Went in for right shoulder pain -   Seen by Hamzah    Left wrist - acute pain -   Xrays were negative - \"old age and arthritis\"  Put her on prednisone for 5 days   Taking last pill today   Got really sore and red -  Gave her prednisone and wrist brace     Wrist pain began Monday - seen Saturday -   Now on last day of Prednisone today                 {MA/LPN/RN Pre-Provider Visit Orders- hCG/UA/Strep (Optional):236740}  {SUPERLIST (Optional):576572}  {additonal problems for provider to add (Optional):171050}    {ROS Picklists (Optional):361630}      Objective    BP (!) 147/81   Pulse 77   Temp 97.3  F (36.3  C) (Temporal)   Resp 16   Ht 1.61 m (5' 3.39\")   Wt 64.9 kg (143 lb)   LMP  (LMP Unknown)   SpO2 99%   BMI 25.02 kg/m    Body mass index is 25.02 kg/m .  Physical Exam   {Exam List (Optional):598955}    {Diagnostic Test Results (Optional):622784}        Signed Electronically by: DANICA LARA MD  {Email feedback regarding this note to [email or web address]   :875729}  " "Negative for seizures and syncope.   All other systems reviewed and are negative.          Objective    BP (!) 147/81   Pulse 77   Temp 97.3  F (36.3  C) (Temporal)   Resp 16   Ht 1.61 m (5' 3.39\")   Wt 64.9 kg (143 lb)   LMP  (LMP Unknown)   SpO2 99%   BMI 25.02 kg/m    Body mass index is 25.02 kg/m .  Physical Exam  Vitals reviewed.   Constitutional:       General: She is not in acute distress.     Appearance: Normal appearance.   HENT:      Head: Normocephalic.      Right Ear: Tympanic membrane, ear canal and external ear normal.      Left Ear: Tympanic membrane, ear canal and external ear normal.      Nose: Nose normal.      Mouth/Throat:      Mouth: Mucous membranes are moist.      Pharynx: No posterior oropharyngeal erythema.   Eyes:      Extraocular Movements: Extraocular movements intact.      Conjunctiva/sclera: Conjunctivae normal.      Pupils: Pupils are equal, round, and reactive to light.   Cardiovascular:      Rate and Rhythm: Normal rate and regular rhythm.      Pulses: Normal pulses.      Heart sounds: Normal heart sounds. No murmur heard.  Pulmonary:      Effort: Pulmonary effort is normal.      Breath sounds: Normal breath sounds.   Abdominal:      Palpations: Abdomen is soft. There is no mass.      Tenderness: There is no abdominal tenderness. There is no guarding or rebound.   Musculoskeletal:         General: Tenderness (left wrist - mild erythema) present. No deformity. Normal range of motion.      Cervical back: Normal range of motion and neck supple.   Lymphadenopathy:      Cervical: No cervical adenopathy.   Skin:     General: Skin is warm and dry.   Neurological:      General: No focal deficit present.      Mental Status: She is alert.   Psychiatric:         Mood and Affect: Mood normal.         Behavior: Behavior normal.            Results for orders placed or performed in visit on 03/26/25   Uric acid     Status: Normal   Result Value Ref Range    Uric Acid 5.4 2.4 - 5.7 mg/dL "   CRP, inflammation     Status: Abnormal   Result Value Ref Range    CRP Inflammation 6.50 (H) <5.00 mg/L           Signed Electronically by: DANICA LARA MD

## 2025-03-26 NOTE — PROGRESS NOTES
"Prior to immunization administration, verified patients identity using patient s name and date of birth. Please see Immunization Activity for additional information.     Screening Questionnaire for Adult Immunization    Are you sick today?   No   Do you have allergies to medications, food, a vaccine component or latex?   No   Have you ever had a serious reaction after receiving a vaccination?   No   Do you have a long-term health problem with heart, lung, kidney, or metabolic disease (e.g., diabetes), asthma, a blood disorder, no spleen, complement component deficiency, a cochlear implant, or a spinal fluid leak?  Are you on long-term aspirin therapy?   No   Do you have cancer, leukemia, HIV/AIDS, or any other immune system problem?   No   Do you have a parent, brother, or sister with an immune system problem?   No   In the past 3 months, have you taken medications that affect  your immune system, such as prednisone, other steroids, or anticancer drugs; drugs for the treatment of rheumatoid arthritis, Crohn s disease, or psoriasis; or have you had radiation treatments?   No   Have you had a seizure, or a brain or other nervous system problem?   No   During the past year, have you received a transfusion of blood or blood    products, or been given immune (gamma) globulin or antiviral drug?   No   For women: Are you pregnant or is there a chance you could become       pregnant during the next month?   No   Have you received any vaccinations in the past 4 weeks?   No     Immunization questionnaire { :047862::\"answers were all negative.\"}      Patient instructed to remain in clinic for 15 minutes afterwards, and to report any adverse reactions.     Screening performed by Debbie Kline MA on 3/26/2025 at 9:49 AM.   "

## 2025-04-06 ASSESSMENT — ENCOUNTER SYMPTOMS
COLOR CHANGE: 0
SHORTNESS OF BREATH: 0
SORE THROAT: 0
EYE PAIN: 0
ABDOMINAL PAIN: 0
SEIZURES: 0
ARTHRALGIAS: 1
CHILLS: 0
PALPITATIONS: 0
VOMITING: 0
COUGH: 0
DYSURIA: 0
HEMATURIA: 0
FEVER: 0
BACK PAIN: 0

## 2025-04-28 ENCOUNTER — OFFICE VISIT (OUTPATIENT)
Dept: OTOLARYNGOLOGY | Facility: CLINIC | Age: 85
End: 2025-04-28
Payer: COMMERCIAL

## 2025-04-28 DIAGNOSIS — H91.93 HEARING DIFFICULTY OF BOTH EARS: ICD-10-CM

## 2025-04-28 DIAGNOSIS — H61.23 BILATERAL IMPACTED CERUMEN: Primary | ICD-10-CM

## 2025-04-28 PROCEDURE — 69210 REMOVE IMPACTED EAR WAX UNI: CPT | Performed by: OTOLARYNGOLOGY

## 2025-04-28 NOTE — LETTER
4/28/2025      Deborah Healymagdalene  [address]  [place] [id number]      Dear Colleague,    Thank you for referring your patient, Deborah Mireles, to the [facility]. Please see a copy of my visit note below.        ENT FOLLOW UP VISIT NOTE    Patient presents with:  Cerumen Impaction:  6 mo follow up ear cleaning/difficulty hearing,       HISTORY OF PRESENT ILLNESS    Deborah was seen in follow up for ear cleaning. Hearing is down in both ears.  No ear pain or discharge.           ALLERGIES    Patient has no known allergies.    CURRENT MEDICATIONS      Current Outpatient Medications:      aspirin 81 MG EC tablet, Take 81 mg by mouth daily., Disp: , Rfl:      gabapentin (NEURONTIN) 300 MG capsule, TAKE 2 CAPSULES BY MOUTH THREE TIMES DAILY. GENERIC EQUIVALENT FOR NEURONTIN, Disp: 540 capsule, Rfl: 3     hydrochlorothiazide (HYDRODIURIL) 25 MG tablet, TAKE 1 TABLET BY MOUTH DAILY, Disp: 90 tablet, Rfl: 2     MULTIVITAMIN (MULTIPLE VITAMIN ORAL), [MULTIVITAMIN (MULTIPLE VITAMIN ORAL)] Take 1 tablet by mouth daily., Disp: , Rfl:      nortriptyline (PAMELOR) 10 MG capsule, Take 1-2 capsules (10-20 mg) by mouth at bedtime., Disp: 180 capsule, Rfl: 1     omeprazole (PRILOSEC) 40 MG DR capsule, Take 40 mg by mouth daily, Disp: , Rfl:      PAST MEDICAL HISTORY    PAST MEDICAL HISTORY:   Past Medical History:   Diagnosis Date     Atypical Chest Pain     Created by Conversion      Chronic cough 01/05/2015     Constipation      Coronary atherosclerosis      Diaphragmatic hernia 10/9/2024     Dizziness and giddiness      Epistaxis      Essential hypertension      Gastroesophageal reflux disease without esophagitis 02/18/2016     Hereditary and idiopathic peripheral neuropathy      Hypermagnesemia     Created by Conversion Replacement Utility updated for latest IMO load      Impacted cerumen      Osteoarthrosis involving lower leg      Osteoporosis      Pain in joint, lower leg      Pseudotumor  (inflammatory) of orbit, 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     Restless legs syndrome (RLS)      Seborrheic dermatitis, unspecified      Spinal stenosis, unspecified region other than cervical        PAST SURGICAL HISTORY    PAST SURGICAL HISTORY:   Past Surgical History:   Procedure Laterality Date     ABDOMEN SURGERY       APPENDECTOMY       BACK SURGERY       CHOLECYSTECTOMY       COSMETIC SURGERY      blepharoplasty     ESOPHAGOSCOPY, GASTROSCOPY, DUODENOSCOPY (EGD), COMBINED N/A 6/27/2023    Procedure: ESOPHAGOGASTRODUODENOSCOPY, WITH BIOPSY;  Surgeon: Sabas Georges DO;  Location: [facility]     EYE SURGERY       FRACTURE SURGERY       HC DILATION/CURETTAGE DIAG/THER NON OB      Description: Dilation And Curettage;  Recorded: 02/02/2010;  Comments: due to miscarriage     HC KNEE SCOPE, DIAGNOSTIC      Description: Arthroscopy Knee Right;  Recorded: 02/02/2010;  Comments: right knee - arthroscopy for debridement; then Synvisc     LUMBAR FUSION  08/01/2013       FAMILY  HISTORY    FAMILY HISTORY: No family history on file.    SOCIAL HISTORY    SOCIAL HISTORY:   Social History     Tobacco Use     Smoking status: Never     Passive exposure: Never     Smokeless tobacco: Never   Substance Use Topics     Alcohol use: No        PHYSICAL EXAM    HEAD: Normal appearance and symmetry:  No cutaneous lesions.      NECK:  supple     EARS:  Auricles normal without lesions     CERUMEN IMPACTION REMOVAL    Findings:  on both sides Cerumen Impaction(s)    After obtaining verbal consent, and using the binocular microscope.  Cerumen impaction(s) were removed from the affected ear canal(s)  using a wire loops and/or suction.  The patient tolerated the procedure well without incident.      EYES:  EOMI    CN VII/XII:  intact     NOSE:  patent        ORAL CAVITY/OROPHARYNX:     Lips:  Normal.  Tongue: normal, midline  Mucosa:   no lesions     NECK:  Trachea:  midline.        NEURO:   Alert and Oriented        RESPIRATORY:   Symmetry and  Respiratory effort     PSYCH:  Normal mood and affect     SKIN:   warm and dry         IMPRESSION:    Encounter Diagnoses   Name Primary?     Bilateral impacted cerumen Yes     Hearing difficulty of both ears           RECOMMENDATIONS:      Orders Placed This Encounter   Procedures     Remove Cerumen     Adult Audiology  Referral      Return visit 6 months for cleaning      Again, thank you for allowing me to participate in the care of your patient.        Sincerely,        Jose Miguel Rodriguez MD    Electronically signed

## 2025-04-28 NOTE — PROGRESS NOTES
ENT FOLLOW UP VISIT NOTE    Patient presents with:  Cerumen Impaction:  6 mo follow up ear cleaning/difficulty hearing,       HISTORY OF PRESENT ILLNESS    Deborah was seen in follow up for ear cleaning. Hearing is down in both ears.  No ear pain or discharge.           ALLERGIES    Patient has no known allergies.    CURRENT MEDICATIONS      Current Outpatient Medications:     aspirin 81 MG EC tablet, Take 81 mg by mouth daily., Disp: , Rfl:     gabapentin (NEURONTIN) 300 MG capsule, TAKE 2 CAPSULES BY MOUTH THREE TIMES DAILY. GENERIC EQUIVALENT FOR NEURONTIN, Disp: 540 capsule, Rfl: 3    hydrochlorothiazide (HYDRODIURIL) 25 MG tablet, TAKE 1 TABLET BY MOUTH DAILY, Disp: 90 tablet, Rfl: 2    MULTIVITAMIN (MULTIPLE VITAMIN ORAL), [MULTIVITAMIN (MULTIPLE VITAMIN ORAL)] Take 1 tablet by mouth daily., Disp: , Rfl:     nortriptyline (PAMELOR) 10 MG capsule, Take 1-2 capsules (10-20 mg) by mouth at bedtime., Disp: 180 capsule, Rfl: 1    omeprazole (PRILOSEC) 40 MG DR capsule, Take 40 mg by mouth daily, Disp: , Rfl:      PAST MEDICAL HISTORY    PAST MEDICAL HISTORY:   Past Medical History:   Diagnosis Date    Atypical Chest Pain     Created by Conversion     Chronic cough 01/05/2015    Constipation     Coronary atherosclerosis     Diaphragmatic hernia 10/9/2024    Dizziness and giddiness     Epistaxis     Essential hypertension     Gastroesophageal reflux disease without esophagitis 02/18/2016    Hereditary and idiopathic peripheral neuropathy     Hypermagnesemia     Created by Conversion Replacement Utility updated for latest IMO load     Impacted cerumen     Osteoarthrosis involving lower leg     Osteoporosis     Pain in joint, lower leg     Pseudotumor (inflammatory) of orbit, 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    Restless legs syndrome (RLS)     Seborrheic dermatitis, unspecified     Spinal stenosis, unspecified region other than cervical        PAST SURGICAL HISTORY    PAST SURGICAL HISTORY:   Past Surgical History:   Procedure  Laterality Date    ABDOMEN SURGERY      APPENDECTOMY      BACK SURGERY      CHOLECYSTECTOMY      COSMETIC SURGERY      blepharoplasty    ESOPHAGOSCOPY, GASTROSCOPY, DUODENOSCOPY (EGD), COMBINED N/A 6/27/2023    Procedure: ESOPHAGOGASTRODUODENOSCOPY, WITH BIOPSY;  Surgeon: Sabas Georges DO;  Location: [facility] OR    EYE SURGERY      FRACTURE SURGERY      HC DILATION/CURETTAGE DIAG/THER NON OB      Description: Dilation And Curettage;  Recorded: 02/02/2010;  Comments: due to miscarriage    HC KNEE SCOPE, DIAGNOSTIC      Description: Arthroscopy Knee Right;  Recorded: 02/02/2010;  Comments: right knee - arthroscopy for debridement; then Synvisc    LUMBAR FUSION  08/01/2013       FAMILY  HISTORY    FAMILY HISTORY: No family history on file.    SOCIAL HISTORY    SOCIAL HISTORY:   Social History     Tobacco Use    Smoking status: Never     Passive exposure: Never    Smokeless tobacco: Never   Substance Use Topics    Alcohol use: No        PHYSICAL EXAM    HEAD: Normal appearance and symmetry:  No cutaneous lesions.      NECK:  supple     EARS:  Auricles normal without lesions     CERUMEN IMPACTION REMOVAL    Findings:  on both sides Cerumen Impaction(s)    After obtaining verbal consent, and using the binocular microscope.  Cerumen impaction(s) were removed from the affected ear canal(s)  using a wire loops and/or suction.  The patient tolerated the procedure well without incident.      EYES:  EOMI    CN VII/XII:  intact     NOSE:  patent        ORAL CAVITY/OROPHARYNX:     Lips:  Normal.  Tongue: normal, midline  Mucosa:   no lesions     NECK:  Trachea:  midline.        NEURO:   Alert and Oriented        RESPIRATORY:   Symmetry and Respiratory effort     PSYCH:  Normal mood and affect     SKIN:   warm and dry         IMPRESSION:    Encounter Diagnoses   Name Primary?    Bilateral impacted cerumen Yes    Hearing difficulty of both ears           RECOMMENDATIONS:      Orders Placed This Encounter   Procedures     Remove Cerumen    Adult Audiology  Referral      Return visit 6 months for cleaning

## 2025-04-30 ENCOUNTER — OFFICE VISIT (OUTPATIENT)
Dept: AUDIOLOGY | Facility: CLINIC | Age: 85
End: 2025-04-30
Payer: COMMERCIAL

## 2025-04-30 DIAGNOSIS — H90.3 SENSORINEURAL HEARING LOSS (SNHL) OF BOTH EARS: Primary | ICD-10-CM

## 2025-04-30 DIAGNOSIS — H91.93 HEARING DIFFICULTY OF BOTH EARS: ICD-10-CM

## 2025-04-30 PROCEDURE — 92557 COMPREHENSIVE HEARING TEST: CPT | Performed by: AUDIOLOGIST

## 2025-04-30 PROCEDURE — 92550 TYMPANOMETRY & REFLEX THRESH: CPT | Mod: 52 | Performed by: AUDIOLOGIST

## 2025-04-30 NOTE — PROGRESS NOTES
AUDIOLOGY REPORT    SUMMARY: Audiology visit completed. See audiogram for results.      RECOMMENDATIONS: Follow-up with ENT.    Alfonso Jimenez, CCC-A  [place] Licensed Audiologist #[id number]

## 2025-07-15 ENCOUNTER — OFFICE VISIT (OUTPATIENT)
Dept: FAMILY MEDICINE | Facility: CLINIC | Age: 85
End: 2025-07-15
Payer: COMMERCIAL

## 2025-07-15 ENCOUNTER — ANCILLARY PROCEDURE (OUTPATIENT)
Dept: GENERAL RADIOLOGY | Facility: CLINIC | Age: 85
End: 2025-07-15
Attending: FAMILY MEDICINE
Payer: COMMERCIAL

## 2025-07-15 VITALS
TEMPERATURE: 97.5 F | SYSTOLIC BLOOD PRESSURE: 130 MMHG | WEIGHT: 141 LBS | OXYGEN SATURATION: 97 % | HEIGHT: 63 IN | BODY MASS INDEX: 24.98 KG/M2 | HEART RATE: 88 BPM | DIASTOLIC BLOOD PRESSURE: 83 MMHG

## 2025-07-15 DIAGNOSIS — R10.817 GENERALIZED ABDOMINAL TENDERNESS WITHOUT REBOUND TENDERNESS: ICD-10-CM

## 2025-07-15 DIAGNOSIS — R73.9 ELEVATED RANDOM BLOOD GLUCOSE LEVEL: ICD-10-CM

## 2025-07-15 DIAGNOSIS — R19.7 DIARRHEA, UNSPECIFIED TYPE: Primary | ICD-10-CM

## 2025-07-15 DIAGNOSIS — R19.7 DIARRHEA, UNSPECIFIED TYPE: ICD-10-CM

## 2025-07-15 LAB
ALBUMIN SERPL BCG-MCNC: 4.5 G/DL (ref 3.5–5.2)
ALP SERPL-CCNC: 64 U/L (ref 40–150)
ALT SERPL W P-5'-P-CCNC: 20 U/L (ref 0–50)
ANION GAP SERPL CALCULATED.3IONS-SCNC: 12 MMOL/L (ref 7–15)
AST SERPL W P-5'-P-CCNC: 31 U/L (ref 0–45)
BASOPHILS # BLD AUTO: 0 10E3/UL (ref 0–0.2)
BASOPHILS NFR BLD AUTO: 1 %
BILIRUB SERPL-MCNC: 0.2 MG/DL
BUN SERPL-MCNC: 13.3 MG/DL (ref 8–23)
CALCIUM SERPL-MCNC: 10.2 MG/DL (ref 8.8–10.4)
CHLORIDE SERPL-SCNC: 98 MMOL/L (ref 98–107)
CREAT SERPL-MCNC: 0.73 MG/DL (ref 0.51–0.95)
EGFRCR SERPLBLD CKD-EPI 2021: 81 ML/MIN/1.73M2
EOSINOPHIL # BLD AUTO: 0.1 10E3/UL (ref 0–0.7)
EOSINOPHIL NFR BLD AUTO: 2 %
ERYTHROCYTE [DISTWIDTH] IN BLOOD BY AUTOMATED COUNT: 12.7 % (ref 10–15)
GLUCOSE SERPL-MCNC: 117 MG/DL (ref 70–99)
HCO3 SERPL-SCNC: 26 MMOL/L (ref 22–29)
HCT VFR BLD AUTO: 44 % (ref 35–47)
HGB BLD-MCNC: 14.5 G/DL (ref 11.7–15.7)
IMM GRANULOCYTES # BLD: 0 10E3/UL
IMM GRANULOCYTES NFR BLD: 0 %
LYMPHOCYTES # BLD AUTO: 2.1 10E3/UL (ref 0.8–5.3)
LYMPHOCYTES NFR BLD AUTO: 36 %
MCH RBC QN AUTO: 30.2 PG (ref 26.5–33)
MCHC RBC AUTO-ENTMCNC: 33 G/DL (ref 31.5–36.5)
MCV RBC AUTO: 92 FL (ref 78–100)
MONOCYTES # BLD AUTO: 0.7 10E3/UL (ref 0–1.3)
MONOCYTES NFR BLD AUTO: 11 %
NEUTROPHILS # BLD AUTO: 3 10E3/UL (ref 1.6–8.3)
NEUTROPHILS NFR BLD AUTO: 50 %
PLATELET # BLD AUTO: 309 10E3/UL (ref 150–450)
POTASSIUM SERPL-SCNC: 4.2 MMOL/L (ref 3.4–5.3)
PROT SERPL-MCNC: 8 G/DL (ref 6.4–8.3)
RBC # BLD AUTO: 4.8 10E6/UL (ref 3.8–5.2)
SODIUM SERPL-SCNC: 136 MMOL/L (ref 135–145)
TSH SERPL DL<=0.005 MIU/L-ACNC: 3.19 UIU/ML (ref 0.3–4.2)
WBC # BLD AUTO: 5.9 10E3/UL (ref 4–11)

## 2025-07-15 PROCEDURE — 85025 COMPLETE CBC W/AUTO DIFF WBC: CPT | Performed by: FAMILY MEDICINE

## 2025-07-15 PROCEDURE — 3075F SYST BP GE 130 - 139MM HG: CPT | Performed by: FAMILY MEDICINE

## 2025-07-15 PROCEDURE — 83036 HEMOGLOBIN GLYCOSYLATED A1C: CPT | Performed by: FAMILY MEDICINE

## 2025-07-15 PROCEDURE — 3079F DIAST BP 80-89 MM HG: CPT | Performed by: FAMILY MEDICINE

## 2025-07-15 PROCEDURE — 99214 OFFICE O/P EST MOD 30 MIN: CPT | Performed by: FAMILY MEDICINE

## 2025-07-15 PROCEDURE — 74019 RADEX ABDOMEN 2 VIEWS: CPT | Mod: TC | Performed by: RADIOLOGY

## 2025-07-15 PROCEDURE — G2211 COMPLEX E/M VISIT ADD ON: HCPCS | Performed by: FAMILY MEDICINE

## 2025-07-15 PROCEDURE — 84443 ASSAY THYROID STIM HORMONE: CPT | Performed by: FAMILY MEDICINE

## 2025-07-15 PROCEDURE — 36415 COLL VENOUS BLD VENIPUNCTURE: CPT | Performed by: FAMILY MEDICINE

## 2025-07-15 PROCEDURE — 80053 COMPREHEN METABOLIC PANEL: CPT | Performed by: FAMILY MEDICINE

## 2025-07-15 ASSESSMENT — ENCOUNTER SYMPTOMS: FATIGUE: 1

## 2025-07-15 NOTE — PATIENT INSTRUCTIONS
Start Metamucil daily - start low and go up to get regular stools  2.  Try Imodium as needed for diarrhea - or preventive when going out  3.  Could try a probiotic daily.

## 2025-07-15 NOTE — PROGRESS NOTES
"  {PROVIDER CHARTING PREFERENCE:875850}    Rosa Isela Cabrera is a 85 year old, presenting for the following health issues:  Abdominal Pain (Pt stated bowel issues been getting worst in the last few weeks ) and Fatigue (Not sleeping well)      7/15/2025     9:03 AM   Additional Questions   Roomed by Obey   Accompanied by antonia     \"Bathroom problems\"  Emergency ones  Had trouble after eating -     Doesn't see anything that particularly triggers it   Everything she ate - wrote it all down for a week - didn't have troubles with that     Just within last couple weeks, it got bad -   Happened 3-4 times in last couple weeks -   If not close to the bathroom,  is afraid she'll have incontinence    Feels like stomach hurts \"a lot\"  Sick to stomach a lot - more the gut  Feels pressure -   Gets urge , but can't get there fast enough   Like an explosion when it comes   Watery - has to flush the toilet several times   In between, ?normal stools    Doesn't take fiber -         Fatigue  Associated symptoms include fatigue.   History of Present Illness       Headaches:   Since the patient's last clinic visit, headaches are: no change  The patient is getting headaches:  Headache on and off  with no regular time  She is able to do normal daily activities when she has a migraine.  The patient is taking the following rescue/relief medications:  Tylenol   Patient states \"I get some relief\" from the rescue/relief medications.   The patient is taking the following medications to prevent migraines:  No medications to prevent migraines  In the past 4 weeks, the patient has gone to an Urgent Care or Emergency Room 0 times times due to headaches.    Reason for visit:  Intestinal problems  Symptoms include:  It is always an emergency -no symptoms=  Symptom progression:  Worsening  Had these symptoms before:  Yes  Has tried/received treatment for these symptoms:  No         {MA/LPN/RN Pre-Provider Visit Orders- hCG/UA/Strep " "(Optional):274167}  {SUPERLIST (Optional):498633}  {additonal problems for provider to add (Optional):500580}    {ROS Picklists (Optional):779459}      Objective    /83 (BP Location: Left arm, Patient Position: Sitting, Cuff Size: Adult Regular)   Pulse 88   Temp 97.5  F (36.4  C) (Temporal)   Ht 1.61 m (5' 3.39\")   Wt 64 kg (141 lb)   LMP  (LMP Unknown)   SpO2 97%   BMI 24.67 kg/m    Body mass index is 24.67 kg/m .  Physical Exam   {Exam List (Optional):254823}    {Diagnostic Test Results (Optional):433716}        Signed Electronically by: DANICA LARA MD  {Email feedback regarding this note to [email or web address]   :403156}  " "arm, Patient Position: Sitting, Cuff Size: Adult Regular)   Pulse 88   Temp 97.5  F (36.4  C) (Temporal)   Ht 1.61 m (5' 3.39\")   Wt 64 kg (141 lb)   LMP  (LMP Unknown)   SpO2 97%   BMI 24.67 kg/m    Body mass index is 24.67 kg/m .  Physical Exam  Vitals reviewed.   Constitutional:       General: She is not in acute distress.     Appearance: Normal appearance.   HENT:      Head: Normocephalic.      Right Ear: Tympanic membrane, ear canal and external ear normal.      Left Ear: Tympanic membrane, ear canal and external ear normal.      Nose: Nose normal.      Mouth/Throat:      Mouth: Mucous membranes are moist.      Pharynx: No posterior oropharyngeal erythema.   Eyes:      Extraocular Movements: Extraocular movements intact.      Conjunctiva/sclera: Conjunctivae normal.      Pupils: Pupils are equal, round, and reactive to light.   Cardiovascular:      Rate and Rhythm: Normal rate and regular rhythm.      Pulses: Normal pulses.      Heart sounds: Normal heart sounds. No murmur heard.  Pulmonary:      Effort: Pulmonary effort is normal.      Breath sounds: Normal breath sounds.   Abdominal:      Palpations: Abdomen is soft. There is no mass.      Tenderness: There is abdominal tenderness (mild tenderness abdomen). There is no guarding or rebound.   Musculoskeletal:         General: No deformity. Normal range of motion.      Cervical back: Normal range of motion and neck supple.   Lymphadenopathy:      Cervical: No cervical adenopathy.   Skin:     General: Skin is warm and dry.   Neurological:      General: No focal deficit present.      Mental Status: She is alert.   Psychiatric:         Mood and Affect: Mood normal.         Behavior: Behavior normal.      Comments: Anxious              Results for orders placed or performed in visit on 07/15/25   XR Abdomen 2 Views     Status: None    Narrative    EXAM: XR ABDOMEN 2 VIEWS  LOCATION: [facility]  DATE: 7/15/2025    INDICATION: " Diarrhea.  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 Nonobstructive bowel gas pattern with no free air, gross organomegaly nor urinary tract calculus. Minimal stool burden.   Results for orders placed or performed in visit on 07/15/25   TSH     Status: Normal   Result Value Ref Range    TSH 3.19 0.30 - 4.20 uIU/mL   Comprehensive metabolic panel (BMP + Alb, Alk Phos, ALT, AST, Total. Bili, TP)     Status: Abnormal   Result Value Ref Range    Sodium 136 135 - 145 mmol/L    Potassium 4.2 3.4 - 5.3 mmol/L    Carbon Dioxide (CO2) 26 22 - 29 mmol/L    Anion Gap 12 7 - 15 mmol/L    Urea Nitrogen 13.3 8.0 - 23.0 mg/dL    Creatinine 0.73 0.51 - 0.95 mg/dL    GFR Estimate 81 >60 mL/min/1.73m2    Calcium 10.2 8.8 - 10.4 mg/dL    Chloride 98 98 - 107 mmol/L    Glucose 117 (H) 70 - 99 mg/dL    Alkaline Phosphatase 64 40 - 150 U/L    AST 31 0 - 45 U/L    ALT 20 0 - 50 U/L    Protein Total 8.0 6.4 - 8.3 g/dL    Albumin 4.5 3.5 - 5.2 g/dL    Bilirubin Total 0.2 <=1.2 mg/dL   CBC with platelets and differential     Status: None   Result Value Ref Range    WBC Count 5.9 4.0 - 11.0 10e3/uL    RBC Count 4.80 3.80 - 5.20 10e6/uL    Hemoglobin 14.5 11.7 - 15.7 g/dL    Hematocrit 44.0 35.0 - 47.0 %    MCV 92 78 - 100 fL    MCH 30.2 26.5 - 33.0 pg    MCHC 33.0 31.5 - 36.5 g/dL    RDW 12.7 10.0 - 15.0 %    Platelet Count 309 150 - 450 10e3/uL    % Neutrophils 50 %    % Lymphocytes 36 %    % Monocytes 11 %    % Eosinophils 2 %    % Basophils 1 %    % Immature Granulocytes 0 %    Absolute Neutrophils 3.0 1.6 - 8.3 10e3/uL    Absolute Lymphocytes 2.1 0.8 - 5.3 10e3/uL    Absolute Monocytes 0.7 0.0 - 1.3 10e3/uL    Absolute Eosinophils 0.1 0.0 - 0.7 10e3/uL    Absolute Basophils 0.0 0.0 - 0.2 10e3/uL    Absolute Immature Granulocytes 0.0 <=0.4 10e3/uL   Hemoglobin A1c     Status: Abnormal   Result Value Ref Range    Estimated Average Glucose 134 (H) <117 mg/dL     Hemoglobin A1C 6.3 (H) 0.0 - 5.6 %   CBC with Platelets & Differential     Status: None    Narrative    The following orders were created for panel order CBC with Platelets & Differential.  Procedure                               Abnormality         Status                     ---------                               -----------         ------                     CBC with platelets and ...[[id number]]                      Final result                 Please view results for these tests on the individual orders.           Signed Electronically by: DANICA LARA MD

## 2025-07-17 LAB
EST. AVERAGE GLUCOSE BLD GHB EST-MCNC: 134 MG/DL
HBA1C MFR BLD: 6.3 % (ref 0–5.6)

## 2025-08-17 ASSESSMENT — ENCOUNTER SYMPTOMS
HEMATURIA: 0
SEIZURES: 0
ARTHRALGIAS: 0
ABDOMINAL PAIN: 0
BACK PAIN: 0
CHILLS: 0
FEVER: 0
DIARRHEA: 1
VOMITING: 0
COUGH: 0
DYSURIA: 0
SHORTNESS OF BREATH: 0
EYE PAIN: 0
PALPITATIONS: 0
SORE THROAT: 0
COLOR CHANGE: 0